# Patient Record
Sex: FEMALE | Race: WHITE | NOT HISPANIC OR LATINO | Employment: OTHER | URBAN - METROPOLITAN AREA
[De-identification: names, ages, dates, MRNs, and addresses within clinical notes are randomized per-mention and may not be internally consistent; named-entity substitution may affect disease eponyms.]

---

## 2017-02-06 ENCOUNTER — ALLSCRIPTS OFFICE VISIT (OUTPATIENT)
Dept: OTHER | Facility: OTHER | Age: 42
End: 2017-02-06

## 2017-03-10 ENCOUNTER — ALLSCRIPTS OFFICE VISIT (OUTPATIENT)
Dept: OTHER | Facility: OTHER | Age: 42
End: 2017-03-10

## 2017-10-23 ENCOUNTER — GENERIC CONVERSION - ENCOUNTER (OUTPATIENT)
Dept: OTHER | Facility: OTHER | Age: 42
End: 2017-10-23
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
## 2017-10-24 ENCOUNTER — LAB CONVERSION - ENCOUNTER (OUTPATIENT)
Dept: OTHER | Facility: OTHER | Age: 42
End: 2017-10-24

## 2017-10-24 LAB — LYME IGG/IGM AB (HISTORICAL): <0.9 INDEX

## 2017-10-26 ENCOUNTER — GENERIC CONVERSION - ENCOUNTER (OUTPATIENT)
Dept: OTHER | Facility: OTHER | Age: 42
End: 2017-10-26

## 2018-01-12 NOTE — PROGRESS NOTES
Assessment    1  Sinusitis (473 9) (J32 9)    Plan  Sinusitis    · Cefuroxime Axetil 500 MG Oral Tablet; 1 bid with food    Discussion/Summary    Self Referrals: No      Chief Complaint    1  Cough   2  Ear Pain   3  Fatigue   4  Nasal Symptoms   5  Sore Throat  Pt  in office today c/o cough, ear pain, nasal congestion, fatigue, and sore throat  History of Present Illness  HPI: Patient comes in with sinus congestion, ear pressure, sore throat and cough  Review of Systems    Constitutional: fever, feeling poorly and feeling tired  Cardiovascular: no complaints of slow or fast heart rate, no chest pain, no palpitations, no leg claudication or lower extremity edema  Respiratory: cough  Gastrointestinal: no complaints of abdominal pain, no constipation, no nausea or diarrhea, no vomiting, no bloody stools  Active Problems    1  Abdominal pain, generalized (789 07) (R10 84)   2  Abnormal mammogram of both breasts (793 80) (R92 8)   3  Cyst of right ovary (620 2) (N83 201)   4  Depression, major, recurrent, mild (296 31) (F33 0)   5  Encounter for cervical Pap smear with pelvic exam (V76 2,V72 31) (Z01 419)   6  Encounter for screening mammogram for malignant neoplasm of breast (V76 12)   (Z12 31)   7  Female pelvic pain (625 9) (R10 2)   8  Left acute suppurative otitis media (382 00) (H66 002)   9  Post-operative state (V45 89) (Z98 890)   10  Right lower quadrant abdominal pain (789 03) (R10 31)   11  Screening for hyperlipidemia (V77 91) (Z13 220)   12  Screening for iron deficiency anemia (V78 0) (Z13 0)   13  Strain of right gastrocnemius muscle (844 8) (T49 756R)    Past Medical History  Active Problems And Past Medical History Reviewed: The active problems and past medical history were reviewed and updated today  Family History  Father    1  Family history of alcohol abuse (V61 41) (Z81 1)   2  Family history of substance abuse (V17 0) (Z81 4)  Family History    3   Denied: Family history of mental disorder    Social History    · Being A Social Drinker   · Caffeine Use   · Never A Smoker   · Two children    Surgical History    1  History of Corneal LASIK   2  History of Hysterectomy   3  History of Knee Surgery   4  History of Tonsillectomy    Current Meds   1  Amitriptyline HCl - 25 MG Oral Tablet; TAKE 1 TABLET AT BEDTIME; Therapy: 08Apr2016 to (Evaluate:[id number])  Requested for: 08Apr2016; Last   Rx:08Apr2016 Ordered    The medication list was reviewed and updated today  Allergies    1  Codeine Derivatives   2  Penicillins   3  Tetracyclines    Vitals   Recorded: [id number] 11:09AM   Temperature 98 1 F   Heart Rate 93   Systolic 028   Diastolic 70   Height 5 ft 5 in   Weight 134 lb    BMI Calculated 22 3   BSA Calculated 1 67   O2 Saturation 97     Physical Exam    Constitutional   General appearance: No acute distress, well appearing and well nourished  Eyes   Conjunctiva and lids: No swelling, erythema or discharge  Pupils and irises: Equal, round and reactive to light  Ears, Nose, Mouth, and Throat   External inspection of ears and nose: Normal     Otoscopic examination: Tympanic membranes translucent with normal light reflex  Canals patent without erythema  Nasal mucosa, septum, and turbinates: Abnormal   The bilateral nasal mucosa was red  Oropharynx: Abnormal   The posterior pharynx was erythematous  Pulmonary   Respiratory effort: No increased work of breathing or signs of respiratory distress  Auscultation of lungs: Clear to auscultation  Cardiovascular   Auscultation of heart: Normal rate and rhythm, normal S1 and S2, without murmurs  Abdomen   Abdomen: Non-tender, no masses  Lymphatic   Palpation of lymph nodes in neck: No lymphadenopathy  Musculoskeletal   Gait and station: Normal     Digits and nails: Normal without clubbing or cyanosis      Inspection/palpation of joints, bones, and muscles: Normal     Psychiatric   Orientation to person, place, and time: Normal     Mood and affect: Normal          Signatures   Electronically signed by : MASON Yu ; Mar 10 2017 12:22PM EST                       (Author)

## 2018-01-13 NOTE — MISCELLANEOUS
Message   Recorded as Task   Date: 10/25/2017 11:49 AM, Created By: Gabo Charlton   Task Name: Follow Up   Assigned To: Mara Kenny   Regarding Patient: Zenon Coats, Status: Active   Comment:    Gabo Charlton - 25 Oct 2017 11:49 AM     TASK CREATED  Caller: Self; General Medical Question; [phone] (Home); [phone] (Work)  PT STILL RUNNING A FEVER AND NEGATIVE LYME TEST IS THERE ANYTHING ELSE WE CAN DO FOR HER ?     10/26/2017 at 0811: t/c to patient, reports that she continues to have joint pain, a low grade fever, and fatigue, recent tick bite 1 month ago, lyme's negative  Will treat based on clinical presentation  Has allergy to PCNs and tetracyclines  Will have patient begin cefuroxime 500 mg BID  Advised to call office if no improvement of symptoms in the next 1-2 weeks  Verbalized understanding and in agreement with plan of care  Plan  Tick bite, initial encounter    · Cefuroxime Axetil 500 MG Oral Tablet; TAKE 1 TABLET EVERY 12 HOURS DAILY    TAKE WITH FOOD    Signatures   Electronically signed by : SUSI Suero; Oct 26 2017  8:19AM EST                       (Author)

## 2018-01-14 VITALS
HEIGHT: 65 IN | WEIGHT: 139 LBS | SYSTOLIC BLOOD PRESSURE: 118 MMHG | OXYGEN SATURATION: 96 % | BODY MASS INDEX: 23.16 KG/M2 | HEART RATE: 90 BPM | DIASTOLIC BLOOD PRESSURE: 80 MMHG

## 2018-01-14 VITALS
HEIGHT: 65 IN | SYSTOLIC BLOOD PRESSURE: 128 MMHG | DIASTOLIC BLOOD PRESSURE: 70 MMHG | WEIGHT: 134 LBS | OXYGEN SATURATION: 97 % | HEART RATE: 93 BPM | TEMPERATURE: 98.1 F | BODY MASS INDEX: 22.33 KG/M2

## 2018-01-22 VITALS
HEART RATE: 88 BPM | SYSTOLIC BLOOD PRESSURE: 108 MMHG | HEIGHT: 65 IN | BODY MASS INDEX: 23.99 KG/M2 | OXYGEN SATURATION: 98 % | DIASTOLIC BLOOD PRESSURE: 80 MMHG | WEIGHT: 144 LBS

## 2018-01-22 VITALS — TEMPERATURE: 99.6 F

## 2018-06-11 ENCOUNTER — TELEPHONE (OUTPATIENT)
Dept: FAMILY MEDICINE CLINIC | Facility: CLINIC | Age: 43
End: 2018-06-11

## 2018-06-11 ENCOUNTER — OFFICE VISIT (OUTPATIENT)
Dept: FAMILY MEDICINE CLINIC | Facility: CLINIC | Age: 43
End: 2018-06-11
Payer: OTHER GOVERNMENT

## 2018-06-11 VITALS
SYSTOLIC BLOOD PRESSURE: 104 MMHG | HEIGHT: 65 IN | TEMPERATURE: 99.1 F | WEIGHT: 147 LBS | DIASTOLIC BLOOD PRESSURE: 64 MMHG | OXYGEN SATURATION: 92 % | BODY MASS INDEX: 24.49 KG/M2 | HEART RATE: 90 BPM

## 2018-06-11 DIAGNOSIS — F32.A DEPRESSION, UNSPECIFIED DEPRESSION TYPE: Primary | ICD-10-CM

## 2018-06-11 DIAGNOSIS — M25.521 PAIN IN JOINT INVOLVING UPPER ARM, RIGHT: Primary | ICD-10-CM

## 2018-06-11 PROCEDURE — 99212 OFFICE O/P EST SF 10 MIN: CPT | Performed by: PHYSICIAN ASSISTANT

## 2018-06-11 RX ORDER — AMITRIPTYLINE HYDROCHLORIDE 25 MG/1
TABLET, FILM COATED ORAL
Qty: 30 TABLET | Refills: 3 | Status: SHIPPED | OUTPATIENT
Start: 2018-06-11 | End: 2019-04-18 | Stop reason: SDUPTHER

## 2018-06-11 RX ORDER — PREDNISONE 10 MG/1
TABLET ORAL
Qty: 30 TABLET | Refills: 0 | Status: SHIPPED | OUTPATIENT
Start: 2018-06-11 | End: 2018-09-07

## 2018-06-11 NOTE — PROGRESS NOTES
Assessment/Plan:       Diagnoses and all orders for this visit:    Pain in joint involving upper arm, right  -     predniSONE 10 mg tablet; 5 po x 2d, 4 po x 2d, 3 po x 2d, 2 po x 2d, 1 po x 2d            Subjective:      Patient ID: Debby  is a 38 y o  female  Arm Pain    The incident occurred more than 1 week ago  The incident occurred at the gym  The injury mechanism was repetitive motion  The pain is present in the right elbow  The quality of the pain is described as aching and shooting  The pain radiates to the right arm  The pain is at a severity of 5/10  The pain is moderate  The pain has been intermittent since the incident  Associated symptoms include muscle weakness  Pertinent negatives include no chest pain, numbness or tingling  The symptoms are aggravated by movement and lifting  She has tried NSAIDs and rest for the symptoms  The treatment provided no relief  The following portions of the patient's history were reviewed and updated as appropriate:   She has a past medical history of Depression; Food allergy; Ovarian cyst; Pelvic pain in female; PONV (postoperative nausea and vomiting); and Wears glasses  ,   does not have any pertinent problems on file  ,   has a past surgical history that includes Tonsillectomy; Knee arthroscopy (Left); Refractive surgery (Bilateral); Endometrial ablation; Hysterectomy; Colonoscopy; [place] tooth extraction; pr lap,rmv  adnexal structure (Right, 9/16/2016); and pr lap,diagnostic abdomen (N/A, 9/16/2016)  ,  family history is not on file  ,   reports that she has never smoked  She has never used smokeless tobacco  She reports that she drinks alcohol  She reports that she does not use drugs  ,  is allergic to penicillins; codeine; shellfish-derived products; and tetracyclines & related     Current Outpatient Prescriptions   Medication Sig Dispense Refill    Acetaminophen (TYLENOL EXTRA STRENGTH PO) Take 1-2 tablets by mouth every 4 (four) hours as needed   amitriptyline (ELAVIL) 25 mg tablet Take 25 mg by mouth daily at bedtime as needed  1/2 tab       ibuprofen (MOTRIN) 600 mg tablet Take 1 tablet by mouth every 6 (six) hours as needed for mild pain for up to 30 days  30 tablet 0    predniSONE 10 mg tablet 5 po x 2d, 4 po x 2d, 3 po x 2d, 2 po x 2d, 1 po x 2d 30 tablet 0     No current facility-administered medications for this visit  Review of Systems   Constitutional: Negative for fatigue and fever  Respiratory: Negative for cough, chest tightness and shortness of breath  Cardiovascular: Negative for chest pain  Gastrointestinal: Negative for abdominal pain  Genitourinary: Negative for difficulty urinating  Musculoskeletal: Positive for arthralgias and myalgias  Negative for back pain and joint swelling  Skin: Negative  Allergic/Immunologic: Positive for food allergies  Neurological: Negative for tingling and numbness  Psychiatric/Behavioral: Negative  Objective:  Vitals:    06/11/18 0849   BP: 104/64   Pulse: 90   Temp: 99 1 °F (37 3 °C)   SpO2: 92%   Weight: 66 7 kg (147 lb)   Height: 5' 5" (1 651 m)     Body mass index is 24 46 kg/m²  Physical Exam   Constitutional: She is oriented to person, place, and time  She appears well-developed and well-nourished  Cardiovascular: Intact distal pulses  Musculoskeletal:        Right shoulder: She exhibits decreased range of motion  Right elbow: She exhibits decreased range of motion  She exhibits no swelling, no effusion, no deformity and no laceration  Tenderness found  Medial epicondyle tenderness noted  Neurological: She is alert and oriented to person, place, and time  Psychiatric: She has a normal mood and affect   Her behavior is normal

## 2018-06-11 NOTE — TELEPHONE ENCOUNTER
amitriptyline  25 mg   [facility]   [phone]  (patient was here today   verify this wasn't done already)

## 2018-06-22 ENCOUNTER — OFFICE VISIT (OUTPATIENT)
Dept: URGENT CARE | Facility: CLINIC | Age: 43
End: 2018-06-22
Payer: OTHER GOVERNMENT

## 2018-06-22 VITALS
DIASTOLIC BLOOD PRESSURE: 78 MMHG | OXYGEN SATURATION: 97 % | HEIGHT: 65 IN | BODY MASS INDEX: 24.16 KG/M2 | RESPIRATION RATE: 18 BRPM | SYSTOLIC BLOOD PRESSURE: 118 MMHG | TEMPERATURE: 98.4 F | HEART RATE: 90 BPM | WEIGHT: 145 LBS

## 2018-06-22 DIAGNOSIS — J01.90 ACUTE SINUSITIS, RECURRENCE NOT SPECIFIED, UNSPECIFIED LOCATION: Primary | ICD-10-CM

## 2018-06-22 PROCEDURE — 99203 OFFICE O/P NEW LOW 30 MIN: CPT | Performed by: PHYSICIAN ASSISTANT

## 2018-06-22 RX ORDER — AZITHROMYCIN 250 MG/1
TABLET, FILM COATED ORAL
Qty: 6 TABLET | Refills: 0 | Status: SHIPPED | OUTPATIENT
Start: 2018-06-22 | End: 2018-06-26

## 2018-06-22 RX ORDER — FLUTICASONE PROPIONATE 50 MCG
1 SPRAY, SUSPENSION (ML) NASAL DAILY
Qty: 16 G | Refills: 0 | Status: SHIPPED | OUTPATIENT
Start: 2018-06-22 | End: 2019-01-23 | Stop reason: ALTCHOICE

## 2018-06-22 RX ORDER — BROMPHENIRAMINE MALEATE, PSEUDOEPHEDRINE HYDROCHLORIDE, AND DEXTROMETHORPHAN HYDROBROMIDE 2; 30; 10 MG/5ML; MG/5ML; MG/5ML
5 SYRUP ORAL 4 TIMES DAILY PRN
Qty: 120 ML | Refills: 0 | Status: SHIPPED | OUTPATIENT
Start: 2018-06-22 | End: 2018-06-29

## 2018-06-22 NOTE — PATIENT INSTRUCTIONS
Symptoms are consistent with possible early sinusitis  -take Bromfed as directed  -use Flonase nasal spray as directed  -if symptoms do not improve over the next 3 days, please start taking the antibiotic as directed with food  -Increase fluids  -Tylenol/motrin  -Salt H20 gargles/throat lozenges  -Try using humidifier at nighttime    -Follow-up with PCP within 5 days  -Go to ER with worsening symptoms, difficulty breathing, high fever, or any signs of distress    Sinusitis   AMBULATORY CARE:   Sinusitis  is inflammation or infection of your sinuses  It is most often caused by a virus  Acute sinusitis may last up to 12 weeks  Chronic sinusitis lasts longer than 12 weeks  Recurrent sinusitis means you have 4 or more times in 1 year  Common symptoms include the following:   · Fever    · Pain, pressure, redness, or swelling around the forehead, cheeks, or eyes    · Thick yellow or green discharge from your nose    · Tenderness when you touch your face over your sinuses    · Dry cough that happens mostly at night or when you lie down    · Headache and face pain that is worse when you lean forward    · Tooth pain, or pain when you chew  Seek care immediately if:   · Your eye and eyelid are red, swollen, and painful  · You cannot open your eye  · You have vision changes, such as double vision  · Your eyeball bulges out or you cannot move your eye  · You are more sleepy than normal, or you notice changes in your ability to think, move, or talk  · You have a stiff neck, a fever, or a bad headache  · You have swelling of your forehead or scalp  Contact your healthcare provider if:   · Your symptoms do not improve after 3 days  · Your symptoms do not go away after 10 days  · You have nausea and are vomiting  · Your nose is bleeding  · You have questions or concerns about your condition or care  Treatment for sinusitis:  Your symptoms may go away on their own   Your healthcare provider may recommend watchful waiting for up to 10 days before starting antibiotics  You may  need any of the following:  · Acetaminophen  decreases pain and fever  It is available without a doctor's order  Ask how much to take and how often to take it  Follow directions  Read the labels of all other medicines you are using to see if they also contain acetaminophen, or ask your doctor or pharmacist  Acetaminophen can cause liver damage if not taken correctly  Do not use more than 4 grams (4,000 milligrams) total of acetaminophen in one day  · NSAIDs , such as ibuprofen, help decrease swelling, pain, and fever  This medicine is available with or without a doctor's order  NSAIDs can cause stomach bleeding or kidney problems in certain people  If you take blood thinner medicine, always ask your healthcare provider if NSAIDs are safe for you  Always read the medicine label and follow directions  · Nasal steroid sprays  may help decrease inflammation in your nose and sinuses  · Decongestants  help reduce swelling and drain mucus in the nose and sinuses  They may help you breathe easier  · Antihistamines  help dry mucus in the nose and relieve sneezing  · Antibiotics  help treat or prevent a bacterial infection  · Take your medicine as directed  Contact your healthcare provider if you think your medicine is not helping or if you have side effects  Tell him or her if you are allergic to any medicine  Keep a list of the medicines, vitamins, and herbs you take  Include the amounts, and when and why you take them  Bring the list or the pill bottles to follow-up visits  Carry your medicine list with you in case of an emergency  Self-care:   · Rinse your sinuses  Use a sinus rinse device to rinse your nasal passages with a saline (salt water) solution or distilled water  Do not use tap water  This will help thin the mucus in your nose and rinse away pollen and dirt   It will also help reduce swelling so you can breathe normally  Ask your healthcare provider how often to do this  · Breathe in steam   Heat a bowl of water until you see steam  Lean over the bowl and make a tent over your head with a large towel  Breathe deeply for about 20 minutes  Be careful not to get too close to the steam or burn yourself  Do this 3 times a day  You can also breathe deeply when you take a hot shower  · Sleep with your head elevated  Place an extra pillow under your head before you go to sleep to help your sinuses drain  · Drink liquids as directed  Ask your healthcare provider how much liquid to drink each day and which liquids are best for you  Liquids will thin the mucus in your nose and help it drain  Avoid drinks that contain alcohol or caffeine  · Do not smoke, and avoid secondhand smoke  Nicotine and other chemicals in cigarettes and cigars can make your symptoms worse  Ask your healthcare provider for information if you currently smoke and need help to quit  E-cigarettes or smokeless tobacco still contain nicotine  Talk to your healthcare provider before you use these products  Prevent the spread of germs that cause sinusitis:  Wash your hands often with soap and water  Wash your hands after you use the bathroom, change a child's diaper, or sneeze  Wash your hands before you prepare or eat food  Follow up with your healthcare provider as directed: You may be referred to an ear, nose, and throat specialist  Write down your questions so you remember to ask them during your visits  © 2017 [employer] Information is for End User's use only and may not be sold, redistributed or otherwise used for commercial purposes  All illustrations and images included in CareNotes® are the copyrighted property of [employer]  or Devin Sharma  The above information is an  only  It is not intended as medical advice for individual conditions or treatments   Talk to your doctor, nurse or pharmacist before following any medical regimen to see if it is safe and effective for you

## 2018-06-22 NOTE — PROGRESS NOTES
[facility]        NAME: Tamiko Hernandez is a 38 y o  female  : 1975    MRN: [id number]  DATE: 2018  TIME: 10:51 AM    Assessment and Plan   Acute sinusitis, recurrence not specified, unspecified location [J01 90]  1  Acute sinusitis, recurrence not specified, unspecified location  brompheniramine-pseudoephedrine-DM 30-2-10 MG/5ML syrup    fluticasone (FLONASE) 50 mcg/act nasal spray    azithromycin (ZITHROMAX) 250 mg tablet         Patient Instructions     Symptoms are consistent with possible early sinusitis  -take Bromfed as directed  -use Flonase nasal spray as directed  -if symptoms do not improve over the next 3 days, please start taking the antibiotic as directed with food  -Increase fluids  -Tylenol/motrin  -Salt H20 gargles/throat lozenges  -Try using humidifier at nighttime    -Follow-up with PCP within 5 days  -Go to ER with worsening symptoms, difficulty breathing, high fever, or any signs of distress    Chief Complaint     Chief Complaint   Patient presents with    Facial Pain     x4 days - fever  producing yellow mucous  No relief w/ OTC meds  Taking sudafed    Sore Throat    Cough         History of Present Illness       Patient is a 58-year-old female who presents today for evaluation of congestion/sinus pain and pressure that started on Monday  Patient states that she is having yellow drainage  She also admits to a slight sore throat with postnasal drip and cough  Patient has tried taking over-the-counter Sudafed without any relief  Patient admits to being around multiple sick contacts in her office  Review of Systems   Review of Systems   Constitutional: Negative for chills and fever  HENT: Positive for congestion, postnasal drip, rhinorrhea, sinus pain, sinus pressure and sore throat  Eyes: Negative for visual disturbance  Respiratory: Negative for shortness of breath  Cardiovascular: Negative for chest pain     Neurological: Negative for headaches  Current Medications       Current Outpatient Prescriptions:     amitriptyline (ELAVIL) 25 mg tablet, 1 tablet q hs, Disp: 30 tablet, Rfl: 3    Acetaminophen (TYLENOL EXTRA STRENGTH PO), Take 1-2 tablets by mouth every 4 (four) hours as needed  , Disp: , Rfl:     azithromycin (ZITHROMAX) 250 mg tablet, Take 2 tablets today then 1 tablet daily x 4 days, Disp: 6 tablet, Rfl: 0    brompheniramine-pseudoephedrine-DM 30-2-10 MG/5ML syrup, Take 5 mL by mouth 4 (four) times a day as needed for congestion or cough for up to 7 days, Disp: 120 mL, Rfl: 0    fluticasone (FLONASE) 50 mcg/act nasal spray, 1 spray into each nostril daily, Disp: 16 g, Rfl: 0    predniSONE 10 mg tablet, 5 po x 2d, 4 po x 2d, 3 po x 2d, 2 po x 2d, 1 po x 2d, Disp: 30 tablet, Rfl: 0    Current Allergies     Allergies as of 06/22/2018 - Reviewed 06/22/2018   Allergen Reaction Noted    Penicillins Hives 04/28/2014    Codeine GI Intolerance 07/24/2016    Shellfish-derived products Other (See Comments) 09/16/2016    Tetracyclines & related GI Intolerance 07/24/2016            The following portions of the patient's history were reviewed and updated as appropriate: allergies, current medications, past family history, past medical history, past social history, past surgical history and problem list      Past Medical History:   Diagnosis Date    Depression     Food allergy     scallops    Ovarian cyst     right    Pelvic pain in female     PONV (postoperative nausea and vomiting)     Wears glasses        Past Surgical History:   Procedure Laterality Date    COLONOSCOPY      ENDOMETRIAL ABLATION      HYSTERECTOMY      Partial    KNEE ARTHROSCOPY Left     ID LAP,DIAGNOSTIC ABDOMEN N/A 9/16/2016    Procedure: LAPAROSCOPY DIAGNOSTIC;  Surgeon: Marylin Michele DO;  Location: [facility];  Service: Gynecology    ID LAP,RMV  ADNEXAL STRUCTURE Right 9/16/2016    Procedure:  OOPHRECTOMY;  Surgeon: Marylin Michele DO; Location: [facility];  Service: Gynecology    REFRACTIVE SURGERY Bilateral     TONSILLECTOMY      WISDOM TOOTH EXTRACTION         No family history on file  Medications have been verified  Objective   /78   Pulse 90   Temp 98 4 °F (36 9 °C) (Temporal)   Resp 18   Ht 5' 5" (1 651 m)   Wt 65 8 kg (145 lb)   LMP  (LMP Unknown)   SpO2 97%   BMI 24 13 kg/m²        Physical Exam     Physical Exam   Constitutional: She is oriented to person, place, and time  She appears well-developed and well-nourished  No distress  HENT:   Right Ear: Tympanic membrane and external ear normal    Left Ear: Tympanic membrane and external ear normal    Nose: Mucosal edema and rhinorrhea present  Right sinus exhibits maxillary sinus tenderness and frontal sinus tenderness  Left sinus exhibits maxillary sinus tenderness and frontal sinus tenderness  Mouth/Throat: Uvula is midline, oropharynx is clear and moist and mucous membranes are normal        Eyes: Conjunctivae and EOM are normal  Pupils are equal, round, and reactive to light  Neck: Normal range of motion  Neck supple  Cardiovascular: Normal rate, regular rhythm and normal heart sounds  Pulmonary/Chest: Effort normal and breath sounds normal  She has no wheezes  She has no rales  Lymphadenopathy:     She has no cervical adenopathy  Neurological: She is alert and oriented to person, place, and time  Skin: Skin is warm and dry  Psychiatric: She has a normal mood and affect  Nursing note and vitals reviewed

## 2018-07-02 ENCOUNTER — OFFICE VISIT (OUTPATIENT)
Dept: FAMILY MEDICINE CLINIC | Facility: CLINIC | Age: 43
End: 2018-07-02
Payer: OTHER GOVERNMENT

## 2018-07-02 VITALS
WEIGHT: 146.6 LBS | DIASTOLIC BLOOD PRESSURE: 68 MMHG | HEART RATE: 89 BPM | TEMPERATURE: 98.3 F | OXYGEN SATURATION: 97 % | BODY MASS INDEX: 24.43 KG/M2 | SYSTOLIC BLOOD PRESSURE: 100 MMHG | HEIGHT: 65 IN

## 2018-07-02 DIAGNOSIS — M25.521 ELBOW PAIN, RIGHT: Primary | ICD-10-CM

## 2018-07-02 PROCEDURE — 99212 OFFICE O/P EST SF 10 MIN: CPT | Performed by: PHYSICIAN ASSISTANT

## 2018-07-02 NOTE — PROGRESS NOTES
Assessment/Plan:       Diagnoses and all orders for this visit:    Elbow pain, right  -     Ambulatory referral to Orthopedic Surgery; Future            Subjective:      Patient ID: Domenico Barnes is a 38 y o  female  Patient is here today for follow-up of her right elbow pain  She states that she did get some relief of pain from the prednisone taper and rest   She still notes that she has pain with supination  She has not gone back to lifting weights yet  She was seen in the urgent care for sinus infection  She states that she started having improvement over the weekend  The following portions of the patient's history were reviewed and updated as appropriate:   She has a past medical history of Depression; Food allergy; Ovarian cyst; Pelvic pain in female; PONV (postoperative nausea and vomiting); and Wears glasses  ,   does not have any pertinent problems on file  ,   has a past surgical history that includes Tonsillectomy; Knee arthroscopy (Left); Refractive surgery (Bilateral); Endometrial ablation; Hysterectomy; Colonoscopy; [place] tooth extraction; pr lap,rmv  adnexal structure (Right, 9/16/2016); and pr lap,diagnostic abdomen (N/A, 9/16/2016)  ,  family history includes Alcohol abuse in her father; Substance Abuse in her father  ,   reports that she has never smoked  She has never used smokeless tobacco  She reports that she drinks alcohol  She reports that she does not use drugs  ,  is allergic to penicillins; codeine; shellfish-derived products; and tetracyclines & related     Current Outpatient Prescriptions   Medication Sig Dispense Refill    Acetaminophen (TYLENOL EXTRA STRENGTH PO) Take 1-2 tablets by mouth every 4 (four) hours as needed        amitriptyline (ELAVIL) 25 mg tablet 1 tablet q hs 30 tablet 3    fluticasone (FLONASE) 50 mcg/act nasal spray 1 spray into each nostril daily 16 g 0    predniSONE 10 mg tablet 5 po x 2d, 4 po x 2d, 3 po x 2d, 2 po x 2d, 1 po x 2d 30 tablet 0     No current facility-administered medications for this visit  Review of Systems   Constitutional: Negative for chills and fever  HENT: Positive for congestion  Negative for sinus pain and sinus pressure  Musculoskeletal: Positive for joint swelling  Negative for back pain  Neurological: Negative  Objective:  Vitals:    07/02/18 1118   BP: 100/68   Pulse: 89   Temp: 98 3 °F (36 8 °C)   SpO2: 97%   Weight: 66 5 kg (146 lb 9 6 oz)   Height: 5' 5" (1 651 m)     Body mass index is 24 4 kg/m²  Physical Exam   Constitutional: She is oriented to person, place, and time  She appears well-developed and well-nourished  Musculoskeletal: Normal range of motion  She exhibits tenderness  Neurological: She is alert and oriented to person, place, and time  Skin: Skin is warm and dry  No erythema  Psychiatric: She has a normal mood and affect   Her behavior is normal

## 2018-09-07 ENCOUNTER — OFFICE VISIT (OUTPATIENT)
Dept: OBGYN CLINIC | Facility: MEDICAL CENTER | Age: 43
End: 2018-09-07
Payer: OTHER GOVERNMENT

## 2018-09-07 ENCOUNTER — APPOINTMENT (OUTPATIENT)
Dept: RADIOLOGY | Facility: MEDICAL CENTER | Age: 43
End: 2018-09-07
Payer: OTHER GOVERNMENT

## 2018-09-07 VITALS
HEIGHT: 65 IN | DIASTOLIC BLOOD PRESSURE: 72 MMHG | SYSTOLIC BLOOD PRESSURE: 106 MMHG | HEART RATE: 85 BPM | BODY MASS INDEX: 22.16 KG/M2 | WEIGHT: 133 LBS

## 2018-09-07 DIAGNOSIS — M25.521 RIGHT ELBOW PAIN: Primary | ICD-10-CM

## 2018-09-07 DIAGNOSIS — M77.8 RIGHT ELBOW TENDONITIS: ICD-10-CM

## 2018-09-07 DIAGNOSIS — M77.11 LATERAL EPICONDYLITIS OF RIGHT ELBOW: ICD-10-CM

## 2018-09-07 DIAGNOSIS — M25.521 ELBOW PAIN, RIGHT: ICD-10-CM

## 2018-09-07 DIAGNOSIS — M25.521 RIGHT ELBOW PAIN: ICD-10-CM

## 2018-09-07 PROCEDURE — 73070 X-RAY EXAM OF ELBOW: CPT

## 2018-09-07 PROCEDURE — 99203 OFFICE O/P NEW LOW 30 MIN: CPT | Performed by: ORTHOPAEDIC SURGERY

## 2018-09-07 NOTE — PROGRESS NOTES
Orthopedics   Pito Jackson, 39 yo  female MRN: [id number]    Chief Complaint:   Right elbow pain    HPI:  38 y o  right-hand-dominant female presents to our office with complaints of right elbow pain  Pain started 3 months ago without any inciting or traumatic events  Pain is on the outside of her elbow and radiates 1/3 down her forearm  Positions of flexion and supination of the elbow make the pain worse as does wrist extension  No numbness or tingling noted  No pain at the wrist or the shoulder  No position makes it better  She did try a short course of p  o  prednisone per family medicine doctor which did help the pain  The pain, unfortunately, came back when she stopped the course  Review Of Systems:   Review of Systems   Constitutional: Positive for activity change  HENT: Negative  Eyes: Negative  Respiratory: Negative  Cardiovascular: Negative  Gastrointestinal: Negative  Endocrine: Negative  Genitourinary: Negative  Musculoskeletal: Positive for arthralgias and myalgias  Skin: Negative  Allergic/Immunologic: Negative  Neurological: Negative  Hematological: Negative  Psychiatric/Behavioral: Negative          Past Medical History:   Past Medical History:   Diagnosis Date    Fractures     Osteoarthritis        Past Surgical History:   Past Surgical History:   Procedure Laterality Date    KNEE SURGERY      WRIST SURGERY         Family History:  Family history reviewed and non-contributory  Family History   Problem Relation Age of Onset    Heart attack Mother     Colon cancer Father        Social History:  Social History     Social History    Marital status: /Civil Union     Spouse name: N/A    Number of children: N/A    Years of education: N/A     Social History Main Topics    Smoking status: Former Smoker    Smokeless tobacco: Never Used    Alcohol use Yes      Comment: Socially    Drug use: No    Sexual activity: Not on file     Other Topics Concern    Not on file     Social History Narrative    No narrative on file       Allergies: Allergies   Allergen Reactions    Silver Sulfadiazine      Swelling above wound site       Labs:    0  Lab Value Date/Time   HCT 36 5 02/07/2015 1154   HGB 12 5 02/07/2015 1154   WBC 28,255 (H) 02/07/2015 1510   WBC 10 98 (H) 02/07/2015 1154   ESR 21 (H) 02/07/2015 1154   CRP 40 8 (H) 02/07/2015 1154       Meds:    Current Outpatient Prescriptions:     ValACYclovir HCl (VALTREX PO), Take by mouth, Disp: , Rfl:     Physical Exam:   /71   Pulse 73   Ht 5' 4" (1 626 m)   Wt 64 9 kg (143 lb)   BMI 24 55 kg/m²   Gen: Alert and oriented to person, place, time  HEENT: EOMI, eyes clear, moist mucus membranes, hearing intact  Respiratory: Bilateral chest rise  No audible wheezing found  Cardiovascular: No audible murmurs  Abdomen: Soft  Musculoskeletal: right upper extremity  · Skin intact  · TTP over lateral epicondyle  · Full active & passive ROM at elbow 0-135 degree   · Pain in the elbow with supination and elbow flexion past 90 degrees  · Pain with wrist extension  · No pain with active or passive movement of right shoulder or right wrist other than in wrist extension  · SILT m/r/u    · Motor intact ain/pin/m/r/u   · 2+ rad pulse    Radiology:   I personally reviewed the films  X-rays of right elbow show no acute fractures, dislocations, or degenerative changes    Assessment:  77 y  o female with right elbow lateral epicondylitis     Plan:   Weightbearing as tolerated right upper extremity  A corticosteroid injection was placed at the bedside to help with pain control  We will order physical therapy for the patient to begin strengthening and range-of-motion exercises  Will follow up with the patient in 8 weeks to evaluate treatment progression in symptoms  Patient understands and agrees with the treatment plan  Barbra Patel MD

## 2018-09-20 ENCOUNTER — EVALUATION (OUTPATIENT)
Dept: PHYSICAL THERAPY | Facility: CLINIC | Age: 43
End: 2018-09-20
Payer: OTHER GOVERNMENT

## 2018-09-20 DIAGNOSIS — M77.11 LATERAL EPICONDYLITIS OF RIGHT ELBOW: ICD-10-CM

## 2018-09-20 DIAGNOSIS — M25.521 RIGHT ELBOW PAIN: Primary | ICD-10-CM

## 2018-09-20 DIAGNOSIS — M77.8 RIGHT ELBOW TENDONITIS: ICD-10-CM

## 2018-09-20 DIAGNOSIS — M77.8 TENDINITIS OF RIGHT ELBOW: ICD-10-CM

## 2018-09-20 DIAGNOSIS — M25.521 ELBOW PAIN, RIGHT: ICD-10-CM

## 2018-09-20 PROCEDURE — 97140 MANUAL THERAPY 1/> REGIONS: CPT | Performed by: PHYSICAL THERAPIST

## 2018-09-20 PROCEDURE — 97162 PT EVAL MOD COMPLEX 30 MIN: CPT | Performed by: PHYSICAL THERAPIST

## 2018-09-20 PROCEDURE — G8985 CARRY GOAL STATUS: HCPCS | Performed by: PHYSICAL THERAPIST

## 2018-09-20 PROCEDURE — 97110 THERAPEUTIC EXERCISES: CPT | Performed by: PHYSICAL THERAPIST

## 2018-09-20 PROCEDURE — G8984 CARRY CURRENT STATUS: HCPCS | Performed by: PHYSICAL THERAPIST

## 2018-09-27 ENCOUNTER — OFFICE VISIT (OUTPATIENT)
Dept: PHYSICAL THERAPY | Facility: CLINIC | Age: 43
End: 2018-09-27
Payer: OTHER GOVERNMENT

## 2018-09-27 DIAGNOSIS — M77.11 LATERAL EPICONDYLITIS OF RIGHT ELBOW: Primary | ICD-10-CM

## 2018-09-27 DIAGNOSIS — M25.521 ELBOW PAIN, RIGHT: ICD-10-CM

## 2018-09-27 DIAGNOSIS — M25.521 PAIN IN JOINT INVOLVING UPPER ARM, RIGHT: ICD-10-CM

## 2018-09-27 PROCEDURE — 97140 MANUAL THERAPY 1/> REGIONS: CPT

## 2018-09-27 PROCEDURE — 97110 THERAPEUTIC EXERCISES: CPT

## 2018-09-27 NOTE — PROGRESS NOTES
Daily Note     Today's date: 2018  Patient name: Tete Velazco  : 1975  MRN: [id number]  Referring provider: Rocco Kimball MD  Dx:   Encounter Diagnosis     ICD-10-CM    1  Lateral epicondylitis of right elbow M77 11    2  Elbow pain, right M25 521    3  Pain in joint involving upper arm, right M25 521        Start Time: 0830  Stop Time: 09  Total time in clinic (min): 60 minutes    Subjective:  Reports 3-4/10  Has been trying to follow HEP      Objective: See treatment diary below      AssDaily Treatment Diary      Manual   (IE)                     Graston to right elbow extensors 8'  5'                   Radial nerve glides NV  KS                    Graston to right  Supinator/po brachior    6'                                                                         Exercise Diary   (IE)                     MH to start 10 mins NV  10'                                           Manual First                       - self wrist flexion str 4 x 30" NV  4 x 30"                   - self wrist extension str 4 x 30" NV  4 x30"                   - AROM wrist flex 2 x 10 ea NV  2 x10ea                   - AROM wrist ext 2 x 10 ea NV  2 x 10                   - AROM forearm pronation/supination 2 x 10 ea NV  2 x 10                                           Scapular retraction 10 x 10" NV  10 x 10"                   Cervical retraction 10 x 10" NV  10x 10"                    digiflex ea finger/     10x                                                                                                                                                                    Reviewed HEP  wrist stretches                                                   Modalities   (IE)                     MH to start 10 mins NV  10 min pre-session                   Cryo to finish 10 mins NV  10 min                                                   essment: Tolerated treatment well   Patient demonstrated fatigue post treatment and would benefit from continued PT      Plan: Progress treatment as tolerated

## 2018-10-01 ENCOUNTER — OFFICE VISIT (OUTPATIENT)
Dept: PHYSICAL THERAPY | Facility: CLINIC | Age: 43
End: 2018-10-01
Payer: OTHER GOVERNMENT

## 2018-10-01 DIAGNOSIS — M77.8 RIGHT ELBOW TENDONITIS: ICD-10-CM

## 2018-10-01 DIAGNOSIS — M77.11 LATERAL EPICONDYLITIS OF RIGHT ELBOW: Primary | ICD-10-CM

## 2018-10-01 DIAGNOSIS — M25.521 RIGHT ELBOW PAIN: ICD-10-CM

## 2018-10-01 DIAGNOSIS — M25.521 PAIN IN JOINT INVOLVING UPPER ARM, RIGHT: ICD-10-CM

## 2018-10-01 DIAGNOSIS — M77.8 TENDINITIS OF RIGHT ELBOW: ICD-10-CM

## 2018-10-01 DIAGNOSIS — M25.521 ELBOW PAIN, RIGHT: ICD-10-CM

## 2018-10-01 PROCEDURE — 97140 MANUAL THERAPY 1/> REGIONS: CPT

## 2018-10-01 PROCEDURE — 97110 THERAPEUTIC EXERCISES: CPT

## 2018-10-01 NOTE — PROGRESS NOTES
Daily Note     Today's date: 10/1/2018  Patient name: Twan Cho  : 1975  MRN: [id number]  Referring provider: Shawna Hernandez MD  Dx:   Encounter Diagnosis     ICD-10-CM    1  Lateral epicondylitis of right elbow M77 11    2  Elbow pain, right M25 521    3  Pain in joint involving upper arm, right M25 521    4  Right elbow pain M25 521    5  Tendinitis of right elbow M77 8    6  Right elbow tendonitis M77 8                   Subjective:  Pt reported that she has been feeling great          Objective: See treatment diary below      AssDaily Treatment Diary      Manual  - (IE)  9/27  10/1                 Graston to right elbow extensors 8'  5'  5'                 Radial nerve glides NV  KS                    Graston to right  Supinator/po brachior    6'  5'                                                                       Exercise Diary  - (IE)  9/27  10/1                  to start 10 mins NV  10'  Np                                         Manual First                       - self wrist flexion str 4 x 30" NV  4 x 30"  4 x30"                 - self wrist extension str 4 x 30" NV  4 x30"  4x 30"                 - AROM wrist flex 2 x 10 ea NV  2 x10ea  2x10 ea                  - AROM wrist ext 2 x 10 ea NV  2 x 10  2x10                  - AROM forearm pronation/supination 2 x 10 ea NV  2 x 10  2x10                                         Scapular retraction 10 x 10" NV  10 x 10"  10x 10"                 Cervical retraction 10 x 10" NV  10x 10"  10x 10"                  digiflex ea finger/     10x  10x red gripper                                                                                                                                                                  Reviewed HEP  wrist stretches                                                   Modalities  - (IE)  9/27  10/1                  to start 10 mins NV  10 min pre-session                   Cryo to finish 10 mins NV  10 min  10 min                                                 essment: Tolerated treatment well  Patient demonstrated fatigue post treatment and would benefit from continued PT  Pt expressed the exercises were easy  NV possibly add weights with wrist flexion and / exercise  Cryo for 10 minutes post session to decrease inflammation along with pain on R elbow  Plan: Progress treatment as tolerated

## 2018-10-04 ENCOUNTER — OFFICE VISIT (OUTPATIENT)
Dept: PHYSICAL THERAPY | Facility: CLINIC | Age: 43
End: 2018-10-04
Payer: OTHER GOVERNMENT

## 2018-10-04 DIAGNOSIS — M25.521 PAIN IN JOINT INVOLVING UPPER ARM, RIGHT: ICD-10-CM

## 2018-10-04 DIAGNOSIS — M25.521 RIGHT ELBOW PAIN: ICD-10-CM

## 2018-10-04 DIAGNOSIS — M77.8 RIGHT ELBOW TENDONITIS: ICD-10-CM

## 2018-10-04 DIAGNOSIS — M77.11 LATERAL EPICONDYLITIS OF RIGHT ELBOW: Primary | ICD-10-CM

## 2018-10-04 DIAGNOSIS — M25.521 ELBOW PAIN, RIGHT: ICD-10-CM

## 2018-10-04 DIAGNOSIS — M77.8 TENDINITIS OF RIGHT ELBOW: ICD-10-CM

## 2018-10-04 PROCEDURE — 97140 MANUAL THERAPY 1/> REGIONS: CPT | Performed by: PHYSICAL THERAPIST

## 2018-10-04 PROCEDURE — 97110 THERAPEUTIC EXERCISES: CPT | Performed by: PHYSICAL THERAPIST

## 2018-10-04 NOTE — PROGRESS NOTES
Daily Note     Today's date: 10/4/2018  Patient name: Ja Russ  : 1975  MRN: [id number]  Referring provider: Rosy Meyre MD  Dx:   Encounter Diagnosis     ICD-10-CM    1  Lateral epicondylitis of right elbow M77 11    2  Elbow pain, right M25 521    3  Pain in joint involving upper arm, right M25 521    4  Right elbow pain M25 521    5  Tendinitis of right elbow M77 8    6  Right elbow tendonitis M77 8                   Subjective: Patient reports she is no longer experiencing pain at rest        Objective: See treatment diary below      Assessment: Progressed exercises this date to include eccentric strengthening to enhance scar tissue remodeling  Pt was able to tolerate all exercises with minimal complaints of pain/discomfort  Pt required moderate verbal and tactile cues to attain full scapular retraction and depression with shoulder theraband strengthening  Plan: Progress treatment as tolerated        Daily Treatment Diary      Manual   (IE)  9/27  10/1  10-4               Graston to right elbow extensors 8'  5'  5'  JG with STM               Radial nerve glides NV  KS    NV                Graston to right  Supinator/po brachior    6'  5'                                                                       Exercise Diary   (IE)  9/27  10/1  10-4               MH to start 10 mins NV  10'  Np  10'                                       Manual First                       - self wrist flexion str 4 x 30" NV  4 x 30"  4 x30"  4 x 30"               - self wrist extension str 4 x 30" NV  4 x30"  4x 30"  4 x 30"               - AROM wrist flex 2 x 10 ea NV  2 x10ea  2x10 ea   DC               - AROM wrist ext 2 x 10 ea NV  2 x 10  2x10   DC               - AROM forearm pronation/supination 2 x 10 ea NV  2 x 10  2x10  DC                - eccentric wrist extension (6 seconds on the way down)        2 x 10 2#               - wrist flexion     2 x 10 2#         - forearm pronation/supination    2 x 10 2#                      Scapular retraction 10 x 10" NV  10 x 10"  10x 10"  DC               Cervical retraction 10 x 10" NV  10x 10"  10x 10"  DC                digiflex ea finger/     10x  10x red gripper  10x red gripper                Digiflex        10 x 10" Red                TB:                        - rows        2 x 10 RTB                - pulldowns        2 x 10 RTB                                                                Reviewed HEP  wrist stretches                                                   Modalities  9-20 (IE)  9/27  10/1  10-4               MH to start 10 mins NV  10 min pre-session    10 min pre-session               Cryo to finish 10 mins NV  10 min  10 min

## 2018-10-11 ENCOUNTER — OFFICE VISIT (OUTPATIENT)
Dept: PHYSICAL THERAPY | Facility: CLINIC | Age: 43
End: 2018-10-11
Payer: OTHER GOVERNMENT

## 2018-10-11 DIAGNOSIS — M25.521 RIGHT ELBOW PAIN: ICD-10-CM

## 2018-10-11 DIAGNOSIS — M77.11 LATERAL EPICONDYLITIS OF RIGHT ELBOW: Primary | ICD-10-CM

## 2018-10-11 DIAGNOSIS — M25.521 PAIN IN JOINT INVOLVING UPPER ARM, RIGHT: ICD-10-CM

## 2018-10-11 DIAGNOSIS — M77.8 RIGHT ELBOW TENDONITIS: ICD-10-CM

## 2018-10-11 DIAGNOSIS — M77.8 TENDINITIS OF RIGHT ELBOW: ICD-10-CM

## 2018-10-11 DIAGNOSIS — M25.521 ELBOW PAIN, RIGHT: ICD-10-CM

## 2018-10-11 PROCEDURE — 97110 THERAPEUTIC EXERCISES: CPT | Performed by: PHYSICAL THERAPIST

## 2018-10-11 NOTE — PROGRESS NOTES
Daily Note     Today's date: 10/11/2018  Patient name: Petty Saeed  : 1975  MRN: [id number]  Referring provider: Rajeev Drake MD  Dx:   Encounter Diagnosis     ICD-10-CM    1  Lateral epicondylitis of right elbow M77 11    2  Elbow pain, right M25 521    3  Pain in joint involving upper arm, right M25 521    4  Right elbow pain M25 521    5  Tendinitis of right elbow M77 8    6  Right elbow tendonitis M77 8                   Subjective: Patient reports minimal increase in muscle soreness following previous session  Objective: See treatment diary below      Assessment: Progressed exercises this session as charted  Pt was able to tolerate progressions with mild muscle soreness  Decreased muscle tension noted following manual therapy techniques and less feedback noted through IASTM tools  Plan: Progress treatment as tolerated          Daily Treatment Diary      Manual   (IE)  9/27  10/1  10-4  10-11             Graston to right elbow extensors 8'  5'  5'  JG with STM   JG with STM             Radial nerve glides NV  KS    NV                Graston to right  Supinator/po brachior    6'  5'                                                                       Exercise Diary   (IE)  9/27  10/1  10-4  10-11             MH to start 10 mins NV  10'  Np  10'  10'                                     Manual First                       - self wrist flexion str 4 x 30" NV  4 x 30"  4 x30"  4 x 30"   4 x 30"             - self wrist extension str 4 x 30" NV  4 x30"  4x 30"  4 x 30"   4 x 30"             - AROM wrist flex 2 x 10 ea NV  2 x10ea  2x10 ea   DC               - AROM wrist ext 2 x 10 ea NV  2 x 10  2x10   DC               - AROM forearm pronation/supination 2 x 10 ea NV  2 x 10  2x10  DC                - eccentric wrist extension (6 seconds on the way down)        2 x 10 2#  2 x 10 2#             - wrist flexion        2 x 10 2#  2 x 10 2#             - forearm pronation/supination       2 x 10 2#  2 x 10 2#                                     Scapular retraction 10 x 10" NV  10 x 10"  10x 10"  DC               Cervical retraction 10 x 10" NV  10x 10"  10x 10"  DC                digiflex ea finger/     10x  10x red gripper  10x red gripper  10x red gripper              Digiflex        10 x 10" Red  10 x 10" Red              TB:                        - rows        2 x 10 RTB  2 x 10 OTB              - pulldowns        2 x 10 RTB   2 x 10 OTB                                      Therabar eccentric wrist extension          5 x Red therabar              Reviewed HEP  wrist stretches                                                   Modalities  9-20 (IE)  9/27  10/1  10-4  10-11              to start 10 mins NV  10 min pre-session    10 min pre-session  10 min pre-session             Cryo to finish 10 mins NV  10 min  10 min

## 2018-10-15 ENCOUNTER — OFFICE VISIT (OUTPATIENT)
Dept: PHYSICAL THERAPY | Facility: CLINIC | Age: 43
End: 2018-10-15
Payer: OTHER GOVERNMENT

## 2018-10-15 DIAGNOSIS — M25.521 ELBOW PAIN, RIGHT: ICD-10-CM

## 2018-10-15 DIAGNOSIS — M77.8 TENDINITIS OF RIGHT ELBOW: ICD-10-CM

## 2018-10-15 DIAGNOSIS — M25.521 PAIN IN JOINT INVOLVING UPPER ARM, RIGHT: ICD-10-CM

## 2018-10-15 DIAGNOSIS — M77.11 LATERAL EPICONDYLITIS OF RIGHT ELBOW: Primary | ICD-10-CM

## 2018-10-15 PROCEDURE — 97140 MANUAL THERAPY 1/> REGIONS: CPT | Performed by: PHYSICAL THERAPIST

## 2018-10-15 PROCEDURE — 97110 THERAPEUTIC EXERCISES: CPT | Performed by: PHYSICAL THERAPIST

## 2018-10-15 NOTE — PROGRESS NOTES
Daily Note     Today's date: 10/15/2018  Patient name: Vu Banereje  : 1975  MRN: [id number]  Referring provider: North Briseno MD  Dx:   Encounter Diagnosis     ICD-10-CM    1  Lateral epicondylitis of right elbow M77 11    2  Elbow pain, right M25 521    3  Pain in joint involving upper arm, right M25 521    4  Tendinitis of right elbow M77 8                   Subjective: Patient states her pain has been getting better  Denies pain at rest   Symptoms only occur with movement or straightening elbow, and she rates those symptoms as a 1/10        Objective: See treatment diary below  Daily Treatment Diary      Manual   (IE)  9/27  10/1  10-4  10-11  10/15           Graston to right elbow extensors 8'  5'  5'  JG with STM   JG with STM  JF IASTM           Radial nerve glides NV  KS    NV                Graston to right  Supinator/po brachior    6'  5'                                                                       Exercise Diary   (IE)  9/27  10/1  10-4  10-11  10/15           MH to start 10 mins NV  10'  Np  10'  10'  10'                                   Manual First                       - self wrist flexion str 4 x 30" NV  4 x 30"  4 x30"  4 x 30"   4 x 30"  4x30"           - self wrist extension str 4 x 30" NV  4 x30"  4x 30"  4 x 30"   4 x 30"  4x30"           - AROM wrist flex 2 x 10 ea NV  2 x10ea  2x10 ea   DC               - AROM wrist ext 2 x 10 ea NV  2 x 10  2x10   DC               - AROM forearm pronation/supination 2 x 10 ea NV  2 x 10  2x10  DC                - eccentric wrist extension (6 seconds on the way down)        2 x 10 2#  2 x 10 2#  2x10 2#           - wrist flexion        2 x 10 2#  2 x 10 2#  2x10 2#           - forearm pronation/supination       2 x 10 2#  2 x 10 2#  2x10 2#                                   Scapular retraction 10 x 10" NV  10 x 10"  10x 10"  DC               Cervical retraction 10 x 10" NV  10x 10"  10x 10"  DC                digiflex ea finger/     10x  10x red gripper  10x red gripper  10x red gripper  10x red gripper            Digiflex        10 x 10" Red  10 x 10" Red  10"x10 red            TB:                        - rows        2 x 10 RTB  2 x 10 OTB  OTB 2x10            - pulldowns        2 x 10 RTB   2 x 10 OTB  OTB 2x10                                    Therabar eccentric wrist extension          5 x Red therabar  5x red             Reviewed HEP  wrist stretches                                                   Modalities  9-20 (IE)  9/27  10/1  10-4  10-11  10/15           MH to start 10 mins NV  10 min pre-session    10 min pre-session  10 min pre-session  10' pre-session           Cryo to finish 10 mins NV  10 min  10 min                                                 Assessment: Tolerated treatment well  Patient would benefit from continued PT  No pain after session today  Plan: Continue per plan of care

## 2018-10-18 ENCOUNTER — EVALUATION (OUTPATIENT)
Dept: PHYSICAL THERAPY | Facility: CLINIC | Age: 43
End: 2018-10-18
Payer: OTHER GOVERNMENT

## 2018-10-18 DIAGNOSIS — M25.521 ELBOW PAIN, RIGHT: ICD-10-CM

## 2018-10-18 DIAGNOSIS — M77.11 LATERAL EPICONDYLITIS OF RIGHT ELBOW: Primary | ICD-10-CM

## 2018-10-18 DIAGNOSIS — M77.8 TENDINITIS OF RIGHT ELBOW: ICD-10-CM

## 2018-10-18 DIAGNOSIS — M77.8 RIGHT ELBOW TENDONITIS: ICD-10-CM

## 2018-10-18 DIAGNOSIS — M25.521 PAIN IN JOINT INVOLVING UPPER ARM, RIGHT: ICD-10-CM

## 2018-10-18 DIAGNOSIS — M25.521 RIGHT ELBOW PAIN: ICD-10-CM

## 2018-10-18 PROCEDURE — G8985 CARRY GOAL STATUS: HCPCS | Performed by: PHYSICAL THERAPIST

## 2018-10-18 PROCEDURE — 97140 MANUAL THERAPY 1/> REGIONS: CPT | Performed by: PHYSICAL THERAPIST

## 2018-10-18 PROCEDURE — G8984 CARRY CURRENT STATUS: HCPCS | Performed by: PHYSICAL THERAPIST

## 2018-10-18 PROCEDURE — 97110 THERAPEUTIC EXERCISES: CPT | Performed by: PHYSICAL THERAPIST

## 2018-10-18 PROCEDURE — G8986 CARRY D/C STATUS: HCPCS | Performed by: PHYSICAL THERAPIST

## 2018-10-18 NOTE — PROGRESS NOTES
PT RE-EVALUATION    Today's date: 10/18/2018  Patient name: Esteban Short  : 1975  MRN: [id number]  Referring provider: Fabio Thayer MD  Dx:   Encounter Diagnosis     ICD-10-CM    1  Lateral epicondylitis of right elbow M77 11    2  Elbow pain, right M25 521    3  Pain in joint involving upper arm, right M25 521    4  Tendinitis of right elbow M77 8    5  Right elbow pain M25 521    6  Right elbow tendonitis M77 8                   Assessment    Assessment details: Esteban Short is a pleasant 38 y o  presenting to physical therapy with MD referral for Right elbow pain  (primary encounter diagnosis), Tendinitis of right elbow  Elbow pain, right, and  Right elbow tendonitis  Since time of initial evaluation, pt has ,made good improvements in wrist/elbow strength as well as wrist ROM  Although improvements have been made, this pt would continue to benefit from skilled PT services to maximize functional outcome  Problem list: Poor posture, decreased upper extremity strength,  increased muscle tension and increased neural tension  Treatment to include: Manual therapy techniques, wrist/elbow A/AA/PROM, eccentric wrist strengthening, RTC/periscapular muscle strengthening, instruction in a comprehensive HEP, and modalities as needed  This pt would benefit from skilled PT services to address their impairments and functional limitations to maximize functional outcome  Barriers to therapy: Depression, chronicity of symptoms  Understanding of Dx/Px/POC: good   Prognosis: good    Goals  ST  Pt will improve wrist extension to at least 80 degrees  in 2 weeks  PARTIALLY MET  2  Pt will improve wrist flexion to at least 80 degrees in 3 weeks  MET  3  Pt will improve elbow strength to 5/5 in 2 weeks  LT  Pt will be able to lift and carry coffee pot with minimal to no pain in 4 weeks  PARTIALLY MET  2  Pt will be independent in a comprehensive HEP in 4 weeks   PARTIALLY MET    Plan  Patient would benefit from: skilled physical therapy  Frequency: 2x week  Duration in weeks: 4  Treatment plan discussed with: patient  Plan details: 2 x per week for 2-4 weeks (pt will be out of the state for 1 week)        Subjective Evaluation    History of Present Illness  Mechanism of injury: Pt reports onset of right lateral elbow pain in March 2018  Pt reports she weight lifted that morning and later that day, the pain came on  Pt denies any change in the workout routine to cause increase in pain  Pt denies any other changes in every day activities  Pt was placed on prednisone for 2 weeks which significantly improved symptoms  Pt has recently seen ortho who provided pt a coritsone injection on 9-7-18 which has been beneficial  Pt had x-rays performed which did not reveal any bony abnormalities  No MRI performed at this point  Pt denies any numbness or tingling  Pt reports some aching in posterior aspect of shoulder on right side which as been present since elbow pain began  Premorbid status:  - ADLs: Independent with no difficulty  - Work: Full time, Full duty-  (mainly desk job)  - Recreation: beach body workout 6-7 days per week  Current status:  - ADLs/Functional activities:     - Washing lower back/tucking in shirt with Pain Levels: no pain   - Washing hair with Pain Levels: no pain   - Styling/braiding hair with mild pain (improved)   - Driving with Pain Levels: no pain   - Lifting the coffee pot with moderate pain and weakness (improved)   - Carrying phone in right hand with moderate pain (now avoids)   - Planking with moderate pain (has not attempted)   - Sleeping with 0 nightly sleep disturbances due to pain   - no pain turning a doorknob   - stirring a pot with mild pain (improved)  - Work: Full time, Full duty  - Recreation: walking for exercise    Since time of initial evaluation, pt reports she feels as though she is back to 75% of her premorbid status   Pt now experiences less pain styling her hair, stirring a pot, and lifting /carrying her coffee pot  Although improvements have been made, this pt would continue to benefit from skilled PT services to maximize functional outcome  Pain  Current pain ratin  At best pain ratin  At worst pain ratin  Location: lateral elbow into forearm  Quality: dull ache  Relieving factors: ice and medications  Aggravating factors: lifting  Progression: improved      Diagnostic Tests  X-ray: normal  Treatments  Previous treatment: injection treatment  Patient Goals  Patient goals for therapy: decreased pain  Patient goal: Return to [employer]        Objective     Tenderness     Additional Tenderness Details  TTP over right proximal extensor tendons at insertion on lateral epicondyle      Active Range of Motion     Left Elbow   Flexion: WFL  Extension: WFL  Forearm supination: 90 degrees   Forearm pronation: 80 degrees     Right Elbow   Flexion: WFL  Extension: WFL  Forearm supination: 90 degrees   Forearm pronation: 85 degrees     Left Wrist   Wrist flexion: 66 degrees   Wrist extension: 65 degrees     Right Wrist   Wrist flexion: 84 degrees   Wrist extension: 75 degrees     Passive Range of Motion     Left Wrist   Wrist flexion: 90 degrees   Wrist extension: 90 degrees     Right Wrist   Wrist flexion: 90 degrees with pain  Wrist extension: 90 degrees with pain    Strength/Myotome Testing     Left Shoulder     Planes of Motion   Flexion: 4   Abduction: 4   External rotation at 0°: 4   Internal rotation at 0°: 4     Right Shoulder     Planes of Motion   Flexion: 4-   Abduction: 4- (Pain over lateral elbow)   External rotation at 0°: 3+   Internal rotation at 0°: 3+     Left Elbow   Flexion: 4+  Extension: 4+  Forearm supination: 5  Forearm pronation: 5    Right Elbow   Flexion: 4  Extension: 4 (pain with extension)  Forearm supination: 5  Forearm pronation: 5    Left Wrist/Hand   Wrist extension: 4+  Wrist flexion: 4+     (2nd hand position)     Trial 1: 30    Trial 2: 20    Trial 3: 25    Comments: Measured in pounds  Pain present over lateral epicondyle    Right Wrist/Hand   Wrist extension: 5  Wrist flexion: 5     (2nd hand position)     Trial 1: 30    Trial 2: 30    Trial 3: 30    Comments: 30 #    Additional Strength Details  Pain with testing of ECRB, not longus on R    Tests     Left Elbow   Negative Cozen's and Mill's  Right Elbow   Positive Cozen's and Mill's  Additional Tests Details  Radial nerve tension testing:  R: Symptom onset within 30 degrees of full elbow extension  L: Symptoms onset within 5 degrees of full elbow extension      General Comments     Elbow Comments   Full shoulder ROM bilaterally and no reproduction of symptoms with cervical AROM             Precautions: depression  Daily Treatment Diary      Manual  9-20 (IE)  9/27  10/1  10-4  10-11  10/15  10-18 (RE)         Graston to right elbow extensors 8'  5'  5'  JG with STM   JG with STM  JF IASTM   JG with STM         Radial nerve glides NV  KS    NV                Graston to right  Supinator/po brachior    6'  5'                                                                       Exercise Diary  9-20 (IE)  9/27  10/1  10-4  10-11  10/15  10-18 (RE)         MH to start 10 mins NV  10'  Np  10'  10'  10' 10'                                 Manual First                       - self wrist flexion str 4 x 30" NV  4 x 30"  4 x30"  4 x 30"   4 x 30"  4x30"  HEP         - self wrist extension str 4 x 30" NV  4 x30"  4x 30"  4 x 30"   4 x 30"  4x30"  HEP         - AROM wrist flex 2 x 10 ea NV  2 x10ea  2x10 ea   DC               - AROM wrist ext 2 x 10 ea NV  2 x 10  2x10   DC               - AROM forearm pronation/supination 2 x 10 ea NV  2 x 10  2x10  DC                - eccentric wrist extension (6 seconds on the way down)        2 x 10 2#  2 x 10 2#  2x10 2#  3 x 10 3#         - wrist flexion        2 x 10 2#  2 x 10 2#  2x10 2#   3 x 10 3#         - forearm pronation/supination       2 x 10 2#  2 x 10 2#  2x10 2#   3 x 10 3#                                 Scapular retraction 10 x 10" NV  10 x 10"  10x 10"  DC               Cervical retraction 10 x 10" NV  10x 10"  10x 10"  DC                digiflex ea finger/     10x  10x red gripper  10x red gripper  10x red gripper  10x red gripper  10x red gripper          Digiflex        10 x 10" Red  10 x 10" Red  10"x10 red  10"x10 red          TB:                        - rows        2 x 10 RTB  2 x 10 OTB  OTB 2x10  OTB 2x10          - pulldowns        2 x 10 RTB   2 x 10 OTB  OTB 2x10  OTB 2x10                                  Therabar eccentric wrist extension          5 x Red therabar  5x red   2 x 10 Red Therabar          Reviewed HEP  wrist stretches                                                   Modalities  9-20 (IE)  9/27  10/1  10-4  10-11  10/15  10-18         MH to start 10 mins NV  10 min pre-session    10 min pre-session  10 min pre-session  10' pre-session   10' pre-session         Cryo to finish 10 mins NV  10 min  10 min

## 2018-11-09 ENCOUNTER — OFFICE VISIT (OUTPATIENT)
Dept: OBGYN CLINIC | Facility: MEDICAL CENTER | Age: 43
End: 2018-11-09
Payer: OTHER GOVERNMENT

## 2018-11-09 VITALS
HEIGHT: 63 IN | BODY MASS INDEX: 23.57 KG/M2 | SYSTOLIC BLOOD PRESSURE: 95 MMHG | DIASTOLIC BLOOD PRESSURE: 68 MMHG | HEART RATE: 80 BPM | WEIGHT: 133 LBS

## 2018-11-09 DIAGNOSIS — M77.11 LATERAL EPICONDYLITIS OF RIGHT ELBOW: ICD-10-CM

## 2018-11-09 DIAGNOSIS — M25.521 ELBOW PAIN, RIGHT: Primary | ICD-10-CM

## 2018-11-09 PROCEDURE — 20605 DRAIN/INJ JOINT/BURSA W/O US: CPT | Performed by: ORTHOPAEDIC SURGERY

## 2018-11-09 PROCEDURE — 99213 OFFICE O/P EST LOW 20 MIN: CPT | Performed by: ORTHOPAEDIC SURGERY

## 2018-11-09 RX ORDER — BUPIVACAINE HYDROCHLORIDE 7.5 MG/ML
1 INJECTION, SOLUTION EPIDURAL; RETROBULBAR
Status: COMPLETED | OUTPATIENT
Start: 2018-11-09 | End: 2018-11-09

## 2018-11-09 RX ORDER — BUPIVACAINE HYDROCHLORIDE 2.5 MG/ML
1 INJECTION, SOLUTION INFILTRATION; PERINEURAL
Status: COMPLETED | OUTPATIENT
Start: 2018-11-09 | End: 2018-11-09

## 2018-11-09 RX ORDER — BETAMETHASONE SODIUM PHOSPHATE AND BETAMETHASONE ACETATE 3; 3 MG/ML; MG/ML
6 INJECTION, SUSPENSION INTRA-ARTICULAR; INTRALESIONAL; INTRAMUSCULAR; SOFT TISSUE
Status: COMPLETED | OUTPATIENT
Start: 2018-11-09 | End: 2018-11-09

## 2018-11-09 RX ADMIN — BUPIVACAINE HYDROCHLORIDE 1 ML: 2.5 INJECTION, SOLUTION INFILTRATION; PERINEURAL at 08:27

## 2018-11-09 RX ADMIN — BUPIVACAINE HYDROCHLORIDE 1 ML: 7.5 INJECTION, SOLUTION EPIDURAL; RETROBULBAR at 08:27

## 2018-11-09 RX ADMIN — BETAMETHASONE SODIUM PHOSPHATE AND BETAMETHASONE ACETATE 6 MG: 3; 3 INJECTION, SUSPENSION INTRA-ARTICULAR; INTRALESIONAL; INTRAMUSCULAR; SOFT TISSUE at 08:27

## 2018-11-09 NOTE — PROGRESS NOTES
38 y o female presents for evaluation  Three months ago she received an injection the lateral condylar mass in the right elbow to manage epicondylitis  This in conjunction with a physical therapy program to restore strength has resulted in significant reduction of her symptoms  She does describe ongoing ache in the lateral aspect the right elbow  She describes no numbness and no tingling, she describes no motion loss  Review of Systems  Review of systems negative unless otherwise specified in HPI    Past Medical History  Past Medical History:   Diagnosis Date    Depression     Food allergy     scallops    Ovarian cyst     right    Pelvic pain in female     PONV (postoperative nausea and vomiting)     Wears glasses        Past Surgical History  Past Surgical History:   Procedure Laterality Date    COLONOSCOPY      ENDOMETRIAL ABLATION      HYSTERECTOMY      Partial    KNEE ARTHROSCOPY Left     AR LAP,DIAGNOSTIC ABDOMEN N/A 9/16/2016    Procedure: LAPAROSCOPY DIAGNOSTIC;  Surgeon: Ramsey Siddiqi DO;  Location: [facility];  Service: Gynecology    AR LAP,RMV  ADNEXAL STRUCTURE Right 9/16/2016    Procedure:  OOPHRECTOMY;  Surgeon: Ramsey Siddiqi DO;  Location: [facility];  Service: Gynecology    REFRACTIVE SURGERY Bilateral     TONSILLECTOMY      WISDOM TOOTH EXTRACTION         Current Medications  Current Outpatient Prescriptions on File Prior to Visit   Medication Sig Dispense Refill    Acetaminophen (TYLENOL EXTRA STRENGTH PO) Take 1-2 tablets by mouth every 4 (four) hours as needed   amitriptyline (ELAVIL) 25 mg tablet 1 tablet q hs 30 tablet 3    fluticasone (FLONASE) 50 mcg/act nasal spray 1 spray into each nostril daily 16 g 0     No current facility-administered medications on file prior to visit          Recent Labs [facility])    0  Lab Value Date/Time   HCT 43 4 09/15/2016 0833   HCT 44 8 01/24/2014 0802   HGB 14 5 09/15/2016 0833   HGB 14 9 01/24/2014 0802   WBC 8 19 09/15/2016 0833   WBC 7 36 01/24/2014 0802         Physical exam  · General: Awake, Alert, Oriented  · Eyes: Pupils equal, round and reactive to light  · Heart: regular rate and rhythm  · Lungs: No audible wheezing  · Abdomen: soft  Right shoulder moves well  Right elbow is not effused  She has full extension full flexion  There is no deficit pronation supination  She remains tender over the lateral condylar mass  Resisted wrist extension exacerbates his pain  The forearm compartments are soft and supple  The wrist is full motion  There is no evidence of DRUJ instability  There is negative Tinel sign over the radial tunnel  Imaging  None are performed today    Procedure  An injection of corticosteroid is provided for the right lateral condylar mass  It is documented below      Medium joint arthrocentesis  Date/Time: 11/9/2018 8:27 AM  Consent given by: patient  Supporting Documentation  Indications: pain   Procedure Details  Location: elbow - R elbow  Approach: anterolateral  Medications administered: 1 mL bupivacaine (PF) 0 75 %; 1 mL bupivacaine 0 25 %; 6 mg betamethasone acetate-betamethasone sodium phosphate 6 (3-3) mg/mL    Patient tolerance: patient tolerated the procedure well with no immediate complications  Dressing:  Sterile dressing applied          Assessment/Plan:   38 y  o female who has some residual discomfort in the lateral epicondylar mass is consistent with ongoing lateral epicondylitis  An injection of corticosteroid is indicated for pain relief purposes  It is advised, except, administers outlined above   I would welcome the opportunity see this patient back in the office in 3 months time for repeat physical exam

## 2018-12-07 NOTE — PROGRESS NOTES
Addendum: Resolved episode of care and added discharge G-codes  Pt has not returned after her vacation for > 30 days therefore she will now be discharged

## 2019-01-23 ENCOUNTER — OFFICE VISIT (OUTPATIENT)
Dept: FAMILY MEDICINE CLINIC | Facility: CLINIC | Age: 44
End: 2019-01-23
Payer: OTHER GOVERNMENT

## 2019-01-23 VITALS
TEMPERATURE: 97.4 F | HEIGHT: 63 IN | WEIGHT: 144 LBS | HEART RATE: 86 BPM | BODY MASS INDEX: 25.52 KG/M2 | OXYGEN SATURATION: 97 % | DIASTOLIC BLOOD PRESSURE: 72 MMHG | SYSTOLIC BLOOD PRESSURE: 110 MMHG

## 2019-01-23 DIAGNOSIS — Z13.220 SCREENING FOR HYPERLIPIDEMIA: ICD-10-CM

## 2019-01-23 DIAGNOSIS — R53.83 FATIGUE, UNSPECIFIED TYPE: Primary | ICD-10-CM

## 2019-01-23 PROBLEM — E78.49 OTHER HYPERLIPIDEMIA: Status: RESOLVED | Noted: 2019-01-23 | Resolved: 2019-01-23

## 2019-01-23 PROBLEM — M25.521 ELBOW PAIN, RIGHT: Status: RESOLVED | Noted: 2018-07-02 | Resolved: 2019-01-23

## 2019-01-23 PROBLEM — M25.521 PAIN IN JOINT INVOLVING UPPER ARM, RIGHT: Status: RESOLVED | Noted: 2018-06-11 | Resolved: 2019-01-23

## 2019-01-23 PROBLEM — E78.49 OTHER HYPERLIPIDEMIA: Status: ACTIVE | Noted: 2019-01-23

## 2019-01-23 PROCEDURE — 99214 OFFICE O/P EST MOD 30 MIN: CPT | Performed by: NURSE PRACTITIONER

## 2019-01-23 NOTE — PATIENT INSTRUCTIONS
Obtain fasting labs  Do not eat anything after midnight   May drink water   Maintain adequate hydration  Rest  Exercise  Maintain well balanced diet

## 2019-01-23 NOTE — PROGRESS NOTES
Assessment/Plan:    Fatigue  Obtain labs  Hydrate  Rest  Eat a well balanced diet    Screening for hyperlipidemia  Lipid panel         Problem List Items Addressed This Visit     Fatigue - Primary     Obtain labs  Hydrate  Rest  Eat a well balanced diet         Relevant Orders    CBC and differential    TSH, 3rd generation with Free T4 reflex    Vitamin D Panel    Comprehensive metabolic panel    Screening for hyperlipidemia     Lipid panel         Relevant Orders    Lipid panel            Subjective:      Patient ID: Rosaline White is a 37 y o  female  Pt is here with reports of fatigue  Pt is a  does not report any increased stress  Drinks a lot of water, was exercising unable  To do so for past few weeks  Has cut out carbs  Denies any upper respiratory symptoms  Lives with  and kids   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  Has not had a lab work up in few years          The following portions of the patient's history were reviewed and updated as appropriate: allergies, current medications, past family history, past medical history, past social history, past surgical history and problem list     Review of Systems   Constitutional: Positive for fatigue  Negative for chills and fever  HENT: Positive for ear pain and sinus pressure  Negative for sore throat  Eyes: Negative  Respiratory: Negative  Cardiovascular: Negative  Gastrointestinal: Negative  Endocrine: Negative  Genitourinary: Negative  Musculoskeletal: Negative  Allergic/Immunologic: Negative  Neurological: Negative  Hematological: Negative  Psychiatric/Behavioral: Negative  Objective:      /72   Pulse 86   Temp (!) 97 4 °F (36 3 °C)   Ht 5' 3" (1 6 m)   Wt 65 3 kg (144 lb)   LMP  (LMP Unknown)   SpO2 97%   BMI 25 51 kg/m²          Physical Exam   Constitutional: She is oriented to person, place, and time  She appears well-developed and well-nourished  HENT:   Head: Normocephalic and atraumatic  Right Ear: External ear normal    Left Ear: External ear normal    Eyes: Pupils are equal, round, and reactive to light  Neck: Normal range of motion  Cardiovascular: Normal rate and regular rhythm  Pulmonary/Chest: Effort normal    Abdominal: Soft  Bowel sounds are normal    Musculoskeletal: Normal range of motion  Neurological: She is alert and oriented to person, place, and time  Skin: Skin is warm and dry  Psychiatric: She has a normal mood and affect

## 2019-01-24 ENCOUNTER — APPOINTMENT (OUTPATIENT)
Dept: LAB | Facility: HOSPITAL | Age: 44
End: 2019-01-24
Payer: OTHER GOVERNMENT

## 2019-01-24 DIAGNOSIS — Z13.220 SCREENING FOR HYPERLIPIDEMIA: ICD-10-CM

## 2019-01-24 DIAGNOSIS — R53.83 FATIGUE, UNSPECIFIED TYPE: ICD-10-CM

## 2019-01-24 LAB
ALBUMIN SERPL BCP-MCNC: 3.8 G/DL (ref 3.5–5)
ALP SERPL-CCNC: 63 U/L (ref 46–116)
ALT SERPL W P-5'-P-CCNC: 28 U/L (ref 12–78)
ANION GAP SERPL CALCULATED.3IONS-SCNC: 9 MMOL/L (ref 4–13)
AST SERPL W P-5'-P-CCNC: 27 U/L (ref 5–45)
BASOPHILS # BLD AUTO: 0.06 THOUSANDS/ΜL (ref 0–0.1)
BASOPHILS NFR BLD AUTO: 1 % (ref 0–1)
BILIRUB SERPL-MCNC: 1.2 MG/DL (ref 0.2–1)
BUN SERPL-MCNC: 8 MG/DL (ref 5–25)
CALCIUM SERPL-MCNC: 8.6 MG/DL (ref 8.3–10.1)
CHLORIDE SERPL-SCNC: 105 MMOL/L (ref 100–108)
CHOLEST SERPL-MCNC: 167 MG/DL (ref 50–200)
CO2 SERPL-SCNC: 26 MMOL/L (ref 21–32)
CREAT SERPL-MCNC: 0.69 MG/DL (ref 0.6–1.3)
EOSINOPHIL # BLD AUTO: 0.14 THOUSAND/ΜL (ref 0–0.61)
EOSINOPHIL NFR BLD AUTO: 2 % (ref 0–6)
ERYTHROCYTE [DISTWIDTH] IN BLOOD BY AUTOMATED COUNT: 13.2 % (ref 11.6–15.1)
FERRITIN SERPL-MCNC: 81 NG/ML (ref 8–388)
GFR SERPL CREATININE-BSD FRML MDRD: 105 ML/MIN/1.73SQ M
GLUCOSE P FAST SERPL-MCNC: 80 MG/DL (ref 65–99)
HCT VFR BLD AUTO: 43.9 % (ref 34.8–46.1)
HDLC SERPL-MCNC: 96 MG/DL (ref 40–60)
HGB BLD-MCNC: 14.2 G/DL (ref 11.5–15.4)
IMM GRANULOCYTES # BLD AUTO: 0.01 THOUSAND/UL (ref 0–0.2)
IMM GRANULOCYTES NFR BLD AUTO: 0 % (ref 0–2)
LDLC SERPL CALC-MCNC: 64 MG/DL (ref 0–100)
LYMPHOCYTES # BLD AUTO: 1.38 THOUSANDS/ΜL (ref 0.6–4.47)
LYMPHOCYTES NFR BLD AUTO: 21 % (ref 14–44)
MCH RBC QN AUTO: 29.3 PG (ref 26.8–34.3)
MCHC RBC AUTO-ENTMCNC: 32.3 G/DL (ref 31.4–37.4)
MCV RBC AUTO: 91 FL (ref 82–98)
MONOCYTES # BLD AUTO: 0.44 THOUSAND/ΜL (ref 0.17–1.22)
MONOCYTES NFR BLD AUTO: 7 % (ref 4–12)
NEUTROPHILS # BLD AUTO: 4.5 THOUSANDS/ΜL (ref 1.85–7.62)
NEUTS SEG NFR BLD AUTO: 69 % (ref 43–75)
NONHDLC SERPL-MCNC: 71 MG/DL
NRBC BLD AUTO-RTO: 0 /100 WBCS
PLATELET # BLD AUTO: 221 THOUSANDS/UL (ref 149–390)
PMV BLD AUTO: 10.3 FL (ref 8.9–12.7)
POTASSIUM SERPL-SCNC: 4.2 MMOL/L (ref 3.5–5.3)
PROT SERPL-MCNC: 7.2 G/DL (ref 6.4–8.2)
RBC # BLD AUTO: 4.84 MILLION/UL (ref 3.81–5.12)
SODIUM SERPL-SCNC: 140 MMOL/L (ref 136–145)
TRIGL SERPL-MCNC: 35 MG/DL
TSH SERPL DL<=0.05 MIU/L-ACNC: 2.52 UIU/ML (ref 0.36–3.74)
WBC # BLD AUTO: 6.53 THOUSAND/UL (ref 4.31–10.16)

## 2019-01-24 PROCEDURE — 36415 COLL VENOUS BLD VENIPUNCTURE: CPT

## 2019-01-24 PROCEDURE — 80053 COMPREHEN METABOLIC PANEL: CPT

## 2019-01-24 PROCEDURE — 82728 ASSAY OF FERRITIN: CPT

## 2019-01-24 PROCEDURE — 84443 ASSAY THYROID STIM HORMONE: CPT

## 2019-01-24 PROCEDURE — 82306 VITAMIN D 25 HYDROXY: CPT

## 2019-01-24 PROCEDURE — 80061 LIPID PANEL: CPT

## 2019-01-24 PROCEDURE — 85025 COMPLETE CBC W/AUTO DIFF WBC: CPT

## 2019-01-25 ENCOUNTER — TELEPHONE (OUTPATIENT)
Dept: FAMILY MEDICINE CLINIC | Facility: CLINIC | Age: 44
End: 2019-01-25

## 2019-01-25 NOTE — TELEPHONE ENCOUNTER
Patient was requesting lab results  She saw what was normal on My Chart, but doesn't know the abnormals

## 2019-01-27 LAB
25(OH)D2 SERPL-MCNC: 1.3 NG/ML
25(OH)D3 SERPL-MCNC: 16 NG/ML
25(OH)D3+25(OH)D2 SERPL-MCNC: 17 NG/ML

## 2019-01-29 ENCOUNTER — TELEPHONE (OUTPATIENT)
Dept: FAMILY MEDICINE CLINIC | Facility: CLINIC | Age: 44
End: 2019-01-29

## 2019-01-29 DIAGNOSIS — R79.89 LOW VITAMIN D LEVEL: Primary | ICD-10-CM

## 2019-01-29 RX ORDER — MELATONIN
1000 DAILY
Qty: 30 TABLET | Refills: 3
Start: 2019-01-29

## 2019-02-13 ENCOUNTER — OFFICE VISIT (OUTPATIENT)
Dept: FAMILY MEDICINE CLINIC | Facility: CLINIC | Age: 44
End: 2019-02-13
Payer: OTHER GOVERNMENT

## 2019-02-13 VITALS
HEIGHT: 63 IN | OXYGEN SATURATION: 100 % | SYSTOLIC BLOOD PRESSURE: 90 MMHG | HEART RATE: 97 BPM | DIASTOLIC BLOOD PRESSURE: 62 MMHG | BODY MASS INDEX: 25.27 KG/M2 | WEIGHT: 142.6 LBS

## 2019-02-13 DIAGNOSIS — R17 ELEVATED BILIRUBIN: Primary | ICD-10-CM

## 2019-02-13 DIAGNOSIS — E66.3 OVERWEIGHT (BMI 25.0-29.9): ICD-10-CM

## 2019-02-13 PROCEDURE — 99213 OFFICE O/P EST LOW 20 MIN: CPT | Performed by: NURSE PRACTITIONER

## 2019-02-13 RX ORDER — MULTIVITAMIN
1 CAPSULE ORAL DAILY
COMMUNITY

## 2019-02-13 NOTE — PATIENT INSTRUCTIONS
Continue using Vitamin D   Get ultrasound done  Continue all other medications as before  Rest exercise  Maintain healthy diet  Heart Healthy Diet   AMBULATORY CARE:   A heart healthy diet  is an eating plan low in total fat, unhealthy fats, and sodium (salt)  A heart healthy diet helps decrease your risk for heart disease and stroke  Limit the amount of fat you eat to 25% to 35% of your total daily calories  Limit sodium to less than 2,300 mg each day  Healthy fats:  Healthy fats can help improve cholesterol levels  The risk for heart disease is decreased when cholesterol levels are normal  Choose healthy fats, such as the following:  · Unsaturated fat  is found in foods such as soybean, canola, olive, corn, and safflower oils  It is also found in soft tub margarine that is made with liquid vegetable oil  · Omega-3 fat  is found in certain fish, such as salmon, tuna, and trout, and in walnuts and flaxseed  Unhealthy fats:  Unhealthy fats can cause unhealthy cholesterol levels in your blood and increase your risk of heart disease  Limit unhealthy fats, such as the following:  · Cholesterol  is found in animal foods, such as eggs and lobster, and in dairy products made from whole milk  Limit cholesterol to less than 300 milligrams (mg) each day  You may need to limit cholesterol to 200 mg each day if you have heart disease  · Saturated fat  is found in meats, such as dinero and hamburger  It is also found in chicken or turkey skin, whole milk, and butter  Limit saturated fat to less than 7% of your total daily calories  Limit saturated fat to less than 6% if you have heart disease or are at increased risk for it  · Trans fat  is found in packaged foods, such as potato chips and cookies  It is also in hard margarine, some fried foods, and shortening  Avoid trans fats as much as possible    Heart healthy foods and drinks to include:  Ask your dietitian or healthcare provider how many servings to have from each of the following food groups:  · Grains:      ¨ Whole-wheat breads, cereals, and pastas, and brown rice    ¨ Low-fat, low-sodium crackers and chips    · Vegetables:      ¨ Broccoli, green beans, green peas, and spinach    ¨ Collards, kale, and lima beans    ¨ Carrots, sweet potatoes, tomatoes, and peppers    ¨ Canned vegetables with no salt added    · Fruits:      ¨ Bananas, peaches, pears, and pineapple    ¨ Grapes, raisins, and dates    ¨ Oranges, tangerines, grapefruit, orange juice, and grapefruit juice    ¨ Apricots, mangoes, melons, and papaya    ¨ Raspberries and strawberries    ¨ Canned fruit with no added sugar    · Low-fat dairy products:      ¨ Nonfat (skim) milk, 1% milk, and low-fat almond, cashew, or soy milks fortified with calcium    ¨ Low-fat cheese, regular or frozen yogurt, and cottage cheese    · Meats and proteins , such as lean cuts of beef and pork (loin, leg, round), skinless chicken and turkey, legumes, soy products, egg whites, and nuts  Foods and drinks to limit or avoid:  Ask your dietitian or healthcare provider about these and other foods that are high in unhealthy fat, sodium, and sugar:  · Snack or packaged foods , such as frozen dinners, cookies, macaroni and cheese, and cereals with more than 300 mg of sodium per serving    · Canned or dry mixes  for cakes, soups, sauces, or gravies    · Vegetables with added sodium , such as instant potatoes, vegetables with added sauces, or regular canned vegetables    · Other foods high in sodium , such as ketchup, barbecue sauce, salad dressing, pickles, olives, soy sauce, and miso    · High-fat dairy foods  such as whole or 2% milk, cream cheese, or sour cream, and cheeses     · High-fat protein foods  such as high-fat cuts of beef (T-bone steaks, ribs), chicken or turkey with skin, and organ meats, such as liver    · Cured or smoked meats , such as hot dogs, dinero, and sausage    · Unhealthy fats and oils , such as butter, stick margarine, shortening, and cooking oils such as coconut or palm oil    · Food and drinks high in sugar , such as soft drinks (soda), sports drinks, sweetened tea, candy, cake, cookies, pies, and doughnuts  Other diet guidelines to follow:   · Eat more foods containing omega-3 fats  Eat fish high in omega-3 fats at least 2 times a week  · Limit alcohol  Too much alcohol can damage your heart and raise your blood pressure  Women should limit alcohol to 1 drink a day  Men should limit alcohol to 2 drinks a day  A drink of alcohol is 12 ounces of beer, 5 ounces of wine, or 1½ ounces of liquor  · Choose low-sodium foods  High-sodium foods can lead to high blood pressure  Add little or no salt to food you prepare  Use herbs and spices in place of salt  · Eat more fiber  to help lower cholesterol levels  Eat at least 5 servings of fruits and vegetables each day  Eat 3 ounces of whole-grain foods each day  Legumes (beans) are also a good source of fiber  Lifestyle guidelines:   · Do not smoke  Nicotine and other chemicals in cigarettes and cigars can cause lung and heart damage  Ask your healthcare provider for information if you currently smoke and need help to quit  E-cigarettes or smokeless tobacco still contain nicotine  Talk to your healthcare provider before you use these products  · Exercise regularly  to help you maintain a healthy weight and improve your blood pressure and cholesterol levels  Ask your healthcare provider about the best exercise plan for you  Do not start an exercise program without asking your healthcare provider  Follow up with your healthcare provider as directed:  Write down your questions so you remember to ask them during your visits  © 2017 [employer] Information is for End User's use only and may not be sold, redistributed or otherwise used for commercial purposes   All illustrations and images included in CareNotes® are the copyrighted property of [employer]  or [employer]in [employer]arma  The above information is an  only  It is not intended as medical advice for individual conditions or treatments  Talk to your doctor, nurse or pharmacist before following any medical regimen to see if it is safe and effective for you

## 2019-02-13 NOTE — PROGRESS NOTES
Assessment/Plan:    Elevated bilirubin  Bilirubin level has increased from the previous lab  Will obtain an us of abdomen to assess liver and pancreas    Overweight (BMI 25 0-29  9)  Healthy diet and exercise information given         Problem List Items Addressed This Visit        Other    Elevated bilirubin - Primary     Bilirubin level has increased from the previous lab  Will obtain an us of abdomen to assess liver and pancreas         Relevant Orders    US abdomen complete    Overweight (BMI 25 0-29  9)     Healthy diet and exercise information given                 Subjective:      Patient ID: Esteban Short is a 37 y o  female  Pt is here with to follow up with labs  Feels well today, is taking the vitamin D and reports that the fatigue is better      The following portions of the patient's history were reviewed and updated as appropriate: allergies, current medications, past family history, past medical history, past social history, past surgical history and problem list     Review of Systems   Constitutional: Negative  HENT: Negative  Eyes: Negative  Respiratory: Negative  Cardiovascular: Negative  Gastrointestinal: Negative  Endocrine: Negative  Genitourinary: Negative  Musculoskeletal: Negative  Allergic/Immunologic: Negative  Neurological: Negative  Hematological: Negative  Psychiatric/Behavioral: Negative  Objective:      BP 90/62   Pulse 97   Ht 5' 3" (1 6 m)   Wt 64 7 kg (142 lb 9 6 oz)   LMP  (LMP Unknown)   SpO2 100%   BMI 25 26 kg/m²          Physical Exam   Constitutional: She is oriented to person, place, and time  She appears well-developed and well-nourished  HENT:   Head: Normocephalic and atraumatic  Right Ear: External ear normal    Left Ear: External ear normal    Eyes: Pupils are equal, round, and reactive to light  Neck: Normal range of motion  Cardiovascular: Normal rate and regular rhythm     Pulmonary/Chest: Effort normal  Abdominal: Soft  Bowel sounds are normal    Musculoskeletal: Normal range of motion  Neurological: She is alert and oriented to person, place, and time  Skin: Skin is warm and dry  Psychiatric: She has a normal mood and affect  Nursing note and vitals reviewed  Labs:    Lab Results   Component Value Date    WBC 6 53 01/24/2019    HGB 14 2 01/24/2019    HCT 43 9 01/24/2019    MCV 91 01/24/2019     01/24/2019     Lab Results   Component Value Date    K 4 2 01/24/2019     01/24/2019    CO2 26 01/24/2019    BUN 8 01/24/2019    CREATININE 0 69 01/24/2019    GLUF 80 01/24/2019    CALCIUM 8 6 01/24/2019    AST 27 01/24/2019    ALT 28 01/24/2019    ALKPHOS 63 01/24/2019    EGFR 105 01/24/2019     Lab Results   Component Value Date    CALCIUM 8 6 01/24/2019    K 4 2 01/24/2019    CO2 26 01/24/2019     01/24/2019    BUN 8 01/24/2019    CREATININE 0 69 01/24/2019       BMI Counseling: Body mass index is 25 26 kg/m²  Discussed the patient's BMI with her  The BMI is above average  BMI counseling and education was provided to the patient  Nutrition recommendations include reducing portion sizes, decreasing overall calorie intake, 3-5 servings of fruits/vegetables daily, reducing fast food intake, consuming healthier snacks, decreasing soda and/or juice intake, moderation in carbohydrate intake, increasing intake of lean protein, reducing intake of saturated fat and trans fat and reducing intake of cholesterol

## 2019-02-13 NOTE — ASSESSMENT & PLAN NOTE
Bilirubin level has increased from the previous lab  Will obtain an us of abdomen to assess liver and pancreas

## 2019-02-20 ENCOUNTER — HOSPITAL ENCOUNTER (OUTPATIENT)
Dept: RADIOLOGY | Facility: MEDICAL CENTER | Age: 44
Discharge: HOME | End: 2019-02-20
Payer: OTHER GOVERNMENT

## 2019-02-20 DIAGNOSIS — R17 ELEVATED BILIRUBIN: ICD-10-CM

## 2019-02-20 PROCEDURE — 76700 US EXAM ABDOM COMPLETE: CPT

## 2019-02-27 ENCOUNTER — OFFICE VISIT (OUTPATIENT)
Dept: FAMILY MEDICINE CLINIC | Facility: CLINIC | Age: 44
End: 2019-02-27
Payer: OTHER GOVERNMENT

## 2019-02-27 ENCOUNTER — APPOINTMENT (OUTPATIENT)
Dept: LAB | Facility: HOSPITAL | Age: 44
End: 2019-02-27
Payer: OTHER GOVERNMENT

## 2019-02-27 VITALS
HEIGHT: 63 IN | OXYGEN SATURATION: 100 % | DIASTOLIC BLOOD PRESSURE: 62 MMHG | TEMPERATURE: 98.3 F | BODY MASS INDEX: 25.23 KG/M2 | WEIGHT: 142.4 LBS | HEART RATE: 88 BPM | SYSTOLIC BLOOD PRESSURE: 104 MMHG

## 2019-02-27 DIAGNOSIS — R17 ELEVATED BILIRUBIN: ICD-10-CM

## 2019-02-27 DIAGNOSIS — R79.89 LOW VITAMIN D LEVEL: ICD-10-CM

## 2019-02-27 DIAGNOSIS — R17 ELEVATED BILIRUBIN: Primary | ICD-10-CM

## 2019-02-27 LAB
ALBUMIN SERPL BCP-MCNC: 3.8 G/DL (ref 3.5–5)
ALP SERPL-CCNC: 60 U/L (ref 46–116)
ALT SERPL W P-5'-P-CCNC: 24 U/L (ref 12–78)
AST SERPL W P-5'-P-CCNC: 23 U/L (ref 5–45)
BILIRUB DIRECT SERPL-MCNC: 0.29 MG/DL (ref 0–0.2)
BILIRUB SERPL-MCNC: 1.4 MG/DL (ref 0.2–1)
PROT SERPL-MCNC: 7.4 G/DL (ref 6.4–8.2)

## 2019-02-27 PROCEDURE — 99214 OFFICE O/P EST MOD 30 MIN: CPT | Performed by: NURSE PRACTITIONER

## 2019-02-27 PROCEDURE — 80076 HEPATIC FUNCTION PANEL: CPT

## 2019-02-27 PROCEDURE — 36415 COLL VENOUS BLD VENIPUNCTURE: CPT

## 2019-02-27 NOTE — PATIENT INSTRUCTIONS
Will follow up with gastro if you become symptomatic  Obtain labs  Will call with results  Gallstones   WHAT YOU NEED TO KNOW:   Gallstones, also called cholelithiasis, are hard substances that form in your gallbladder or bile duct  Your gallbladder and bile duct are located on the right side of your abdomen, near your liver  Your gallbladder stores bile, which helps break down the fat that you eat  Your gallbladder also helps remove certain chemicals from your body  DISCHARGE INSTRUCTIONS:   Medicines:   · Prescription pain medicine  may be given  Ask your healthcare provider how to take this medicine safely  · Take your medicine as directed  Contact your healthcare provider if you think your medicine is not helping or if you have side effects  Tell him if you are allergic to any medicine  Keep a list of the medicines, vitamins, and herbs you take  Include the amounts, and when and why you take them  Bring the list or the pill bottles to follow-up visits  Carry your medicine list with you in case of an emergency  Follow up with your healthcare provider as directed:  Write down your questions so you remember to ask them during your visits  Eat a variety of healthy foods: This may help you have more energy and heal faster  Healthy foods include fruit, vegetables, whole-grain breads, low-fat dairy products, beans, lean meat, and fish  Ask if you need to be on a special diet  Exercise as directed:  Talk to your healthcare provider about the best exercise plan for you  Exercise can help you lose weight and improve your health  Contact your healthcare provider if:   · You have nausea and are vomiting  · Your urine is dark  · You have rina-colored bowel movements  · You have questions or concerns about your condition or care  Return to the emergency department if:   · You have a fever and chills  · Your skin or eyes turn yellow      · You have severe pain in your upper abdomen, just below the right ribcage  © 2017 [facility] Information is for End User's use only and may not be sold, redistributed or otherwise used for commercial purposes  All illustrations and images included in CareNotes® are the copyrighted property of [employer]  or Devin Sharma  The above information is an  only  It is not intended as medical advice for individual conditions or treatments  Talk to your doctor, nurse or pharmacist before following any medical regimen to see if it is safe and effective for you

## 2019-02-28 PROBLEM — K80.20 CALCULUS OF GALLBLADDER WITHOUT CHOLECYSTITIS WITHOUT OBSTRUCTION: Status: ACTIVE | Noted: 2019-02-28

## 2019-02-28 NOTE — PROGRESS NOTES
Assessment/Plan:    Elevated bilirubin  Will repeat labs and referred to Gastroenterology if necessary  Patient has a gallstone is nonobstructive and she is not symptomatic  Denies abdominal pain nausea vomiting    Calculus of gallbladder without cholecystitis without obstruction  Ultrasound completed  Patient is asymptomatic  Does have elevated bilirubin level  Will recheck   Refer to Gastroenterology as needed    Low vitamin D level  Continues to use vitamin-D supplementation  reports feeling well         Problem List Items Addressed This Visit        Other    Low vitamin D level     Continues to use vitamin-D supplementation  reports feeling well         Elevated bilirubin - Primary     Will repeat labs and referred to Gastroenterology if necessary  Patient has a gallstone is nonobstructive and she is not symptomatic  Denies abdominal pain nausea vomiting         Relevant Orders    Bilirubin, direct    Hepatic function panel (Completed)            Subjective:      Patient ID: Luiz Gann is a 37 y o  female  Pt is here to evaluate labs and ultrasound results  Feels well today  Has been taking her vitamin D and does not feel as fatigued as before  No reports of nausea vomiting abdominal pain fevers chills  Is concerned about the elevated bilirubin level  Is aware of the ultrasound result of a single mobile 1 7 centimeter gallstone  Denies any pain after eating, denies any discomfort , denies any jaundice  Discussed the possibility of referral to Gastroenterology  Would like to repeat the labs and then make a decision  The following portions of the patient's history were reviewed and updated as appropriate: allergies, current medications, past family history, past medical history, past social history, past surgical history and problem list     Review of Systems   Constitutional: Negative  HENT: Negative  Eyes: Negative  Respiratory: Negative  Cardiovascular: Negative  Gastrointestinal: Negative  Endocrine: Negative  Genitourinary: Negative  Musculoskeletal: Negative  Skin: Negative  Allergic/Immunologic: Negative  Neurological: Negative  Hematological: Negative  Psychiatric/Behavioral: Negative  Objective:      /62   Pulse 88   Temp 98 3 °F (36 8 °C) (Tympanic)   Ht 5' 3" (1 6 m)   Wt 64 6 kg (142 lb 6 4 oz)   LMP  (LMP Unknown)   SpO2 100%   BMI 25 23 kg/m²          Physical Exam   Constitutional: She is oriented to person, place, and time  She appears well-developed and well-nourished  HENT:   Head: Normocephalic and atraumatic  Right Ear: External ear normal    Left Ear: External ear normal    Eyes: Pupils are equal, round, and reactive to light  Neck: Normal range of motion  Cardiovascular: Normal rate and regular rhythm  Pulmonary/Chest: Effort normal    Abdominal: There is no tenderness  There is no guarding  Musculoskeletal: Normal range of motion  Neurological: She is alert and oriented to person, place, and time  Skin: Skin is warm and dry  Psychiatric: She has a normal mood and affect  Nursing note and vitals reviewed          Labs:    Lab Results   Component Value Date    WBC 6 53 01/24/2019    HGB 14 2 01/24/2019    HCT 43 9 01/24/2019    MCV 91 01/24/2019     01/24/2019     Lab Results   Component Value Date    K 4 2 01/24/2019     01/24/2019    CO2 26 01/24/2019    BUN 8 01/24/2019    CREATININE 0 69 01/24/2019    GLUF 80 01/24/2019    CALCIUM 8 6 01/24/2019    AST 23 02/27/2019    ALT 24 02/27/2019    ALKPHOS 60 02/27/2019    EGFR 105 01/24/2019     Lab Results   Component Value Date    CALCIUM 8 6 01/24/2019    K 4 2 01/24/2019    CO2 26 01/24/2019     01/24/2019    BUN 8 01/24/2019    CREATININE 0 69 01/24/2019

## 2019-02-28 NOTE — ASSESSMENT & PLAN NOTE
Ultrasound completed  Patient is asymptomatic  Does have elevated bilirubin level  Will recheck     Refer to Gastroenterology as needed

## 2019-02-28 NOTE — ASSESSMENT & PLAN NOTE
Will repeat labs and referred to Gastroenterology if necessary  Patient has a gallstone is nonobstructive and she is not symptomatic    Denies abdominal pain nausea vomiting

## 2019-03-01 ENCOUNTER — TELEPHONE (OUTPATIENT)
Dept: FAMILY MEDICINE CLINIC | Facility: CLINIC | Age: 44
End: 2019-03-01

## 2019-03-01 ENCOUNTER — OFFICE VISIT (OUTPATIENT)
Dept: OBGYN CLINIC | Facility: MEDICAL CENTER | Age: 44
End: 2019-03-01
Payer: OTHER GOVERNMENT

## 2019-03-01 VITALS
HEART RATE: 87 BPM | HEIGHT: 63 IN | SYSTOLIC BLOOD PRESSURE: 104 MMHG | BODY MASS INDEX: 25.16 KG/M2 | DIASTOLIC BLOOD PRESSURE: 67 MMHG | WEIGHT: 142 LBS

## 2019-03-01 DIAGNOSIS — M25.521 ELBOW PAIN, RIGHT: Primary | ICD-10-CM

## 2019-03-01 DIAGNOSIS — M77.11 LATERAL EPICONDYLITIS OF RIGHT ELBOW: ICD-10-CM

## 2019-03-01 DIAGNOSIS — K80.20 CALCULUS OF GALLBLADDER WITHOUT CHOLECYSTITIS WITHOUT OBSTRUCTION: Primary | ICD-10-CM

## 2019-03-01 PROCEDURE — 99213 OFFICE O/P EST LOW 20 MIN: CPT | Performed by: ORTHOPAEDIC SURGERY

## 2019-03-01 PROCEDURE — 20551 NJX 1 TENDON ORIGIN/INSJ: CPT | Performed by: ORTHOPAEDIC SURGERY

## 2019-03-01 RX ORDER — LIDOCAINE HYDROCHLORIDE 10 MG/ML
1 INJECTION, SOLUTION INFILTRATION; PERINEURAL
Status: COMPLETED | OUTPATIENT
Start: 2019-03-01 | End: 2019-03-01

## 2019-03-01 RX ORDER — BETAMETHASONE SODIUM PHOSPHATE AND BETAMETHASONE ACETATE 3; 3 MG/ML; MG/ML
6 INJECTION, SUSPENSION INTRA-ARTICULAR; INTRALESIONAL; INTRAMUSCULAR; SOFT TISSUE
Status: COMPLETED | OUTPATIENT
Start: 2019-03-01 | End: 2019-03-01

## 2019-03-01 RX ADMIN — LIDOCAINE HYDROCHLORIDE 1 ML: 10 INJECTION, SOLUTION INFILTRATION; PERINEURAL at 08:39

## 2019-03-01 RX ADMIN — BETAMETHASONE SODIUM PHOSPHATE AND BETAMETHASONE ACETATE 6 MG: 3; 3 INJECTION, SUSPENSION INTRA-ARTICULAR; INTRALESIONAL; INTRAMUSCULAR; SOFT TISSUE at 08:39

## 2019-03-01 NOTE — TELEPHONE ENCOUNTER
Spoke with patient regarding lab results  Does report having episodes of discomfort and nausea  Referral to  GI made  GI office number given to patient

## 2019-03-01 NOTE — PROGRESS NOTES
Assessment:  1  Elbow pain, right     2  Lateral epicondylitis of right elbow         Plan:  The patient returns for right laterla elbow pain  She did find benefit from previous steroid injection  Today she complains of lesser right lateral elbow pain  She has mild replication of pain with resisted right wrist extension  She is provided with right lateral elbow steroid injection  She should follow up in 3 months  To do next visit:  Return in about 3 months (around 6/1/2019)  The above stated was discussed in layman's terms and the patient expressed understanding  All questions were answered to the patient's satisfaction  Scribe Attestation    I,:   Rocio Mckeon am acting as a scribe while in the presence of the attending physician :        I,:   Chidi Marks MD personally performed the services described in this documentation    as scribed in my presence :              Subjective:   Orly Abel is a 37 y o  female who presents for right elbow pain  She is s/sp right elbow injection, 11/9/18, with about 75% benefit  Today she complains of intermittent right posterolateral elbow and posterolateral forearm pain  She rates her pain at 0/10 and 5-6/10 at its worse  Rolling in bed aggravates  Massage has helped  She denies medications for this issue  She does use ice with benefit  She does stretching learned from physical therapy          Review of systems negative unless otherwise specified in HPI    Past Medical History:   Diagnosis Date    Depression     Food allergy     scallops    Gall stone     Ovarian cyst     right    Pelvic pain in female     PONV (postoperative nausea and vomiting)     Wears glasses        Past Surgical History:   Procedure Laterality Date    COLONOSCOPY      ENDOMETRIAL ABLATION      HYSTERECTOMY      Partial    KNEE ARTHROSCOPY Left     HI LAP,DIAGNOSTIC ABDOMEN N/A 9/16/2016    Procedure: LAPAROSCOPY DIAGNOSTIC;  Surgeon: Modesta Hernandez DO;  Location: [facility];  Service: Gynecology    ID LAP,RMV  ADNEXAL STRUCTURE Right 9/16/2016    Procedure:  Eric Jim;  Surgeon: Dianne Vyas DO;  Location: [facility];  Service: Gynecology    REFRACTIVE SURGERY Bilateral     TONSILLECTOMY      WISDOM TOOTH EXTRACTION         Family History   Adopted: Yes   Problem Relation Age of Onset    Alcohol abuse Father     Substance Abuse Father     Diabetes Sister     Cancer Maternal Grandmother     Alcohol abuse Maternal Grandfather     Heart disease Maternal Grandfather     Hypertension Maternal Grandfather     Cancer Maternal Aunt        Social History     Occupational History    Not on file   Tobacco Use    Smoking status: Never Smoker    Smokeless tobacco: Never Used   Substance and Sexual Activity    Alcohol use: Yes     Comment: few x month    Drug use: No    Sexual activity: Not on file         Current Outpatient Medications:     amitriptyline (ELAVIL) 25 mg tablet, 1 tablet q hs, Disp: 30 tablet, Rfl: 3    cholecalciferol (VITAMIN D3) 1,000 units tablet, Take 1 tablet (1,000 Units total) by mouth daily, Disp: 30 tablet, Rfl: 3    Multiple Vitamin (MULTIVITAMIN) capsule, Take 1 capsule by mouth daily, Disp: , Rfl:     Allergies   Allergen Reactions    Penicillins Hives    Codeine GI Intolerance    Shellfish-Derived Products Other (See Comments)     Intolerance to scallops -reaction severe vomitting    Tetracyclines & Related GI Intolerance            Vitals:    03/01/19 0810   BP: 104/67   Pulse: 87       Objective:  Physical exam  · General: Awake, Alert, Oriented  · Eyes: Pupils equal, round and reactive to light  · Heart: regular rate and rhythm  · Lungs: No audible wheezing  · Abdomen: soft                    Ortho Exam   Right elbow:  Patient able to flex and extend with full ROM  Full pronation and supination  Mild pain with resisted wrist extension  Negative ulnar tinnels   No erythema or ecchymosis  Non-tender lateral epicondyle  Sensation intact  Finger well perfused     Diagnostics, reviewed and taken today if performed as documented:    None performed      The attending physician has personally reviewed the pertinent films in PACS and interpretation is as follows:      Procedures, if performed today:    Medium joint arthrocentesis: R elbow  Date/Time: 3/1/2019 8:39 AM  Consent given by: patient  Site marked: site marked  Timeout: Immediately prior to procedure a time out was called to verify the correct patient, procedure, equipment, support staff and site/side marked as required   Supporting Documentation  Indications: pain   Procedure Details  Location: elbow - R elbow (Lateral epicondyle )  Preparation: Patient was prepped and draped in the usual sterile fashion  Needle size: 22 G  Approach: posterolateral  Medications administered: 1 mL lidocaine 1 %; 6 mg betamethasone acetate-betamethasone sodium phosphate 6 (3-3) mg/mL (1ml 0 5% bupivacaine)    Patient tolerance: patient tolerated the procedure well with no immediate complications  Dressing:  Sterile dressing applied            Portions of the record may have been created with voice recognition software   Occasional wrong word or "sound a like" substitutions may have occurred due to the inherent limitations of voice recognition software   Read the chart carefully and recognize, using context, where substitutions have occurred

## 2019-03-20 ENCOUNTER — TELEPHONE (OUTPATIENT)
Dept: OBGYN CLINIC | Facility: CLINIC | Age: 44
End: 2019-03-20

## 2019-04-01 ENCOUNTER — ANNUAL EXAM (OUTPATIENT)
Dept: OBGYN CLINIC | Facility: CLINIC | Age: 44
End: 2019-04-01
Payer: OTHER GOVERNMENT

## 2019-04-01 VITALS
WEIGHT: 142.4 LBS | DIASTOLIC BLOOD PRESSURE: 56 MMHG | SYSTOLIC BLOOD PRESSURE: 108 MMHG | HEIGHT: 65 IN | BODY MASS INDEX: 23.72 KG/M2

## 2019-04-01 DIAGNOSIS — Z11.51 SCREENING FOR HUMAN PAPILLOMAVIRUS (HPV): ICD-10-CM

## 2019-04-01 DIAGNOSIS — Z90.721 STATUS POST ABDOMINAL HYSTERECTOMY AND RIGHT SALPINGO-OOPHORECTOMY: ICD-10-CM

## 2019-04-01 DIAGNOSIS — Z01.419 WOMEN'S ANNUAL ROUTINE GYNECOLOGICAL EXAMINATION: Primary | ICD-10-CM

## 2019-04-01 DIAGNOSIS — Z92.89 HISTORY OF MAMMOGRAM: ICD-10-CM

## 2019-04-01 DIAGNOSIS — Z90.710 STATUS POST ABDOMINAL HYSTERECTOMY AND RIGHT SALPINGO-OOPHORECTOMY: ICD-10-CM

## 2019-04-01 DIAGNOSIS — R10.2 PELVIC PAIN IN FEMALE: ICD-10-CM

## 2019-04-01 DIAGNOSIS — Z90.711 STATUS POST LAPAROSCOPIC SUPRACERVICAL HYSTERECTOMY: ICD-10-CM

## 2019-04-01 DIAGNOSIS — Z90.79 STATUS POST ABDOMINAL HYSTERECTOMY AND RIGHT SALPINGO-OOPHORECTOMY: ICD-10-CM

## 2019-04-01 PROCEDURE — G0145 SCR C/V CYTO,THINLAYER,RESCR: HCPCS | Performed by: OBSTETRICS & GYNECOLOGY

## 2019-04-01 PROCEDURE — 99396 PREV VISIT EST AGE 40-64: CPT | Performed by: OBSTETRICS & GYNECOLOGY

## 2019-04-01 PROCEDURE — 87624 HPV HI-RISK TYP POOLED RSLT: CPT | Performed by: OBSTETRICS & GYNECOLOGY

## 2019-04-02 LAB
HPV HR 12 DNA CVX QL NAA+PROBE: NEGATIVE
HPV16 DNA CVX QL NAA+PROBE: NEGATIVE
HPV18 DNA CVX QL NAA+PROBE: NEGATIVE

## 2019-04-05 LAB
LAB AP GYN PRIMARY INTERPRETATION: NORMAL
Lab: NORMAL

## 2019-04-09 ENCOUNTER — HOSPITAL ENCOUNTER (OUTPATIENT)
Dept: ULTRASOUND IMAGING | Facility: HOSPITAL | Age: 44
Discharge: HOME | End: 2019-04-09
Payer: OTHER GOVERNMENT

## 2019-04-09 ENCOUNTER — HOSPITAL ENCOUNTER (OUTPATIENT)
Dept: MAMMOGRAPHY | Facility: CLINIC | Age: 44
Discharge: HOME | End: 2019-04-09
Payer: OTHER GOVERNMENT

## 2019-04-09 VITALS — HEIGHT: 65 IN | WEIGHT: 142 LBS | BODY MASS INDEX: 23.66 KG/M2

## 2019-04-09 DIAGNOSIS — Z92.89 HISTORY OF MAMMOGRAM: ICD-10-CM

## 2019-04-09 DIAGNOSIS — Z12.39 SCREENING FOR BREAST CANCER: Primary | ICD-10-CM

## 2019-04-09 DIAGNOSIS — R10.2 PELVIC PAIN IN FEMALE: ICD-10-CM

## 2019-04-09 DIAGNOSIS — Z12.39 SCREENING FOR BREAST CANCER: ICD-10-CM

## 2019-04-09 PROCEDURE — 76856 US EXAM PELVIC COMPLETE: CPT

## 2019-04-09 PROCEDURE — 77067 SCR MAMMO BI INCL CAD: CPT

## 2019-04-09 PROCEDURE — 76830 TRANSVAGINAL US NON-OB: CPT

## 2019-04-09 PROCEDURE — 77063 BREAST TOMOSYNTHESIS BI: CPT

## 2019-04-15 ENCOUNTER — OFFICE VISIT (OUTPATIENT)
Dept: OBGYN CLINIC | Facility: CLINIC | Age: 44
End: 2019-04-15
Payer: OTHER GOVERNMENT

## 2019-04-15 VITALS
DIASTOLIC BLOOD PRESSURE: 70 MMHG | HEIGHT: 65 IN | BODY MASS INDEX: 23.49 KG/M2 | WEIGHT: 141 LBS | SYSTOLIC BLOOD PRESSURE: 104 MMHG

## 2019-04-15 DIAGNOSIS — Z90.711 STATUS POST LAPAROSCOPIC SUPRACERVICAL HYSTERECTOMY: ICD-10-CM

## 2019-04-15 DIAGNOSIS — R10.31 RIGHT LOWER QUADRANT PAIN: Primary | ICD-10-CM

## 2019-04-15 DIAGNOSIS — Z90.79 STATUS POST BILATERAL SALPINGECTOMY: ICD-10-CM

## 2019-04-15 DIAGNOSIS — Z90.721 STATUS POST RIGHT OOPHORECTOMY: ICD-10-CM

## 2019-04-15 PROCEDURE — 99213 OFFICE O/P EST LOW 20 MIN: CPT | Performed by: OBSTETRICS & GYNECOLOGY

## 2019-04-15 RX ORDER — IBUPROFEN 600 MG/1
600 TABLET ORAL EVERY 6 HOURS PRN
Qty: 30 TABLET | Refills: 3 | Status: SHIPPED | OUTPATIENT
Start: 2019-04-15 | End: 2019-05-20 | Stop reason: ALTCHOICE

## 2019-04-17 DIAGNOSIS — F32.A DEPRESSION, UNSPECIFIED DEPRESSION TYPE: ICD-10-CM

## 2019-04-18 DIAGNOSIS — F32.A DEPRESSION, UNSPECIFIED DEPRESSION TYPE: ICD-10-CM

## 2019-04-18 RX ORDER — AMITRIPTYLINE HYDROCHLORIDE 25 MG/1
TABLET, FILM COATED ORAL
Qty: 30 TABLET | Refills: 3 | Status: SHIPPED | OUTPATIENT
Start: 2019-04-18 | End: 2020-02-26 | Stop reason: SDUPTHER

## 2019-04-18 RX ORDER — AMITRIPTYLINE HYDROCHLORIDE 25 MG/1
TABLET, FILM COATED ORAL
Qty: 30 TABLET | Refills: 3 | OUTPATIENT
Start: 2019-04-18

## 2019-04-19 ENCOUNTER — CONSULT (OUTPATIENT)
Dept: SURGERY | Facility: CLINIC | Age: 44
End: 2019-04-19
Payer: OTHER GOVERNMENT

## 2019-04-19 VITALS
WEIGHT: 141 LBS | TEMPERATURE: 97.7 F | HEIGHT: 65 IN | RESPIRATION RATE: 18 BRPM | SYSTOLIC BLOOD PRESSURE: 100 MMHG | HEART RATE: 88 BPM | BODY MASS INDEX: 23.49 KG/M2 | DIASTOLIC BLOOD PRESSURE: 74 MMHG

## 2019-04-19 DIAGNOSIS — R10.31 COLICKY RLQ ABDOMINAL PAIN: Primary | ICD-10-CM

## 2019-04-19 DIAGNOSIS — K80.51 CALCULUS OF BILE DUCT WITHOUT CHOLECYSTITIS WITH OBSTRUCTION: Primary | ICD-10-CM

## 2019-04-19 PROCEDURE — 99243 OFF/OP CNSLTJ NEW/EST LOW 30: CPT | Performed by: SURGERY

## 2019-04-24 DIAGNOSIS — R10.31 COLICKY RLQ ABDOMINAL PAIN: Primary | ICD-10-CM

## 2019-04-26 ENCOUNTER — TELEPHONE (OUTPATIENT)
Dept: SURGERY | Facility: CLINIC | Age: 44
End: 2019-04-26

## 2019-04-27 ENCOUNTER — APPOINTMENT (OUTPATIENT)
Dept: LAB | Facility: HOSPITAL | Age: 44
End: 2019-04-27
Attending: SURGERY
Payer: OTHER GOVERNMENT

## 2019-04-27 DIAGNOSIS — K80.51 CALCULUS OF BILE DUCT WITHOUT CHOLECYSTITIS WITH OBSTRUCTION: ICD-10-CM

## 2019-04-27 LAB
ALBUMIN SERPL BCP-MCNC: 3.6 G/DL (ref 3.5–5)
ALP SERPL-CCNC: 53 U/L (ref 46–116)
ALT SERPL W P-5'-P-CCNC: 20 U/L (ref 12–78)
ANION GAP SERPL CALCULATED.3IONS-SCNC: 7 MMOL/L (ref 4–13)
AST SERPL W P-5'-P-CCNC: 21 U/L (ref 5–45)
BILIRUB SERPL-MCNC: 1.2 MG/DL (ref 0.2–1)
BUN SERPL-MCNC: 12 MG/DL (ref 5–25)
CALCIUM SERPL-MCNC: 8.5 MG/DL (ref 8.3–10.1)
CHLORIDE SERPL-SCNC: 106 MMOL/L (ref 100–108)
CO2 SERPL-SCNC: 27 MMOL/L (ref 21–32)
CREAT SERPL-MCNC: 0.7 MG/DL (ref 0.6–1.3)
GFR SERPL CREATININE-BSD FRML MDRD: 105 ML/MIN/1.73SQ M
GLUCOSE P FAST SERPL-MCNC: 85 MG/DL (ref 65–99)
POTASSIUM SERPL-SCNC: 4.3 MMOL/L (ref 3.5–5.3)
PROT SERPL-MCNC: 7 G/DL (ref 6.4–8.2)
SODIUM SERPL-SCNC: 140 MMOL/L (ref 136–145)

## 2019-04-27 PROCEDURE — 80053 COMPREHEN METABOLIC PANEL: CPT

## 2019-04-27 PROCEDURE — 36415 COLL VENOUS BLD VENIPUNCTURE: CPT

## 2019-05-03 ENCOUNTER — HOSPITAL ENCOUNTER (OUTPATIENT)
Dept: CT IMAGING | Facility: CLINIC | Age: 44
Discharge: HOME | End: 2019-05-03
Attending: SURGERY
Payer: OTHER GOVERNMENT

## 2019-05-03 DIAGNOSIS — R10.31 COLICKY RLQ ABDOMINAL PAIN: ICD-10-CM

## 2019-05-03 PROCEDURE — 74177 CT ABD & PELVIS W/CONTRAST: CPT

## 2019-05-03 RX ADMIN — IOHEXOL 100 ML: 350 INJECTION, SOLUTION INTRAVENOUS at 11:21

## 2019-05-07 ENCOUNTER — ANESTHESIA EVENT (OUTPATIENT)
Dept: PERIOP | Facility: HOSPITAL | Age: 44
End: 2019-05-07
Payer: OTHER GOVERNMENT

## 2019-05-08 ENCOUNTER — ANESTHESIA (OUTPATIENT)
Dept: PERIOP | Facility: HOSPITAL | Age: 44
End: 2019-05-08
Payer: OTHER GOVERNMENT

## 2019-05-08 ENCOUNTER — HOSPITAL ENCOUNTER (OUTPATIENT)
Facility: HOSPITAL | Age: 44
Setting detail: OUTPATIENT SURGERY
Discharge: HOME | End: 2019-05-08
Attending: SURGERY | Admitting: SURGERY
Payer: OTHER GOVERNMENT

## 2019-05-08 ENCOUNTER — APPOINTMENT (OUTPATIENT)
Dept: RADIOLOGY | Facility: HOSPITAL | Age: 44
End: 2019-05-08
Payer: OTHER GOVERNMENT

## 2019-05-08 VITALS
SYSTOLIC BLOOD PRESSURE: 112 MMHG | DIASTOLIC BLOOD PRESSURE: 68 MMHG | TEMPERATURE: 97.4 F | BODY MASS INDEX: 23.32 KG/M2 | OXYGEN SATURATION: 100 % | WEIGHT: 140 LBS | RESPIRATION RATE: 17 BRPM | HEART RATE: 69 BPM | HEIGHT: 65 IN

## 2019-05-08 DIAGNOSIS — K80.20 CALCULUS OF GALLBLADDER WITHOUT CHOLECYSTITIS WITHOUT OBSTRUCTION: ICD-10-CM

## 2019-05-08 PROCEDURE — 88304 TISSUE EXAM BY PATHOLOGIST: CPT | Performed by: PATHOLOGY

## 2019-05-08 PROCEDURE — 47563 LAPARO CHOLECYSTECTOMY/GRAPH: CPT | Performed by: PHYSICIAN ASSISTANT

## 2019-05-08 PROCEDURE — NC001 PR NO CHARGE: Performed by: SURGERY

## 2019-05-08 PROCEDURE — 74300 X-RAY BILE DUCTS/PANCREAS: CPT

## 2019-05-08 PROCEDURE — 45378 DIAGNOSTIC COLONOSCOPY: CPT | Performed by: SURGERY

## 2019-05-08 RX ORDER — SCOLOPAMINE TRANSDERMAL SYSTEM 1 MG/1
1 PATCH, EXTENDED RELEASE TRANSDERMAL ONCE AS NEEDED
Status: DISCONTINUED | OUTPATIENT
Start: 2019-05-08 | End: 2019-05-08 | Stop reason: HOSPADM

## 2019-05-08 RX ORDER — EPHEDRINE SULFATE 50 MG/ML
INJECTION INTRAVENOUS AS NEEDED
Status: DISCONTINUED | OUTPATIENT
Start: 2019-05-08 | End: 2019-05-08 | Stop reason: SURG

## 2019-05-08 RX ORDER — DEXTROSE AND SODIUM CHLORIDE 5; .45 G/100ML; G/100ML
80 INJECTION, SOLUTION INTRAVENOUS CONTINUOUS
Status: DISCONTINUED | OUTPATIENT
Start: 2019-05-08 | End: 2019-05-08 | Stop reason: HOSPADM

## 2019-05-08 RX ORDER — DOCUSATE SODIUM 100 MG/1
100 CAPSULE, LIQUID FILLED ORAL 2 TIMES DAILY
Qty: 60 CAPSULE | Refills: 0 | Status: SHIPPED | OUTPATIENT
Start: 2019-05-08 | End: 2019-05-20 | Stop reason: ALTCHOICE

## 2019-05-08 RX ORDER — HYDROCODONE BITARTRATE AND ACETAMINOPHEN 5; 325 MG/1; MG/1
1 TABLET ORAL EVERY 4 HOURS PRN
Qty: 10 TABLET | Refills: 0 | Status: SHIPPED | OUTPATIENT
Start: 2019-05-08 | End: 2019-05-20 | Stop reason: ALTCHOICE

## 2019-05-08 RX ORDER — HYDROCODONE BITARTRATE AND ACETAMINOPHEN 5; 325 MG/1; MG/1
1 TABLET ORAL EVERY 4 HOURS PRN
Status: DISCONTINUED | OUTPATIENT
Start: 2019-05-08 | End: 2019-05-08 | Stop reason: HOSPADM

## 2019-05-08 RX ORDER — MORPHINE SULFATE 10 MG/ML
2 INJECTION, SOLUTION INTRAMUSCULAR; INTRAVENOUS EVERY 2 HOUR PRN
Status: DISCONTINUED | OUTPATIENT
Start: 2019-05-08 | End: 2019-05-08 | Stop reason: HOSPADM

## 2019-05-08 RX ORDER — DEXAMETHASONE SODIUM PHOSPHATE 4 MG/ML
INJECTION, SOLUTION INTRA-ARTICULAR; INTRALESIONAL; INTRAMUSCULAR; INTRAVENOUS; SOFT TISSUE AS NEEDED
Status: DISCONTINUED | OUTPATIENT
Start: 2019-05-08 | End: 2019-05-08 | Stop reason: SURG

## 2019-05-08 RX ORDER — ONDANSETRON 2 MG/ML
4 INJECTION INTRAMUSCULAR; INTRAVENOUS ONCE AS NEEDED
Status: DISCONTINUED | OUTPATIENT
Start: 2019-05-08 | End: 2019-05-08 | Stop reason: HOSPADM

## 2019-05-08 RX ORDER — GLYCOPYRROLATE 0.2 MG/ML
INJECTION INTRAMUSCULAR; INTRAVENOUS AS NEEDED
Status: DISCONTINUED | OUTPATIENT
Start: 2019-05-08 | End: 2019-05-08 | Stop reason: SURG

## 2019-05-08 RX ORDER — ONDANSETRON 2 MG/ML
INJECTION INTRAMUSCULAR; INTRAVENOUS AS NEEDED
Status: DISCONTINUED | OUTPATIENT
Start: 2019-05-08 | End: 2019-05-08 | Stop reason: SURG

## 2019-05-08 RX ORDER — ONDANSETRON 4 MG/1
4 TABLET, ORALLY DISINTEGRATING ORAL EVERY 8 HOURS PRN
Status: DISCONTINUED | OUTPATIENT
Start: 2019-05-08 | End: 2019-05-08 | Stop reason: HOSPADM

## 2019-05-08 RX ORDER — SODIUM CHLORIDE 9 MG/ML
125 INJECTION, SOLUTION INTRAVENOUS CONTINUOUS
Status: DISCONTINUED | OUTPATIENT
Start: 2019-05-08 | End: 2019-05-08 | Stop reason: HOSPADM

## 2019-05-08 RX ORDER — MAGNESIUM HYDROXIDE 1200 MG/15ML
LIQUID ORAL AS NEEDED
Status: DISCONTINUED | OUTPATIENT
Start: 2019-05-08 | End: 2019-05-08 | Stop reason: HOSPADM

## 2019-05-08 RX ORDER — HYDROMORPHONE HCL/PF 1 MG/ML
0.5 SYRINGE (ML) INJECTION
Status: DISCONTINUED | OUTPATIENT
Start: 2019-05-08 | End: 2019-05-08 | Stop reason: HOSPADM

## 2019-05-08 RX ORDER — ONDANSETRON 2 MG/ML
4 INJECTION INTRAMUSCULAR; INTRAVENOUS EVERY 8 HOURS PRN
Status: DISCONTINUED | OUTPATIENT
Start: 2019-05-08 | End: 2019-05-08 | Stop reason: HOSPADM

## 2019-05-08 RX ORDER — MIDAZOLAM HYDROCHLORIDE 1 MG/ML
INJECTION INTRAMUSCULAR; INTRAVENOUS AS NEEDED
Status: DISCONTINUED | OUTPATIENT
Start: 2019-05-08 | End: 2019-05-08 | Stop reason: SURG

## 2019-05-08 RX ORDER — ROCURONIUM BROMIDE 10 MG/ML
INJECTION, SOLUTION INTRAVENOUS AS NEEDED
Status: DISCONTINUED | OUTPATIENT
Start: 2019-05-08 | End: 2019-05-08 | Stop reason: SURG

## 2019-05-08 RX ORDER — ACETAMINOPHEN 325 MG/1
650 TABLET ORAL EVERY 6 HOURS PRN
Status: DISCONTINUED | OUTPATIENT
Start: 2019-05-08 | End: 2019-05-08 | Stop reason: HOSPADM

## 2019-05-08 RX ORDER — HYDROMORPHONE HCL/PF 1 MG/ML
1 SYRINGE (ML) INJECTION
Status: DISCONTINUED | OUTPATIENT
Start: 2019-05-08 | End: 2019-05-08 | Stop reason: HOSPADM

## 2019-05-08 RX ORDER — DIPHENHYDRAMINE HYDROCHLORIDE 50 MG/ML
INJECTION INTRAMUSCULAR; INTRAVENOUS AS NEEDED
Status: DISCONTINUED | OUTPATIENT
Start: 2019-05-08 | End: 2019-05-08 | Stop reason: SURG

## 2019-05-08 RX ORDER — ONDANSETRON 4 MG/1
4 TABLET, FILM COATED ORAL EVERY 8 HOURS PRN
Qty: 20 TABLET | Refills: 0 | Status: SHIPPED | OUTPATIENT
Start: 2019-05-08 | End: 2019-05-20 | Stop reason: ALTCHOICE

## 2019-05-08 RX ORDER — KETOROLAC TROMETHAMINE 30 MG/ML
INJECTION, SOLUTION INTRAMUSCULAR; INTRAVENOUS AS NEEDED
Status: DISCONTINUED | OUTPATIENT
Start: 2019-05-08 | End: 2019-05-08 | Stop reason: SURG

## 2019-05-08 RX ORDER — PROPOFOL 10 MG/ML
INJECTION, EMULSION INTRAVENOUS AS NEEDED
Status: DISCONTINUED | OUTPATIENT
Start: 2019-05-08 | End: 2019-05-08 | Stop reason: SURG

## 2019-05-08 RX ORDER — CEFAZOLIN SODIUM 1 G/50ML
1000 SOLUTION INTRAVENOUS ONCE
Status: COMPLETED | OUTPATIENT
Start: 2019-05-08 | End: 2019-05-08

## 2019-05-08 RX ORDER — FENTANYL CITRATE 50 UG/ML
INJECTION, SOLUTION INTRAMUSCULAR; INTRAVENOUS AS NEEDED
Status: DISCONTINUED | OUTPATIENT
Start: 2019-05-08 | End: 2019-05-08 | Stop reason: SURG

## 2019-05-08 RX ADMIN — PROPOFOL 150 MG: 10 INJECTION, EMULSION INTRAVENOUS at 07:40

## 2019-05-08 RX ADMIN — GLYCOPYRROLATE 0.1 MG: 0.2 INJECTION INTRAMUSCULAR; INTRAVENOUS at 07:49

## 2019-05-08 RX ADMIN — FENTANYL CITRATE 100 MCG: 50 INJECTION, SOLUTION INTRAMUSCULAR; INTRAVENOUS at 07:40

## 2019-05-08 RX ADMIN — ONDANSETRON HYDROCHLORIDE 4 MG: 2 INJECTION, SOLUTION INTRAVENOUS at 07:30

## 2019-05-08 RX ADMIN — MIDAZOLAM 2 MG: 1 INJECTION INTRAMUSCULAR; INTRAVENOUS at 07:30

## 2019-05-08 RX ADMIN — SUGAMMADEX 200 MG: 100 INJECTION, SOLUTION INTRAVENOUS at 08:46

## 2019-05-08 RX ADMIN — FENTANYL CITRATE 50 MCG: 50 INJECTION, SOLUTION INTRAMUSCULAR; INTRAVENOUS at 07:59

## 2019-05-08 RX ADMIN — SODIUM CHLORIDE: 0.9 INJECTION, SOLUTION INTRAVENOUS at 08:00

## 2019-05-08 RX ADMIN — HYDROCODONE BITARTRATE AND ACETAMINOPHEN 1 TABLET: 5; 325 TABLET ORAL at 11:03

## 2019-05-08 RX ADMIN — DEXAMETHASONE SODIUM PHOSPHATE 4 MG: 4 INJECTION, SOLUTION INTRAMUSCULAR; INTRAVENOUS at 07:57

## 2019-05-08 RX ADMIN — ROCURONIUM BROMIDE 30 MG: 10 INJECTION, SOLUTION INTRAVENOUS at 07:40

## 2019-05-08 RX ADMIN — SCOPALAMINE 1 PATCH: 1 PATCH, EXTENDED RELEASE TRANSDERMAL at 06:23

## 2019-05-08 RX ADMIN — EPHEDRINE SULFATE 5 MG: 50 INJECTION, SOLUTION INTRAVENOUS at 07:45

## 2019-05-08 RX ADMIN — CEFAZOLIN SODIUM 1000 MG: 1 SOLUTION INTRAVENOUS at 07:40

## 2019-05-08 RX ADMIN — LIDOCAINE HYDROCHLORIDE 100 MG: 20 INJECTION, SOLUTION INTRAVENOUS at 07:40

## 2019-05-08 RX ADMIN — KETOROLAC TROMETHAMINE 30 MG: 30 INJECTION, SOLUTION INTRAMUSCULAR at 08:50

## 2019-05-08 RX ADMIN — SODIUM CHLORIDE 125 ML/HR: 0.9 INJECTION, SOLUTION INTRAVENOUS at 06:19

## 2019-05-08 RX ADMIN — FENTANYL CITRATE 50 MCG: 50 INJECTION, SOLUTION INTRAMUSCULAR; INTRAVENOUS at 07:53

## 2019-05-08 RX ADMIN — DIPHENHYDRAMINE HYDROCHLORIDE 25 MG: 50 INJECTION INTRAMUSCULAR; INTRAVENOUS at 08:25

## 2019-05-08 RX ADMIN — SODIUM CHLORIDE: 0.9 INJECTION, SOLUTION INTRAVENOUS at 08:33

## 2019-05-09 ENCOUNTER — TELEPHONE (OUTPATIENT)
Dept: SURGERY | Facility: CLINIC | Age: 44
End: 2019-05-09

## 2019-05-15 ENCOUNTER — TELEPHONE (OUTPATIENT)
Dept: SURGERY | Facility: CLINIC | Age: 44
End: 2019-05-15

## 2019-05-16 ENCOUNTER — OFFICE VISIT (OUTPATIENT)
Dept: SURGERY | Facility: CLINIC | Age: 44
End: 2019-05-16

## 2019-05-16 VITALS
TEMPERATURE: 98.2 F | RESPIRATION RATE: 18 BRPM | BODY MASS INDEX: 23.32 KG/M2 | SYSTOLIC BLOOD PRESSURE: 114 MMHG | HEIGHT: 65 IN | DIASTOLIC BLOOD PRESSURE: 66 MMHG | WEIGHT: 140 LBS | HEART RATE: 102 BPM

## 2019-05-16 DIAGNOSIS — K81.9 CHOLECYSTITIS: ICD-10-CM

## 2019-05-16 DIAGNOSIS — T78.40XA ALLERGIC REACTION, INITIAL ENCOUNTER: Primary | ICD-10-CM

## 2019-05-16 PROCEDURE — 99024 POSTOP FOLLOW-UP VISIT: CPT | Performed by: PHYSICIAN ASSISTANT

## 2019-05-16 RX ORDER — METHYLPREDNISOLONE 4 MG/1
TABLET ORAL
Qty: 1 EACH | Refills: 0 | Status: SHIPPED | OUTPATIENT
Start: 2019-05-16 | End: 2019-05-20 | Stop reason: ALTCHOICE

## 2019-05-20 ENCOUNTER — OFFICE VISIT (OUTPATIENT)
Dept: FAMILY MEDICINE CLINIC | Facility: CLINIC | Age: 44
End: 2019-05-20
Payer: OTHER GOVERNMENT

## 2019-05-20 VITALS
SYSTOLIC BLOOD PRESSURE: 122 MMHG | BODY MASS INDEX: 23.16 KG/M2 | HEART RATE: 103 BPM | HEIGHT: 65 IN | DIASTOLIC BLOOD PRESSURE: 78 MMHG | WEIGHT: 139 LBS | OXYGEN SATURATION: 78 % | TEMPERATURE: 98.6 F

## 2019-05-20 DIAGNOSIS — L25.1: Primary | ICD-10-CM

## 2019-05-20 DIAGNOSIS — L23.9 ALLERGIC CONTACT DERMATITIS, UNSPECIFIED TRIGGER: ICD-10-CM

## 2019-05-20 PROBLEM — E66.3 OVERWEIGHT (BMI 25.0-29.9): Status: RESOLVED | Noted: 2019-02-13 | Resolved: 2019-05-20

## 2019-05-20 PROBLEM — R53.83 FATIGUE: Status: RESOLVED | Noted: 2019-01-23 | Resolved: 2019-05-20

## 2019-05-20 PROBLEM — M77.11 LATERAL EPICONDYLITIS OF RIGHT ELBOW: Status: RESOLVED | Noted: 2018-09-07 | Resolved: 2019-05-20

## 2019-05-20 PROBLEM — M25.521 ELBOW PAIN, RIGHT: Status: RESOLVED | Noted: 2018-07-02 | Resolved: 2019-05-20

## 2019-05-20 PROCEDURE — 99213 OFFICE O/P EST LOW 20 MIN: CPT | Performed by: PHYSICIAN ASSISTANT

## 2019-05-20 RX ORDER — PREDNISONE 10 MG/1
TABLET ORAL
Qty: 30 TABLET | Refills: 0 | Status: SHIPPED | OUTPATIENT
Start: 2019-05-20 | End: 2019-06-04 | Stop reason: ALTCHOICE

## 2019-05-28 ENCOUNTER — OFFICE VISIT (OUTPATIENT)
Dept: OBGYN CLINIC | Facility: CLINIC | Age: 44
End: 2019-05-28
Payer: OTHER GOVERNMENT

## 2019-05-28 ENCOUNTER — OFFICE VISIT (OUTPATIENT)
Dept: SURGERY | Facility: CLINIC | Age: 44
End: 2019-05-28

## 2019-05-28 VITALS
HEART RATE: 85 BPM | SYSTOLIC BLOOD PRESSURE: 120 MMHG | HEIGHT: 65 IN | BODY MASS INDEX: 23.16 KG/M2 | RESPIRATION RATE: 18 BRPM | WEIGHT: 139 LBS | DIASTOLIC BLOOD PRESSURE: 78 MMHG | TEMPERATURE: 98.1 F

## 2019-05-28 VITALS
WEIGHT: 143 LBS | SYSTOLIC BLOOD PRESSURE: 120 MMHG | DIASTOLIC BLOOD PRESSURE: 74 MMHG | HEIGHT: 65 IN | BODY MASS INDEX: 23.82 KG/M2

## 2019-05-28 DIAGNOSIS — Z90.721 STATUS POST RIGHT OOPHORECTOMY: ICD-10-CM

## 2019-05-28 DIAGNOSIS — N83.202 LEFT OVARIAN CYST: ICD-10-CM

## 2019-05-28 DIAGNOSIS — R10.9 ABDOMINAL PAIN, UNSPECIFIED ABDOMINAL LOCATION: ICD-10-CM

## 2019-05-28 DIAGNOSIS — Z90.711 STATUS POST LAPAROSCOPIC SUPRACERVICAL HYSTERECTOMY: Primary | ICD-10-CM

## 2019-05-28 DIAGNOSIS — R10.2 PELVIC PAIN IN FEMALE: ICD-10-CM

## 2019-05-28 DIAGNOSIS — Z90.49 STATUS POST LAPAROSCOPIC CHOLECYSTECTOMY: ICD-10-CM

## 2019-05-28 DIAGNOSIS — K81.9 CHOLECYSTITIS: Primary | ICD-10-CM

## 2019-05-28 PROCEDURE — 99024 POSTOP FOLLOW-UP VISIT: CPT | Performed by: PHYSICIAN ASSISTANT

## 2019-05-28 PROCEDURE — 99213 OFFICE O/P EST LOW 20 MIN: CPT | Performed by: OBSTETRICS & GYNECOLOGY

## 2019-06-03 ENCOUNTER — ANESTHESIA EVENT (OUTPATIENT)
Dept: GASTROENTEROLOGY | Facility: HOSPITAL | Age: 44
End: 2019-06-03

## 2019-06-04 ENCOUNTER — HOSPITAL ENCOUNTER (OUTPATIENT)
Dept: GASTROENTEROLOGY | Facility: HOSPITAL | Age: 44
Setting detail: OUTPATIENT SURGERY
Discharge: HOME | End: 2019-06-04
Attending: SURGERY | Admitting: SURGERY
Payer: OTHER GOVERNMENT

## 2019-06-04 ENCOUNTER — ANESTHESIA (OUTPATIENT)
Dept: GASTROENTEROLOGY | Facility: HOSPITAL | Age: 44
End: 2019-06-04

## 2019-06-04 VITALS
HEIGHT: 65 IN | HEART RATE: 88 BPM | DIASTOLIC BLOOD PRESSURE: 61 MMHG | SYSTOLIC BLOOD PRESSURE: 96 MMHG | RESPIRATION RATE: 20 BRPM | OXYGEN SATURATION: 100 % | WEIGHT: 136 LBS | BODY MASS INDEX: 22.66 KG/M2 | TEMPERATURE: 98.3 F

## 2019-06-04 DIAGNOSIS — R10.9 ABDOMINAL PAIN, UNSPECIFIED ABDOMINAL LOCATION: ICD-10-CM

## 2019-06-04 PROCEDURE — 88305 TISSUE EXAM BY PATHOLOGIST: CPT | Performed by: PATHOLOGY

## 2019-06-04 PROCEDURE — 45380 COLONOSCOPY AND BIOPSY: CPT | Performed by: SURGERY

## 2019-06-04 RX ORDER — PROPOFOL 10 MG/ML
INJECTION, EMULSION INTRAVENOUS CONTINUOUS PRN
Status: DISCONTINUED | OUTPATIENT
Start: 2019-06-04 | End: 2019-06-04 | Stop reason: SURG

## 2019-06-04 RX ORDER — LIDOCAINE HYDROCHLORIDE 20 MG/ML
INJECTION, SOLUTION EPIDURAL; INFILTRATION; INTRACAUDAL; PERINEURAL AS NEEDED
Status: DISCONTINUED | OUTPATIENT
Start: 2019-06-04 | End: 2019-06-04 | Stop reason: SURG

## 2019-06-04 RX ORDER — SODIUM CHLORIDE 9 MG/ML
125 INJECTION, SOLUTION INTRAVENOUS CONTINUOUS
Status: DISCONTINUED | OUTPATIENT
Start: 2019-06-04 | End: 2019-06-08 | Stop reason: HOSPADM

## 2019-06-04 RX ORDER — PROPOFOL 10 MG/ML
INJECTION, EMULSION INTRAVENOUS AS NEEDED
Status: DISCONTINUED | OUTPATIENT
Start: 2019-06-04 | End: 2019-06-04 | Stop reason: SURG

## 2019-06-04 RX ORDER — ONDANSETRON 2 MG/ML
4 INJECTION INTRAMUSCULAR; INTRAVENOUS ONCE
Status: COMPLETED | OUTPATIENT
Start: 2019-06-04 | End: 2019-06-04

## 2019-06-04 RX ADMIN — ONDANSETRON 4 MG: 2 INJECTION INTRAMUSCULAR; INTRAVENOUS at 10:45

## 2019-06-04 RX ADMIN — PROPOFOL 180 MCG/KG/MIN: 10 INJECTION, EMULSION INTRAVENOUS at 11:11

## 2019-06-04 RX ADMIN — SODIUM CHLORIDE: 0.9 INJECTION, SOLUTION INTRAVENOUS at 11:21

## 2019-06-04 RX ADMIN — LIDOCAINE HYDROCHLORIDE 3 ML: 20 INJECTION, SOLUTION EPIDURAL; INFILTRATION; INTRACAUDAL; PERINEURAL at 11:11

## 2019-06-04 RX ADMIN — SODIUM CHLORIDE 125 ML/HR: 0.9 INJECTION, SOLUTION INTRAVENOUS at 10:34

## 2019-06-04 RX ADMIN — PROPOFOL 100 MG: 10 INJECTION, EMULSION INTRAVENOUS at 11:11

## 2019-06-05 ENCOUNTER — TELEPHONE (OUTPATIENT)
Dept: SURGERY | Facility: CLINIC | Age: 44
End: 2019-06-05

## 2019-06-07 ENCOUNTER — TELEPHONE (OUTPATIENT)
Dept: FAMILY MEDICINE CLINIC | Facility: CLINIC | Age: 44
End: 2019-06-07

## 2019-10-08 ENCOUNTER — TELEPHONE (OUTPATIENT)
Dept: FAMILY MEDICINE CLINIC | Facility: CLINIC | Age: 44
End: 2019-10-08

## 2020-02-26 DIAGNOSIS — F32.A DEPRESSION, UNSPECIFIED DEPRESSION TYPE: ICD-10-CM

## 2020-02-26 RX ORDER — AMITRIPTYLINE HYDROCHLORIDE 25 MG/1
TABLET, FILM COATED ORAL
Qty: 30 TABLET | Refills: 0 | Status: SHIPPED | OUTPATIENT
Start: 2020-02-26 | End: 2020-03-04 | Stop reason: SDUPTHER

## 2020-02-26 NOTE — TELEPHONE ENCOUNTER
appt scheduled for 03/04/20 for refill on medications  Pt aware this is a one time courtesy refill since she is out of her medication

## 2020-03-04 ENCOUNTER — OFFICE VISIT (OUTPATIENT)
Dept: FAMILY MEDICINE CLINIC | Facility: CLINIC | Age: 45
End: 2020-03-04
Payer: OTHER GOVERNMENT

## 2020-03-04 VITALS
HEIGHT: 65 IN | DIASTOLIC BLOOD PRESSURE: 64 MMHG | HEART RATE: 92 BPM | WEIGHT: 155 LBS | BODY MASS INDEX: 25.83 KG/M2 | OXYGEN SATURATION: 98 % | TEMPERATURE: 97.9 F | SYSTOLIC BLOOD PRESSURE: 100 MMHG

## 2020-03-04 DIAGNOSIS — E55.9 VITAMIN D DEFICIENCY: ICD-10-CM

## 2020-03-04 DIAGNOSIS — F32.A DEPRESSION, UNSPECIFIED DEPRESSION TYPE: ICD-10-CM

## 2020-03-04 DIAGNOSIS — Z13.6 SCREENING FOR CARDIOVASCULAR CONDITION: ICD-10-CM

## 2020-03-04 DIAGNOSIS — Z13.1 SCREENING FOR DIABETES MELLITUS: ICD-10-CM

## 2020-03-04 DIAGNOSIS — Z00.00 ANNUAL PHYSICAL EXAM: Primary | ICD-10-CM

## 2020-03-04 DIAGNOSIS — Z13.29 SCREENING FOR THYROID DISORDER: ICD-10-CM

## 2020-03-04 DIAGNOSIS — T78.1XXS ADVERSE FOOD REACTION, SEQUELA: ICD-10-CM

## 2020-03-04 PROBLEM — T78.1XXA ADVERSE FOOD REACTION: Status: ACTIVE | Noted: 2020-03-04

## 2020-03-04 PROCEDURE — 99396 PREV VISIT EST AGE 40-64: CPT | Performed by: PHYSICIAN ASSISTANT

## 2020-03-04 RX ORDER — AMITRIPTYLINE HYDROCHLORIDE 25 MG/1
TABLET, FILM COATED ORAL
Qty: 30 TABLET | Refills: 5 | Status: SHIPPED | OUTPATIENT
Start: 2020-03-04 | End: 2020-11-04 | Stop reason: ALTCHOICE

## 2020-03-04 NOTE — PATIENT INSTRUCTIONS

## 2020-03-04 NOTE — PROGRESS NOTES
[facility] [address] [facility]    NAME: Duane Lacrosse Belanger  AGE: 53 y o  SEX: female  : 1975     DATE: 3/4/2020     Assessment and Plan:     Problem List Items Addressed This Visit        Other    Annual physical exam - Primary    Adverse food reaction    Relevant Orders    Ambulatory referral to Allergy    CBC and Platelet    Depression    Relevant Medications    amitriptyline (ELAVIL) 25 mg tablet    Vitamin D deficiency    Relevant Orders    Vitamin D 25 hydroxy      Other Visit Diagnoses     Screening for thyroid disorder        Relevant Orders    TSH, 3rd generation with Free T4 reflex    Screening for diabetes mellitus        Relevant Orders    Comprehensive metabolic panel    Screening for cardiovascular condition        Relevant Orders    Lipid panel       Patient declines HIV testing    Immunizations and preventive care screenings were discussed with patient today  Appropriate education was printed on patient's after visit summary  Counseling:  Dental Health: discussed importance of regular tooth brushing, flossing, and dental visits  Injury prevention: discussed safety/seat belts, safety helmets, smoke detectors, carbon dioxide detectors, and smoking near bedding or upholstery  · Exercise: the importance of regular exercise/physical activity was discussed  Recommend exercise 3-5 times per week for at least 30 minutes  BMI Counseling: Body mass index is 25 79 kg/m²  The BMI is above normal  Nutrition recommendations include decreasing portion sizes, encouraging healthy choices of fruits and vegetables, decreasing fast food intake, limiting drinks that contain sugar, moderation in carbohydrate intake, increasing intake of lean protein, reducing intake of saturated and trans fat and reducing intake of cholesterol   Exercise recommendations include moderate physical activity 150 minutes/week and strength training exercises  No pharmacotherapy was ordered  No follow-ups on file  Chief Complaint:     Chief Complaint   Patient presents with    Well Check     medication check, patient needs referral for PAP    Sore Throat     with swelling after eating, Patient took benedryl to resolve but tender to touch    Medication Refill    Physical Exam     patient is overdue for annual physical      History of Present Illness:     Adult Annual Physical   Patient here for a comprehensive physical exam  The patient reports With eating various in different foods patient is having and irritation and feeling of swelling in her throat       Diet and Physical Activity  · Diet/Nutrition: well balanced diet  · Exercise: moderate cardiovascular exercise and strength training exercises  Depression Screening  PHQ-9 Depression Screening    PHQ-9:    Frequency of the following problems over the past two weeks:       Little interest or pleasure in doing things:  0 - not at all  Feeling down, depressed, or hopeless:  0 - not at all  Trouble falling or staying asleep, or sleeping too much:  3 - nearly every day  Feeling tired or having little energy:  0 - not at all  Poor appetite or overeatin - several days  Feeling bad about yourself - or that you are a failure or have let yourself or your family down:  2 - more than half the days  Trouble concentrating on things, such as reading the newspaper or watching television:  0 - not at all  Moving or speaking so slowly that other people could have noticed  Or the opposite - being so fidgety or restless that you have been moving around a lot more than usual:  0 - not at all  Thoughts that you would be better off dead, or of hurting yourself in some way:  0 - not at all  PHQ-2 Score:  0  PHQ-9 Score:  6       General Health  · Sleep: sleeps poorly  · Hearing: normal - bilateral   · Vision: goes for regular eye exams and wears glasses     · Dental: no dental visits for >1 year        /GYN Health  · Patient is: Hysterectomy, Right oopherectomy     Review of Systems:     Review of Systems   Constitutional: Negative for activity change, appetite change, fatigue and fever  HENT: Negative for congestion, ear pain, facial swelling, mouth sores, postnasal drip, rhinorrhea, sinus pressure, sneezing and trouble swallowing  Patient will have a irritation in her throat and a swelling sensation when she eats various foods  She states it has never the same that she has been able to recognize  Eyes: Negative for pain, discharge and redness  Respiratory: Negative for cough, chest tightness and shortness of breath  Cardiovascular: Negative for chest pain, palpitations and leg swelling  Gastrointestinal: Negative for abdominal pain, constipation, diarrhea and nausea  Endocrine: Negative for cold intolerance and heat intolerance  Genitourinary: Negative for difficulty urinating, dysuria and flank pain  Musculoskeletal: Negative for arthralgias, back pain, gait problem, myalgias, neck pain and neck stiffness  Skin: Negative for rash  Allergic/Immunologic: Positive for food allergies  Neurological: Negative for dizziness, syncope, light-headedness, numbness and headaches  Hematological: Negative for adenopathy  Does not bruise/bleed easily  Psychiatric/Behavioral: Positive for sleep disturbance  Negative for decreased concentration, dysphoric mood, self-injury and suicidal ideas  The patient is not nervous/anxious         Past Medical History:     Past Medical History:   Diagnosis Date    Anxiety     Depression     Food allergy     scallops    Gall stone     lap ari today 5/8/2019    History of migraine     Motion sickness     PONV (postoperative nausea and vomiting)     Wears glasses       Past Surgical History:     Past Surgical History:   Procedure Laterality Date    BREAST CYST ASPIRATION Left     benign    COLONOSCOPY      ENDOMETRIAL ABLATION      HYSTERECTOMY      Partial    INJECTION ANESTHETIC AGENT TO CERVICAL PLEXUS      KNEE ARTHROSCOPY Left     ME LAP,CHOLECYSTECTOMY N/A 5/8/2019    Procedure: LAP JULES w/ IOC;  Surgeon: Mak Stern MD;  [facility];  Service: General    ME LAP,DIAGNOSTIC ABDOMEN N/A 9/16/2016    Procedure: LAPAROSCOPY DIAGNOSTIC;  Surgeon: Kelly Cohn DO;  [facility];  Service: Gynecology    ME LAP,DIAGNOSTIC ABDOMEN N/A 5/8/2019    Procedure: LAPAROSCOPY DIAGNOSTIC;  Surgeon: Mak Stern MD;  [facility];  Service: General    ME LAP,RMV  ADNEXAL STRUCTURE Right 9/16/2016    Procedure:  Lemmie Gibes;  Surgeon: Kelly Cohn DO;  [facility];  Service: Gynecology    REFRACTIVE SURGERY Bilateral     TONSILLECTOMY      WISDOM TOOTH EXTRACTION        Social History:        Social History     Socioeconomic History    Marital status: /Civil Union     Spouse name: None    Number of children: None    Years of education: None    Highest education level: None   Occupational History    None   Social Needs    Financial resource strain: None    Food insecurity:     Worry: None     Inability: None    Transportation needs:     Medical: None     Non-medical: None   Tobacco Use    Smoking status: Never Smoker    Smokeless tobacco: Never Used   Substance and Sexual Activity    Alcohol use:  Yes     Alcohol/week: 2 0 standard drinks     Types: 1 Glasses of wine, 1 Cans of beer per week     Comment: 2 weekly    Drug use: No    Sexual activity: None   Lifestyle    Physical activity:     Days per week: None     Minutes per session: None    Stress: None   Relationships    Social connections:     Talks on phone: None     Gets together: None     Attends Taoism service: None     Active member of club or organization: None     Attends meetings of clubs or organizations: None     Relationship status: None    Intimate partner violence:     Fear of current or ex partner: None     Emotionally abused: None     Physically abused: None     Forced sexual activity: None   Other Topics Concern    None   Social History Narrative    2 children    Caffeine use      Family History:     Family History   Adopted: Yes   Problem Relation Age of Onset    Alcohol abuse Father     Substance Abuse Father     Diabetes Sister     Cancer Maternal Grandmother     Alcohol abuse Maternal Grandfather     Heart disease Maternal Grandfather     Hypertension Maternal Grandfather     Cancer Maternal Aunt       Current Medications:     Current Outpatient Medications   Medication Sig Dispense Refill    amitriptyline (ELAVIL) 25 mg tablet 1 tablet q hs 30 tablet 5    cholecalciferol (VITAMIN D3) 1,000 units tablet Take 1 tablet (1,000 Units total) by mouth daily 30 tablet 3    Multiple Vitamin (MULTIVITAMIN) capsule Take 1 capsule by mouth daily       No current facility-administered medications for this visit  Allergies: Allergies   Allergen Reactions    Penicillins Hives    Codeine GI Intolerance    Shellfish-Derived Products Other (See Comments)     Intolerance to scallops -reaction severe vomitting    Tetracyclines & Related GI Intolerance    Ancef [Cefazolin] Tachycardia      Physical Exam:     /64   Pulse 92   Temp 97 9 °F (36 6 °C)   Ht 5' 5" (1 651 m)   Wt 70 3 kg (155 lb)   LMP  (LMP Unknown)   SpO2 98%   BMI 25 79 kg/m²     Physical Exam   Constitutional: She is oriented to person, place, and time  She appears well-developed and well-nourished  HENT:   Head: Normocephalic  Right Ear: Hearing, tympanic membrane, external ear and ear canal normal    Left Ear: Hearing, tympanic membrane, external ear and ear canal normal    Nose: Nose normal    Mouth/Throat: Uvula is midline, oropharynx is clear and moist and mucous membranes are normal  No oral lesions  No uvula swelling  No posterior oropharyngeal edema or posterior oropharyngeal erythema     Eyes: Pupils are equal, round, and reactive to light  Conjunctivae and EOM are normal    Neck: Normal range of motion  Carotid bruit is not present  No thyromegaly present  Cardiovascular: Normal rate, regular rhythm, normal heart sounds and intact distal pulses  Pulmonary/Chest: Effort normal and breath sounds normal  She has no wheezes  Abdominal: Soft  Bowel sounds are normal  She exhibits no mass  There is no hepatosplenomegaly  There is no tenderness  There is no CVA tenderness  Musculoskeletal: Normal range of motion  She exhibits no edema  Lymphadenopathy:     She has no cervical adenopathy  Neurological: She is alert and oriented to person, place, and time  She has normal reflexes  She exhibits normal muscle tone  Coordination normal    Skin: Skin is dry  No rash noted  Psychiatric: She has a normal mood and affect   Her behavior is normal  Judgment and thought content normal        Rakesh Serrano PA-C  [address] [place], [place] [address]leslie Salazar

## 2020-03-16 ENCOUNTER — APPOINTMENT (OUTPATIENT)
Dept: LAB | Facility: HOSPITAL | Age: 45
End: 2020-03-16
Payer: OTHER GOVERNMENT

## 2020-03-16 DIAGNOSIS — Z13.29 SCREENING FOR THYROID DISORDER: ICD-10-CM

## 2020-03-16 DIAGNOSIS — Z13.6 SCREENING FOR CARDIOVASCULAR CONDITION: ICD-10-CM

## 2020-03-16 DIAGNOSIS — Z13.1 SCREENING FOR DIABETES MELLITUS: ICD-10-CM

## 2020-03-16 DIAGNOSIS — T78.1XXS ADVERSE FOOD REACTION, SEQUELA: ICD-10-CM

## 2020-03-16 DIAGNOSIS — E55.9 VITAMIN D DEFICIENCY: ICD-10-CM

## 2020-03-16 LAB
25(OH)D3 SERPL-MCNC: 17 NG/ML (ref 30–100)
ALBUMIN SERPL BCP-MCNC: 3.9 G/DL (ref 3.5–5)
ALP SERPL-CCNC: 68 U/L (ref 46–116)
ALT SERPL W P-5'-P-CCNC: 23 U/L (ref 12–78)
ANION GAP SERPL CALCULATED.3IONS-SCNC: 7 MMOL/L (ref 4–13)
AST SERPL W P-5'-P-CCNC: 28 U/L (ref 5–45)
BILIRUB SERPL-MCNC: 1.2 MG/DL (ref 0.2–1)
BUN SERPL-MCNC: 11 MG/DL (ref 5–25)
CALCIUM SERPL-MCNC: 9 MG/DL (ref 8.3–10.1)
CHLORIDE SERPL-SCNC: 105 MMOL/L (ref 100–108)
CHOLEST SERPL-MCNC: 165 MG/DL (ref 50–200)
CO2 SERPL-SCNC: 28 MMOL/L (ref 21–32)
CREAT SERPL-MCNC: 0.8 MG/DL (ref 0.6–1.3)
ERYTHROCYTE [DISTWIDTH] IN BLOOD BY AUTOMATED COUNT: 13.2 % (ref 11.6–15.1)
GFR SERPL CREATININE-BSD FRML MDRD: 89 ML/MIN/1.73SQ M
GLUCOSE P FAST SERPL-MCNC: 86 MG/DL (ref 65–99)
HCT VFR BLD AUTO: 46.5 % (ref 34.8–46.1)
HDLC SERPL-MCNC: 82 MG/DL
HGB BLD-MCNC: 15.1 G/DL (ref 11.5–15.4)
LDLC SERPL CALC-MCNC: 74 MG/DL (ref 0–100)
MCH RBC QN AUTO: 29.5 PG (ref 26.8–34.3)
MCHC RBC AUTO-ENTMCNC: 32.5 G/DL (ref 31.4–37.4)
MCV RBC AUTO: 91 FL (ref 82–98)
NONHDLC SERPL-MCNC: 83 MG/DL
PLATELET # BLD AUTO: 235 THOUSANDS/UL (ref 149–390)
PMV BLD AUTO: 9.6 FL (ref 8.9–12.7)
POTASSIUM SERPL-SCNC: 4.1 MMOL/L (ref 3.5–5.3)
PROT SERPL-MCNC: 7.4 G/DL (ref 6.4–8.2)
RBC # BLD AUTO: 5.12 MILLION/UL (ref 3.81–5.12)
SODIUM SERPL-SCNC: 140 MMOL/L (ref 136–145)
TRIGL SERPL-MCNC: 44 MG/DL
TSH SERPL DL<=0.05 MIU/L-ACNC: 2.74 UIU/ML (ref 0.36–3.74)
WBC # BLD AUTO: 7.3 THOUSAND/UL (ref 4.31–10.16)

## 2020-03-16 PROCEDURE — 84443 ASSAY THYROID STIM HORMONE: CPT

## 2020-03-16 PROCEDURE — 82306 VITAMIN D 25 HYDROXY: CPT

## 2020-03-16 PROCEDURE — 85027 COMPLETE CBC AUTOMATED: CPT

## 2020-03-16 PROCEDURE — 36415 COLL VENOUS BLD VENIPUNCTURE: CPT

## 2020-03-16 PROCEDURE — 80053 COMPREHEN METABOLIC PANEL: CPT

## 2020-03-16 PROCEDURE — 80061 LIPID PANEL: CPT

## 2020-04-03 ENCOUNTER — TELEPHONE (OUTPATIENT)
Dept: OBGYN CLINIC | Facility: CLINIC | Age: 45
End: 2020-04-03

## 2020-07-01 ENCOUNTER — OFFICE VISIT (OUTPATIENT)
Dept: FAMILY MEDICINE CLINIC | Facility: CLINIC | Age: 45
End: 2020-07-01
Payer: OTHER GOVERNMENT

## 2020-07-01 VITALS
DIASTOLIC BLOOD PRESSURE: 70 MMHG | BODY MASS INDEX: 25.49 KG/M2 | OXYGEN SATURATION: 99 % | SYSTOLIC BLOOD PRESSURE: 100 MMHG | HEIGHT: 65 IN | WEIGHT: 153 LBS | HEART RATE: 77 BPM | TEMPERATURE: 99.4 F

## 2020-07-01 DIAGNOSIS — M62.9 MUSCLE DISORDER: Primary | ICD-10-CM

## 2020-07-01 PROCEDURE — 3008F BODY MASS INDEX DOCD: CPT | Performed by: PHYSICIAN ASSISTANT

## 2020-07-01 PROCEDURE — 99213 OFFICE O/P EST LOW 20 MIN: CPT | Performed by: PHYSICIAN ASSISTANT

## 2020-07-01 PROCEDURE — 1036F TOBACCO NON-USER: CPT | Performed by: PHYSICIAN ASSISTANT

## 2020-07-01 NOTE — PROGRESS NOTES
Assessment/Plan:          Diagnoses and all orders for this visit:    Muscle disorder  -     Ambulatory referral to Neurology; Future  -     Comprehensive metabolic panel; Future  -     CBC and differential; Future  -     TSH, 3rd generation with Free T4 reflex; Future        Patient is to decrease and stop her amitriptyline  She is to make sure she is hydrating herself  Subjective:      Patient ID: Rikki Simms is a 53 y o  female  Patient with a week-long history of abnormal sensation of needing to Coral her fingers for the past week  She states she has no problem opening and closing her hands  She does not have any numbness or tingling  She does not notice any weakness in her hands  She notices that her fingers just feel the need to curl and touch each other  She also notices that her toes will do this on occasion but not as bad as her fingers  She does not describe it as a cramping muscle spasm type pain  The following portions of the patient's history were reviewed and updated as appropriate:   She has a past medical history of Anxiety, Depression, Food allergy, Gall stone, History of migraine, Motion sickness, PONV (postoperative nausea and vomiting), and Wears glasses  ,  does not have any pertinent problems on file  ,   has a past surgical history that includes Tonsillectomy; Knee arthroscopy (Left); Refractive surgery (Bilateral); Endometrial ablation; Hysterectomy; Colonoscopy; Hart tooth extraction; pr lap,rmv  adnexal structure (Right, 9/16/2016); pr lap,diagnostic abdomen (N/A, 9/16/2016); Breast cyst aspiration (Left); Injection anesthetic agent to cervical plexus; pr lap,cholecystectomy (N/A, 5/8/2019); and pr lap,diagnostic abdomen (N/A, 5/8/2019)  ,  family history includes Alcohol abuse in her father and maternal grandfather; Cancer in her maternal aunt and maternal grandmother; Diabetes in her sister;  Heart disease in her maternal grandfather; Hypertension in her maternal grandfather; Substance Abuse in her father  She was adopted  ,   reports that she has never smoked  She has never used smokeless tobacco  She reports that she drinks about 2 0 standard drinks of alcohol per week  She reports that she does not use drugs  ,  is allergic to penicillins; codeine; shellfish-derived products; tetracyclines & related; and ancef [cefazolin]     Current Outpatient Medications   Medication Sig Dispense Refill    amitriptyline (ELAVIL) 25 mg tablet 1 tablet q hs 30 tablet 5    cholecalciferol (VITAMIN D3) 1,000 units tablet Take 1 tablet (1,000 Units total) by mouth daily 30 tablet 3    Multiple Vitamin (MULTIVITAMIN) capsule Take 1 capsule by mouth daily       No current facility-administered medications for this visit  Review of Systems   Constitutional: Positive for fatigue  Negative for activity change and appetite change  Respiratory: Negative for chest tightness and shortness of breath  Cardiovascular: Negative for chest pain and leg swelling  Gastrointestinal: Negative for constipation and diarrhea  Genitourinary: Negative for decreased urine volume and difficulty urinating  Musculoskeletal: Negative for arthralgias, joint swelling and myalgias  Skin: Negative for color change and rash  Neurological: Negative for dizziness, tremors, light-headedness, numbness and headaches  Psychiatric/Behavioral: Positive for sleep disturbance  The patient is nervous/anxious  Objective:  Vitals:    07/01/20 1547   BP: 100/70   Pulse: 77   Temp: 99 4 °F (37 4 °C)   SpO2: 99%   Weight: 69 4 kg (153 lb)   Height: 5' 5" (1 651 m)     Body mass index is 25 46 kg/m²  Physical Exam   Constitutional: She is oriented to person, place, and time  She appears well-developed and well-nourished  HENT:   Head: Normocephalic  Right Ear: External ear normal    Left Ear: External ear normal    Eyes: Conjunctivae are normal    Cardiovascular: Normal rate and regular rhythm  Pulmonary/Chest: Effort normal    Musculoskeletal: Normal range of motion  She exhibits no edema, tenderness or deformity  Neurological: She is alert and oriented to person, place, and time  She displays normal reflexes  She exhibits normal muscle tone  Coordination normal    Skin: Skin is warm and dry  Psychiatric: She has a normal mood and affect  Her behavior is normal    Nursing note and vitals reviewed

## 2020-07-02 ENCOUNTER — APPOINTMENT (OUTPATIENT)
Dept: LAB | Facility: HOSPITAL | Age: 45
End: 2020-07-02
Payer: OTHER GOVERNMENT

## 2020-07-02 ENCOUNTER — TELEPHONE (OUTPATIENT)
Dept: NEUROLOGY | Facility: CLINIC | Age: 45
End: 2020-07-02

## 2020-07-02 ENCOUNTER — TELEPHONE (OUTPATIENT)
Dept: FAMILY MEDICINE CLINIC | Facility: CLINIC | Age: 45
End: 2020-07-02

## 2020-07-02 DIAGNOSIS — M62.9 MUSCLE DISORDER: ICD-10-CM

## 2020-07-02 LAB
ALBUMIN SERPL BCP-MCNC: 3.6 G/DL (ref 3.5–5)
ALP SERPL-CCNC: 71 U/L (ref 46–116)
ALT SERPL W P-5'-P-CCNC: 23 U/L (ref 12–78)
ANION GAP SERPL CALCULATED.3IONS-SCNC: 7 MMOL/L (ref 4–13)
AST SERPL W P-5'-P-CCNC: 24 U/L (ref 5–45)
BASOPHILS # BLD AUTO: 0.03 THOUSANDS/ΜL (ref 0–0.1)
BASOPHILS NFR BLD AUTO: 1 % (ref 0–1)
BILIRUB SERPL-MCNC: 1.6 MG/DL (ref 0.2–1)
BUN SERPL-MCNC: 10 MG/DL (ref 5–25)
CALCIUM SERPL-MCNC: 8.4 MG/DL (ref 8.3–10.1)
CHLORIDE SERPL-SCNC: 107 MMOL/L (ref 100–108)
CO2 SERPL-SCNC: 28 MMOL/L (ref 21–32)
CREAT SERPL-MCNC: 0.67 MG/DL (ref 0.6–1.3)
EOSINOPHIL # BLD AUTO: 0.07 THOUSAND/ΜL (ref 0–0.61)
EOSINOPHIL NFR BLD AUTO: 1 % (ref 0–6)
ERYTHROCYTE [DISTWIDTH] IN BLOOD BY AUTOMATED COUNT: 13.1 % (ref 11.6–15.1)
GFR SERPL CREATININE-BSD FRML MDRD: 106 ML/MIN/1.73SQ M
GLUCOSE P FAST SERPL-MCNC: 88 MG/DL (ref 65–99)
HCT VFR BLD AUTO: 44.5 % (ref 34.8–46.1)
HGB BLD-MCNC: 14.3 G/DL (ref 11.5–15.4)
IMM GRANULOCYTES # BLD AUTO: 0.02 THOUSAND/UL (ref 0–0.2)
IMM GRANULOCYTES NFR BLD AUTO: 0 % (ref 0–2)
LYMPHOCYTES # BLD AUTO: 1.21 THOUSANDS/ΜL (ref 0.6–4.47)
LYMPHOCYTES NFR BLD AUTO: 23 % (ref 14–44)
MCH RBC QN AUTO: 29.1 PG (ref 26.8–34.3)
MCHC RBC AUTO-ENTMCNC: 32.1 G/DL (ref 31.4–37.4)
MCV RBC AUTO: 91 FL (ref 82–98)
MONOCYTES # BLD AUTO: 0.39 THOUSAND/ΜL (ref 0.17–1.22)
MONOCYTES NFR BLD AUTO: 7 % (ref 4–12)
NEUTROPHILS # BLD AUTO: 3.57 THOUSANDS/ΜL (ref 1.85–7.62)
NEUTS SEG NFR BLD AUTO: 68 % (ref 43–75)
NRBC BLD AUTO-RTO: 0 /100 WBCS
PLATELET # BLD AUTO: 217 THOUSANDS/UL (ref 149–390)
PMV BLD AUTO: 9.8 FL (ref 8.9–12.7)
POTASSIUM SERPL-SCNC: 4.2 MMOL/L (ref 3.5–5.3)
PROT SERPL-MCNC: 6.9 G/DL (ref 6.4–8.2)
RBC # BLD AUTO: 4.91 MILLION/UL (ref 3.81–5.12)
SODIUM SERPL-SCNC: 142 MMOL/L (ref 136–145)
TSH SERPL DL<=0.05 MIU/L-ACNC: 2.89 UIU/ML (ref 0.36–3.74)
WBC # BLD AUTO: 5.29 THOUSAND/UL (ref 4.31–10.16)

## 2020-07-02 PROCEDURE — 36415 COLL VENOUS BLD VENIPUNCTURE: CPT

## 2020-07-02 PROCEDURE — 80053 COMPREHEN METABOLIC PANEL: CPT

## 2020-07-02 PROCEDURE — 85025 COMPLETE CBC W/AUTO DIFF WBC: CPT

## 2020-07-02 PROCEDURE — 84443 ASSAY THYROID STIM HORMONE: CPT

## 2020-07-02 NOTE — TELEPHONE ENCOUNTER
Pt was seen by our [facility] on 7/1/2020 and received a referral to a Neurologist    Patient informed the front staff that we needed to a referral with her insurance  From the [employer], she may use [facility] located at [address] [place] [address], which in [facility]  T/c to patient: information given  She will call to make her own appt

## 2020-07-02 NOTE — TELEPHONE ENCOUNTER
Best contact number for CPTWYNP:[phone]    Emergency Contact name and number:  Zuleyka Estrada [phone]  Referring provider and telephone number:  [facility] [phone]  Primary Care Provider Name and if affiliated with Ivana [facility]:   Maureen Yes  Reason for Appointment/Dx:  Muscle Disorder  Neurology Location patient would like to be seen:  Any  Order received? Yes                                                 Records Received? Yes    Have you ever seen another Neurologist?       No    Insurance Kieran Hazel Avalos     ID/Policy #:[id number]    Secondary Insurance:    ID/Policy#: Workman's Comp/ Accident/ School  Information      Workman's Comp/Accident/School related?        No    If yes name of Insurance company:    Date of Injury:    Type of Injury:    [address] Name and Telephone Number:    Notes:                   Appointment date:   9/1/2020

## 2020-07-05 ENCOUNTER — HOSPITAL ENCOUNTER (EMERGENCY)
Facility: HOSPITAL | Age: 45
Discharge: HOME | End: 2020-07-05
Attending: EMERGENCY MEDICINE | Admitting: EMERGENCY MEDICINE
Payer: OTHER GOVERNMENT

## 2020-07-05 VITALS
RESPIRATION RATE: 16 BRPM | OXYGEN SATURATION: 99 % | SYSTOLIC BLOOD PRESSURE: 104 MMHG | TEMPERATURE: 97.6 F | HEART RATE: 80 BPM | DIASTOLIC BLOOD PRESSURE: 55 MMHG

## 2020-07-05 DIAGNOSIS — S61.012A LACERATION OF LEFT THUMB WITHOUT FOREIGN BODY WITHOUT DAMAGE TO NAIL, INITIAL ENCOUNTER: Primary | ICD-10-CM

## 2020-07-05 PROCEDURE — 90471 IMMUNIZATION ADMIN: CPT

## 2020-07-05 PROCEDURE — 99282 EMERGENCY DEPT VISIT SF MDM: CPT | Performed by: EMERGENCY MEDICINE

## 2020-07-05 PROCEDURE — 90715 TDAP VACCINE 7 YRS/> IM: CPT | Performed by: EMERGENCY MEDICINE

## 2020-07-05 PROCEDURE — 12001 RPR S/N/AX/GEN/TRNK 2.5CM/<: CPT | Performed by: EMERGENCY MEDICINE

## 2020-07-05 PROCEDURE — 99282 EMERGENCY DEPT VISIT SF MDM: CPT

## 2020-07-05 RX ADMIN — TETANUS TOXOID, REDUCED DIPHTHERIA TOXOID AND ACELLULAR PERTUSSIS VACCINE, ADSORBED 0.5 ML: 5; 2.5; 8; 8; 2.5 SUSPENSION INTRAMUSCULAR at 11:36

## 2020-07-05 NOTE — ED NOTES
Patient's wound cleansed and tourniquet tied per Dr Mortimer Radha verbal request      Faye Lima RN  07/05/20 4111

## 2020-07-05 NOTE — ED PROVIDER NOTES
Pt Name: Tata Batista  MRN: [id number]  [place] 1975  Age/Sex: 53 y o  female  Date of evaluation: 7/5/2020  PCP: José Miguel Pitt PA-C    CHIEF COMPLAINT    Chief Complaint   Patient presents with    Finger Laceration     to L thumb, w/ knife, denies tinners          HPI    53 y o  female presenting with laceration left thumb  Patient states that she was cutting a lemon with a knife when she slipped and cut the end of her thumb  She states she was unable to control the bleeding at home and so came to the ER  She denies any other pain or injuries  Patient not on blood thinners, cannot recall last tetanus shot        HPI      Past Medical and Surgical History    Past Medical History:   Diagnosis Date    Anxiety     Depression     Food allergy     scallops    Gall stone     lap jules today 5/8/2019    History of migraine     Motion sickness     PONV (postoperative nausea and vomiting)     Wears glasses        Past Surgical History:   Procedure Laterality Date    BREAST CYST ASPIRATION Left     benign    COLONOSCOPY      ENDOMETRIAL ABLATION      HYSTERECTOMY      Partial    INJECTION ANESTHETIC AGENT TO CERVICAL PLEXUS      KNEE ARTHROSCOPY Left     PA LAP,CHOLECYSTECTOMY N/A 5/8/2019    Procedure: LAP JULES w/ IOC;  Surgeon: Latosha Brantley MD;  [facility]: [facility];  Service: General    PA LAP,DIAGNOSTIC ABDOMEN N/A 9/16/2016    Procedure: LAPAROSCOPY DIAGNOSTIC;  Surgeon: Steffen Dumont DO;  [facility];  Service: Gynecology    PA LAP,DIAGNOSTIC ABDOMEN N/A 5/8/2019    Procedure: LAPAROSCOPY DIAGNOSTIC;  Surgeon: Latosha Brantley MD;  [facility];  Service: General    PA LAP,RMV  ADNEXAL STRUCTURE Right 9/16/2016    Procedure:  Aileen Goon;  Surgeon: Steffen Dumont DO;  [facility];  Service: Gynecology    REFRACTIVE SURGERY Bilateral     TONSILLECTOMY      WISDOM TOOTH EXTRACTION         Family History   Adopted: Yes   Problem Relation Age of Onset    Alcohol abuse Father     Substance Abuse Father     Diabetes Sister     Cancer Maternal Grandmother     Alcohol abuse Maternal Grandfather     Heart disease Maternal Grandfather     Hypertension Maternal Grandfather     Cancer Maternal Aunt        Social History     Tobacco Use    Smoking status: Never Smoker    Smokeless tobacco: Never Used   Substance Use Topics    Alcohol use: Yes     Alcohol/week: 2 0 standard drinks     Types: 1 Glasses of wine, 1 Cans of beer per week     Comment: 2 weekly    Drug use: No           Allergies    Allergies   Allergen Reactions    Penicillins Hives    Codeine GI Intolerance    Shellfish-Derived Products Other (See Comments)     Intolerance to scallops -reaction severe vomitting    Tetracyclines & Related GI Intolerance    Ancef [Cefazolin] Tachycardia       Home Medications    Prior to Admission medications    Medication Sig Start Date End Date Taking? Authorizing Provider   amitriptyline (ELAVIL) 25 mg tablet 1 tablet q hs 3/4/20   Clary Reddy PA-C   cholecalciferol (VITAMIN D3) 1,000 units tablet Take 1 tablet (1,000 Units total) by mouth daily 1/29/19   SUSI Mcallister   Multiple Vitamin (MULTIVITAMIN) capsule Take 1 capsule by mouth daily    Historical Provider, MD           Review of Systems    Review of Systems   Constitutional: Negative for activity change, chills and fever  HENT: Negative for drooling and facial swelling  Eyes: Negative for pain, discharge and visual disturbance  Respiratory: Negative for apnea, cough, chest tightness, shortness of breath and wheezing  Cardiovascular: Negative for chest pain and leg swelling  Gastrointestinal: Negative for abdominal pain, constipation, diarrhea, nausea and vomiting  Genitourinary: Negative for difficulty urinating, dysuria and urgency  Musculoskeletal: Negative for arthralgias, back pain and gait problem  Skin: Positive for wound   Negative for color change and rash    Neurological: Negative for dizziness, speech difficulty, weakness and headaches  Psychiatric/Behavioral: Negative for agitation, behavioral problems and confusion  All other systems reviewed and negative  Physical Exam      ED Triage Vitals [07/05/20 1031]   Temperature Pulse Respirations Blood Pressure SpO2   97 6 °F (36 4 °C) 80 16 104/55 99 %      Temp Source Heart Rate Source Patient Position - Orthostatic VS BP Location FiO2 (%)   Temporal Monitor Sitting Right arm --      Pain Score       --               Physical Exam   Constitutional: She is oriented to person, place, and time  She appears well-developed and well-nourished  HENT:   Head: Normocephalic and atraumatic  Eyes: Pupils are equal, round, and reactive to light  Conjunctivae and EOM are normal    Neck: Normal range of motion  Neck supple  Cardiovascular: Normal rate, regular rhythm, normal heart sounds and intact distal pulses  Pulmonary/Chest: Effort normal and breath sounds normal  No respiratory distress  She has no wheezes  She has no rales  Abdominal: Soft  She exhibits no distension  There is no tenderness  There is no rebound and no guarding  Musculoskeletal: Normal range of motion  She exhibits no edema or deformity  1 cm round laceration to the distal left thumb, slight bleeding noted, very superficial avulsion with approximately 30% of flaps still attached  Neurological: She is alert and oriented to person, place, and time  Skin: Skin is warm and dry  No rash noted  No erythema  Psychiatric: She has a normal mood and affect  Her behavior is normal  Judgment and thought content normal    Nursing note and vitals reviewed             Diagnostic Results      Labs:    Results Reviewed     None          All labs reviewed and utilized in the medical decision making process    Radiology:    No orders to display       All radiology studies independently viewed by me and interpreted by the radiologist     Procedure    Laceration repair  Date/Time: 7/5/2020 11:00 AM  Performed by: Yen Jarquin MD  Authorized by: Yen Jarquin MD   Consent: Verbal consent obtained  Risks and benefits: risks, benefits and alternatives were discussed  Consent given by: patient  Patient identity confirmed: provided demographic data  Body area: upper extremity  Location details: left thumb  Laceration length: 1 cm  Tendon involvement: none  Nerve involvement: none  Vascular damage: no    Sedation:  Patient sedated: no        Procedure Details:  Irrigation solution: tap water (With soap )  Amount of cleaning: standard  Debridement: none  Degree of undermining: none  Skin closure: glue  Dressing: 4x4 sterile gauze  Patient tolerance: Patient tolerated the procedure well with no immediate complications  Comments: Patient offered repair with sutures versus Dermabond, opted for the latter based on not desiring any needles  Repaired without incident or complication  ED Course of Care and Re-Assessments      Tetanus updated  Medications   tetanus-diphtheria-acellular pertussis (BOOSTRIX) IM injection 0 5 mL (0 5 mL Intramuscular Given 7/5/20 2116)           FINAL IMPRESSION    Final diagnoses:   Laceration of left thumb without foreign body without damage to nail, initial encounter         DISPOSITION/PLAN    Laceration left thumb as above  Vital signs examination otherwise reassuring, offered repair, Dermabonded with satisfactory repair after discussion of risks and benefits  Counseled regarding risks of infection as well as risk of dehiscence with Dermabond, also counseled regarding scar formation and wound healing  Hemodynamically stable and comfortable time of discharge    Time reflects when diagnosis was documented in both MDM as applicable and the Disposition within this note     Time User Action Codes Description Comment    7/5/2020 11:31 AM Brian Levin Add [E00 [id number]] Laceration of left thumb without foreign body without damage to nail, initial encounter       ED Disposition     ED Disposition Condition Date/Time Comment    Discharge Stable Sun Jul 5, 2020 11:31 AM Renetta Jacob discharge to home/self care  Follow-up Information     Follow up With Specialties Details Why Contact Info Additional [address] [facility] Emergency Department Emergency Medicine Go to  If symptoms worsen [address] [facility] [id number] [phone] [place] ED, [address], [address] [facility], EDIL Family Medicine, Physician Assistant Go to  As needed, For wound re-check [address] [address]  [phone]               PATIENT REFERRED TO:    [address] [facility] Emergency Department  [address] [facility] [id number] [phone]  Go to   If symptoms worsen    Trupti Rodriguez PA-C  [address] [address]  [phone]    Go to   As needed, For wound re-check      DISCHARGE MEDICATIONS:    Discharge Medication List as of 7/5/2020 11:31 AM      CONTINUE these medications which have NOT CHANGED    Details   amitriptyline (ELAVIL) 25 mg tablet 1 tablet q hs, Normal      cholecalciferol (VITAMIN D3) 1,000 units tablet Take 1 tablet (1,000 Units total) by mouth daily, Starting Tue 1/29/2019, No Print      Multiple Vitamin (MULTIVITAMIN) capsule Take 1 capsule by mouth daily, Historical Med             No discharge procedures on file           MD Arcenio Layne MD  07/05/20 2636

## 2020-07-28 ENCOUNTER — ANNUAL EXAM (OUTPATIENT)
Dept: OBGYN CLINIC | Facility: CLINIC | Age: 45
End: 2020-07-28
Payer: OTHER GOVERNMENT

## 2020-07-28 VITALS
SYSTOLIC BLOOD PRESSURE: 100 MMHG | BODY MASS INDEX: 25.33 KG/M2 | TEMPERATURE: 97.7 F | HEIGHT: 65 IN | WEIGHT: 152 LBS | DIASTOLIC BLOOD PRESSURE: 60 MMHG

## 2020-07-28 DIAGNOSIS — Z90.721 STATUS POST RIGHT OOPHORECTOMY: ICD-10-CM

## 2020-07-28 DIAGNOSIS — Z90.711 STATUS POST LAPAROSCOPIC SUPRACERVICAL HYSTERECTOMY: ICD-10-CM

## 2020-07-28 DIAGNOSIS — Z90.49 STATUS POST LAPAROSCOPIC CHOLECYSTECTOMY: ICD-10-CM

## 2020-07-28 DIAGNOSIS — Z01.419 WOMEN'S ANNUAL ROUTINE GYNECOLOGICAL EXAMINATION: Primary | ICD-10-CM

## 2020-07-28 DIAGNOSIS — Z87.42 HISTORY OF OVARIAN CYST: ICD-10-CM

## 2020-07-28 DIAGNOSIS — R92.2 DENSE BREAST TISSUE ON MAMMOGRAM: ICD-10-CM

## 2020-07-28 PROCEDURE — G0145 SCR C/V CYTO,THINLAYER,RESCR: HCPCS | Performed by: OBSTETRICS & GYNECOLOGY

## 2020-07-28 PROCEDURE — 87624 HPV HI-RISK TYP POOLED RSLT: CPT | Performed by: OBSTETRICS & GYNECOLOGY

## 2020-07-28 PROCEDURE — 99386 PREV VISIT NEW AGE 40-64: CPT | Performed by: OBSTETRICS & GYNECOLOGY

## 2020-07-29 NOTE — PROGRESS NOTES
Assessment   Annual well-woman exam  History of laparoscopic supracervical hysterectomy with right oophorectomy in 2014  Status post laparoscopic cholecystectomy  History of left ovarian cyst        Plan   Screening mammography ordered   All questions answered  Await pap smear results  Subjective here for annual exam     Ming Tay is a 53 y o  female who presents for annual exam  Periods are absent status post laparoscopic supracervical hysterectomy many years ago  Patient with history of pelvic pain and pelvic endometriosis  Doing well no further pelvic pain symptoms denies vaginal dryness or hot flashes or night sweats  No other new gyn complaints or concerns  The patient reports that there is not domestic violence in her life  Current contraception: status post hysterectomy  History of abnormal Pap smear: no  Family history of uterine or ovarian cancer: no  Regular self breast exam: yes  History of abnormal mammogram: no  Family history of breast cancer: no  History of abnormal lipids: no  Menstrual History:  OB History        2    Para   2    Term   2            AB        Living           SAB        TAB        Ectopic        Multiple        Live Births                    Menarche age: 13  No LMP recorded (lmp unknown)  Patient has had a hysterectomy  Review of Systems  Pertinent items are noted in HPI        Objective no acute distress   /60 (BP Location: Right arm, Patient Position: Sitting, Cuff Size: Standard)   Temp 97 7 °F (36 5 °C)   Ht 5' 5" (1 651 m)   Wt 68 9 kg (152 lb)   LMP  (LMP Unknown)   BMI 25 29 kg/m²     General:   alert and oriented, in no acute distress, alert, appears stated age and cooperative   Heart: regular rate and rhythm, S1, S2 normal, no murmur, click, rub or gallop   Lungs: clear to auscultation bilaterally   Abdomen: soft, non-tender, without masses or organomegaly   Vulva: normal, Bartholin's, Urethra, [place]'s normal, female escutcheon   Vagina: normal mucosa, normal discharge   Cervix: multiparous appearance, no bleeding following Pap, no cervical motion tenderness and no lesions   Uterus: surgically absent   Adnexa: normal adnexa and no mass, fullness, tenderness   Bilateral breast exam in the sitting and supine position with chaperone present, no visible or palpable breast lesions identified  No breast masses noted  No supraclavicular or axillary lymphadenopathy noted  No nipple discharge  Reviewed self-breast exam techniques  Rectal exam, no rectal masses, normal tone, no hemorrhoids visible or palpable  Hemoccult negative

## 2020-08-04 LAB
LAB AP GYN PRIMARY INTERPRETATION: NORMAL
Lab: NORMAL

## 2020-09-01 ENCOUNTER — CONSULT (OUTPATIENT)
Dept: NEUROLOGY | Facility: CLINIC | Age: 45
End: 2020-09-01
Payer: OTHER GOVERNMENT

## 2020-09-01 VITALS
SYSTOLIC BLOOD PRESSURE: 110 MMHG | WEIGHT: 152.8 LBS | DIASTOLIC BLOOD PRESSURE: 58 MMHG | TEMPERATURE: 97.7 F | HEART RATE: 82 BPM | BODY MASS INDEX: 25.43 KG/M2

## 2020-09-01 DIAGNOSIS — G43.009 MIGRAINE WITHOUT AURA AND WITHOUT STATUS MIGRAINOSUS, NOT INTRACTABLE: ICD-10-CM

## 2020-09-01 DIAGNOSIS — R25.3 MUSCLE TWITCHING: Primary | ICD-10-CM

## 2020-09-01 DIAGNOSIS — G25.71 AKATHISIA: ICD-10-CM

## 2020-09-01 DIAGNOSIS — M62.9 MUSCLE DISORDER: ICD-10-CM

## 2020-09-01 PROCEDURE — 99244 OFF/OP CNSLTJ NEW/EST MOD 40: CPT | Performed by: PSYCHIATRY & NEUROLOGY

## 2020-09-01 RX ORDER — RIZATRIPTAN BENZOATE 10 MG/1
TABLET, ORALLY DISINTEGRATING ORAL
Qty: 12 TABLET | Refills: 3 | Status: SHIPPED | OUTPATIENT
Start: 2020-09-01 | End: 2021-08-17

## 2020-09-01 NOTE — ASSESSMENT & PLAN NOTE
Assessment:  · Jameson Resendiz is a 53 y o  female with past medical history significant for  who presents to the clinic for assessment of recent onset of abnormal finger movements, and muscle twitching  Patient reports that this began 3 months ago she denies any precipitating factor  Patient had a non-focal neuro exam   Plan:  · Will plan for EMG upper/lower limb to rule out any nerve or muscle disease  · Will obtain a routine EEG, to rule out any underlying seizure disorder     · Patient verbalized understanding

## 2020-09-01 NOTE — ASSESSMENT & PLAN NOTE
· Involuntary movements in her bilateral fingers, and bilateral toes  · Patient denies any recent change in medications or new addition of medications    · Will obtain EMG  · Will obtain EEG

## 2020-10-01 ENCOUNTER — HOSPITAL ENCOUNTER (OUTPATIENT)
Dept: NEUROLOGY | Facility: HOSPITAL | Age: 45
Discharge: HOME | End: 2020-10-01
Attending: PSYCHIATRY & NEUROLOGY
Payer: OTHER GOVERNMENT

## 2020-10-01 DIAGNOSIS — R25.3 MUSCLE TWITCHING: ICD-10-CM

## 2020-10-01 PROCEDURE — 95816 EEG AWAKE AND DROWSY: CPT

## 2020-10-01 PROCEDURE — 95819 EEG AWAKE AND ASLEEP: CPT | Performed by: PSYCHIATRY & NEUROLOGY

## 2020-10-07 ENCOUNTER — TELEPHONE (OUTPATIENT)
Dept: NEUROLOGY | Facility: CLINIC | Age: 45
End: 2020-10-07

## 2020-10-28 ENCOUNTER — PROCEDURE VISIT (OUTPATIENT)
Dept: NEUROLOGY | Facility: CLINIC | Age: 45
End: 2020-10-28
Payer: OTHER GOVERNMENT

## 2020-10-28 DIAGNOSIS — R25.3 MUSCLE TWITCHING: ICD-10-CM

## 2020-10-28 PROCEDURE — 95886 MUSC TEST DONE W/N TEST COMP: CPT | Performed by: PHYSICAL MEDICINE & REHABILITATION

## 2020-10-28 PROCEDURE — 95912 NRV CNDJ TEST 11-12 STUDIES: CPT | Performed by: PHYSICAL MEDICINE & REHABILITATION

## 2020-11-04 ENCOUNTER — TELEPHONE (OUTPATIENT)
Dept: NEUROLOGY | Facility: CLINIC | Age: 45
End: 2020-11-04

## 2020-11-04 ENCOUNTER — OFFICE VISIT (OUTPATIENT)
Dept: FAMILY MEDICINE CLINIC | Facility: CLINIC | Age: 45
End: 2020-11-04
Payer: OTHER GOVERNMENT

## 2020-11-04 VITALS
TEMPERATURE: 98.4 F | DIASTOLIC BLOOD PRESSURE: 70 MMHG | SYSTOLIC BLOOD PRESSURE: 122 MMHG | OXYGEN SATURATION: 99 % | HEART RATE: 76 BPM | BODY MASS INDEX: 25.66 KG/M2 | HEIGHT: 65 IN | WEIGHT: 154 LBS

## 2020-11-04 DIAGNOSIS — J01.10 ACUTE FRONTAL SINUSITIS, RECURRENCE NOT SPECIFIED: Primary | ICD-10-CM

## 2020-11-04 PROCEDURE — 99213 OFFICE O/P EST LOW 20 MIN: CPT | Performed by: PHYSICIAN ASSISTANT

## 2020-11-04 RX ORDER — BENZONATATE 200 MG/1
200 CAPSULE ORAL 3 TIMES DAILY PRN
Qty: 20 CAPSULE | Refills: 0 | Status: SHIPPED | OUTPATIENT
Start: 2020-11-04 | End: 2020-12-08 | Stop reason: ALTCHOICE

## 2020-11-04 RX ORDER — AZITHROMYCIN 250 MG/1
TABLET, FILM COATED ORAL
Qty: 6 TABLET | Refills: 0 | Status: SHIPPED | OUTPATIENT
Start: 2020-11-04 | End: 2020-11-08

## 2020-11-30 ENCOUNTER — OFFICE VISIT (OUTPATIENT)
Dept: OBGYN CLINIC | Facility: CLINIC | Age: 45
End: 2020-11-30
Payer: OTHER GOVERNMENT

## 2020-11-30 VITALS
BODY MASS INDEX: 26.46 KG/M2 | HEIGHT: 65 IN | SYSTOLIC BLOOD PRESSURE: 120 MMHG | DIASTOLIC BLOOD PRESSURE: 64 MMHG | WEIGHT: 158.8 LBS

## 2020-11-30 DIAGNOSIS — Z90.711 STATUS POST LAPAROSCOPIC SUPRACERVICAL HYSTERECTOMY: ICD-10-CM

## 2020-11-30 DIAGNOSIS — E55.9 VITAMIN D DEFICIENCY: ICD-10-CM

## 2020-11-30 DIAGNOSIS — N95.1 MENOPAUSAL SYNDROME (HOT FLASHES): Primary | ICD-10-CM

## 2020-11-30 DIAGNOSIS — Z90.79 HISTORY OF RIGHT SALPINGO-OOPHORECTOMY: ICD-10-CM

## 2020-11-30 DIAGNOSIS — Z86.69 HISTORY OF MIGRAINE HEADACHES: ICD-10-CM

## 2020-11-30 DIAGNOSIS — Z87.42 HISTORY OF OVARIAN CYST: ICD-10-CM

## 2020-11-30 DIAGNOSIS — Z90.721 HISTORY OF RIGHT SALPINGO-OOPHORECTOMY: ICD-10-CM

## 2020-11-30 PROCEDURE — 99213 OFFICE O/P EST LOW 20 MIN: CPT | Performed by: OBSTETRICS & GYNECOLOGY

## 2020-11-30 RX ORDER — ACETAMINOPHEN AND CODEINE PHOSPHATE 120; 12 MG/5ML; MG/5ML
1 SOLUTION ORAL DAILY
Qty: 30 TABLET | Refills: 3 | Status: SHIPPED | OUTPATIENT
Start: 2020-11-30 | End: 2021-08-02 | Stop reason: SDUPTHER

## 2020-12-07 ENCOUNTER — LAB (OUTPATIENT)
Dept: LAB | Facility: CLINIC | Age: 45
End: 2020-12-07
Payer: OTHER GOVERNMENT

## 2020-12-07 DIAGNOSIS — E55.9 VITAMIN D DEFICIENCY: ICD-10-CM

## 2020-12-07 DIAGNOSIS — N95.1 MENOPAUSAL SYNDROME (HOT FLASHES): ICD-10-CM

## 2020-12-07 LAB
25(OH)D3 SERPL-MCNC: 24.2 NG/ML (ref 30–100)
ALBUMIN SERPL BCP-MCNC: 4 G/DL (ref 3.5–5)
ALP SERPL-CCNC: 72 U/L (ref 46–116)
ALT SERPL W P-5'-P-CCNC: 25 U/L (ref 12–78)
ANION GAP SERPL CALCULATED.3IONS-SCNC: 7 MMOL/L (ref 4–13)
AST SERPL W P-5'-P-CCNC: 16 U/L (ref 5–45)
BILIRUB SERPL-MCNC: 1.45 MG/DL (ref 0.2–1)
BUN SERPL-MCNC: 8 MG/DL (ref 5–25)
CALCIUM SERPL-MCNC: 8.9 MG/DL (ref 8.3–10.1)
CHLORIDE SERPL-SCNC: 109 MMOL/L (ref 100–108)
CO2 SERPL-SCNC: 25 MMOL/L (ref 21–32)
CREAT SERPL-MCNC: 0.73 MG/DL (ref 0.6–1.3)
ERYTHROCYTE [DISTWIDTH] IN BLOOD BY AUTOMATED COUNT: 13.1 % (ref 11.6–15.1)
GFR SERPL CREATININE-BSD FRML MDRD: 98 ML/MIN/1.73SQ M
GLUCOSE P FAST SERPL-MCNC: 86 MG/DL (ref 65–99)
HCT VFR BLD AUTO: 48.6 % (ref 34.8–46.1)
HGB BLD-MCNC: 15.4 G/DL (ref 11.5–15.4)
MCH RBC QN AUTO: 28.8 PG (ref 26.8–34.3)
MCHC RBC AUTO-ENTMCNC: 31.7 G/DL (ref 31.4–37.4)
MCV RBC AUTO: 91 FL (ref 82–98)
PLATELET # BLD AUTO: 259 THOUSANDS/UL (ref 149–390)
PMV BLD AUTO: 10.1 FL (ref 8.9–12.7)
POTASSIUM SERPL-SCNC: 4 MMOL/L (ref 3.5–5.3)
PROT SERPL-MCNC: 7.6 G/DL (ref 6.4–8.2)
RBC # BLD AUTO: 5.35 MILLION/UL (ref 3.81–5.12)
SODIUM SERPL-SCNC: 141 MMOL/L (ref 136–145)
TSH SERPL DL<=0.05 MIU/L-ACNC: 2 UIU/ML (ref 0.36–3.74)
WBC # BLD AUTO: 7.08 THOUSAND/UL (ref 4.31–10.16)

## 2020-12-07 PROCEDURE — 85027 COMPLETE CBC AUTOMATED: CPT

## 2020-12-07 PROCEDURE — 36415 COLL VENOUS BLD VENIPUNCTURE: CPT

## 2020-12-07 PROCEDURE — 82306 VITAMIN D 25 HYDROXY: CPT

## 2020-12-07 PROCEDURE — 84443 ASSAY THYROID STIM HORMONE: CPT

## 2020-12-07 PROCEDURE — 80053 COMPREHEN METABOLIC PANEL: CPT

## 2020-12-08 ENCOUNTER — OFFICE VISIT (OUTPATIENT)
Dept: NEUROLOGY | Facility: CLINIC | Age: 45
End: 2020-12-08
Payer: OTHER GOVERNMENT

## 2020-12-08 VITALS
BODY MASS INDEX: 25.86 KG/M2 | DIASTOLIC BLOOD PRESSURE: 58 MMHG | SYSTOLIC BLOOD PRESSURE: 116 MMHG | HEART RATE: 74 BPM | WEIGHT: 155.4 LBS

## 2020-12-08 DIAGNOSIS — G25.71 AKATHISIA: Primary | ICD-10-CM

## 2020-12-08 PROCEDURE — 99213 OFFICE O/P EST LOW 20 MIN: CPT | Performed by: PSYCHIATRY & NEUROLOGY

## 2021-03-01 ENCOUNTER — TELEPHONE (OUTPATIENT)
Dept: NEUROLOGY | Facility: CLINIC | Age: 46
End: 2021-03-01

## 2021-03-01 DIAGNOSIS — R25.2 MUSCLE CRAMPS: Primary | ICD-10-CM

## 2021-03-01 NOTE — TELEPHONE ENCOUNTER
pt called and states that saturday about 3:30am, she had pain in r forearm and fingers curled in a fist and r arm was spasming  force her fist open with other hand and hold it open by sitting it on the bed   pain is now gone  lasted about 15-20 mins  fingers are still a little curled but this is her typical  this is the first time it occured in the middle of the night and first time that she had the spasm and curled into a fist     she has been using tonic water everynight  no further toe curling     any recommendations  [phone] to leave detailed message

## 2021-03-02 NOTE — TELEPHONE ENCOUNTER
Dr Mott Home, can you reach out to patient? Have we done an EMG on her, can consider if not done already    Thanks

## 2021-03-03 ENCOUNTER — TELEPHONE (OUTPATIENT)
Dept: OTHER | Facility: HOSPITAL | Age: 46
End: 2021-03-03

## 2021-03-03 NOTE — TELEPHONE ENCOUNTER
I tired calling the patient again today twice, still going to voicemail left detailed voicemail  On last visit we spoke out considering adding Klonopin as a nightly medication if the symptoms are continuing to be bothersome devon at night

## 2021-03-03 NOTE — TELEPHONE ENCOUNTER
Pt returning Dr Kev Phelan call from last night  She was having an issue with her phone  She will answer today  [phone]- ok to leave detailed msg

## 2021-03-04 DIAGNOSIS — G25.71 AKATHISIA: Primary | ICD-10-CM

## 2021-03-04 RX ORDER — CLONAZEPAM 0.5 MG/1
0.5 TABLET ORAL
Qty: 30 TABLET | Refills: 0 | Status: SHIPPED | OUTPATIENT
Start: 2021-03-04 | End: 2021-08-02 | Stop reason: ALTCHOICE

## 2021-03-04 NOTE — TELEPHONE ENCOUNTER
Enrique Conrad,     Thanks for calling the patient, I agree she had a very normal exam, and the symptoms now almost sound like a cramp  It has been going on for a while now, guess would be ok to do a brain MRI for sake of completion  Agree with adding low dose klonopin at night as well       Thanks

## 2021-03-04 NOTE — TELEPHONE ENCOUNTER
Patient called and informed of MRI, transferred to  to schedule  Advised of edgar Goncalves, please order for patient to pharmacy on file   TY

## 2021-03-10 ENCOUNTER — OFFICE VISIT (OUTPATIENT)
Dept: OBGYN CLINIC | Facility: CLINIC | Age: 46
End: 2021-03-10
Payer: OTHER GOVERNMENT

## 2021-03-10 VITALS
HEIGHT: 65 IN | DIASTOLIC BLOOD PRESSURE: 60 MMHG | WEIGHT: 156.8 LBS | SYSTOLIC BLOOD PRESSURE: 104 MMHG | BODY MASS INDEX: 26.12 KG/M2

## 2021-03-10 DIAGNOSIS — Z90.711 STATUS POST LAPAROSCOPIC SUPRACERVICAL HYSTERECTOMY: ICD-10-CM

## 2021-03-10 DIAGNOSIS — E55.9 VITAMIN D DEFICIENCY: ICD-10-CM

## 2021-03-10 DIAGNOSIS — N95.1 MENOPAUSAL SYNDROME: Primary | ICD-10-CM

## 2021-03-10 PROCEDURE — 99213 OFFICE O/P EST LOW 20 MIN: CPT | Performed by: OBSTETRICS & GYNECOLOGY

## 2021-03-10 RX ORDER — EPINEPHRINE 0.3 MG/.3ML
INJECTION SUBCUTANEOUS
COMMUNITY
Start: 2020-12-28

## 2021-03-10 NOTE — PROGRESS NOTES
Assessment/Plan:    Diagnoses and all orders for this visit:    Menopausal syndrome    Status post laparoscopic supracervical hysterectomy    Vitamin D deficiency    Other orders  -     EPINEPHrine (EPIPEN) 0 3 mg/0 3 mL SOAJ; inject 0 3 milliliters intramuscularly immediately for ALLERGIC REACTION        Subjective: here for follow-up     Patient ID: Elba Harris is a 50 y o  female  HPI    47-year-old female  2 para 2 status post laparoscopic supracervical hysterectomy in  for recurrent abnormal uterine bleeding symptoms and pelvic pain  Pelvic pain symptoms have since subsided  Patient was seen for her annual exam in 2020  Started noticing recurrent increasing symptoms of hot flashes and night sweats  Ultimately she was started on norethindrone 0 35 mg daily  They have since subsided  Patient was also found to be vitamin-D deficient has been treated for that  Is currently undergoing evaluation for spasms in her hands  Currently she was being treated with an antidepressant has not noticed any significant improvement with this she is seeking 2nd opinion next month  Recommend she continue her norethindrone for now  Follow up in July for annual exam   Consider adding magnesium to her diet low with additional calcium  Review of Systems   Respiratory: Negative  Cardiovascular: Negative  Gastrointestinal: Negative  Genitourinary: Negative  Neurological: Negative  Psychiatric/Behavioral: Negative  All other systems reviewed and are negative  Objective:   No acute distress  /60 (BP Location: Left arm, Patient Position: Sitting, Cuff Size: Standard)   Ht 5' 5" (1 651 m)   Wt 71 1 kg (156 lb 12 8 oz)   LMP  (LMP Unknown)   BMI 26 09 kg/m²      Physical Exam  Vitals signs reviewed  Constitutional:       Appearance: She is normal weight  Eyes:      Pupils: Pupils are equal, round, and reactive to light     Pulmonary:      Effort: Pulmonary effort is normal    Genitourinary:     Comments:  Pelvic exam deferred  Neurological:      Mental Status: She is alert and oriented to person, place, and time  Psychiatric:         Mood and Affect: Mood normal          Behavior: Behavior normal          Thought Content: Thought content normal          Judgment: Judgment normal           Please note    In addition to the time spent discussing the findings and results of today's visit and exam, I spent approximately 20  minutes of face-to-face time with the patient, greater than 50% of which was spent in counseling and coordination of care for this patient

## 2021-03-17 ENCOUNTER — HOSPITAL ENCOUNTER (OUTPATIENT)
Dept: MRI IMAGING | Facility: HOSPITAL | Age: 46
Discharge: HOME | End: 2021-03-17
Payer: OTHER GOVERNMENT

## 2021-03-17 DIAGNOSIS — G25.71 AKATHISIA: ICD-10-CM

## 2021-03-17 PROCEDURE — A9585 GADOBUTROL INJECTION: HCPCS | Performed by: PSYCHIATRY & NEUROLOGY

## 2021-03-17 PROCEDURE — G1004 CDSM NDSC: HCPCS

## 2021-03-17 PROCEDURE — 70553 MRI BRAIN STEM W/O & W/DYE: CPT

## 2021-03-17 RX ADMIN — GADOBUTROL 7 ML: 604.72 INJECTION INTRAVENOUS at 10:01

## 2021-03-22 ENCOUNTER — TELEPHONE (OUTPATIENT)
Dept: NEUROLOGY | Facility: CLINIC | Age: 46
End: 2021-03-22

## 2021-03-22 NOTE — TELEPHONE ENCOUNTER
Call received from [facility]  Patient MRI brain with significant findings  Please advise  /Toya demarco

## 2021-03-22 NOTE — TELEPHONE ENCOUNTER
Patient called regarding her MRI results  Reviewed Dr Varun Dupont result notes with her  Patient states that her akathisia symptoms are happening more frequently and there is more intensity to the symptoms, causing her hands to curl into fists  She states that it is not painful unless it lasts for a long time, such as a few minutes, then it can be painful  Patient states that she also has developed intermittent tremoring in her hands, more significant in her right than left  Dr Nitesh Kapadia, please call patient back to discuss   TY      # [phone]

## 2021-03-22 NOTE — TELEPHONE ENCOUNTER
Images reviewed numerous small white matter hyperintensities , not in the classic distribution for MS  If I recall, she does have h/o migraines, I would recommend we repeat the MRI in 6 months to ensure stability  Dr Laura Salazar, can you please call the patient to discuss the MRI findings       Thanks

## 2021-03-23 DIAGNOSIS — R25.3 MUSCLE TWITCHING: Primary | ICD-10-CM

## 2021-03-23 DIAGNOSIS — G25.71 AKATHISIA: ICD-10-CM

## 2021-03-23 NOTE — TELEPHONE ENCOUNTER
Spoke to the patient regarding her MRI results and told her that we will repeat an MRI in 6 months  She states clonopin is not very been helpful for her symptoms  She continues to have her fingers "curling up into a fist" and now reports associated bilateral upper extremity tremors  The finger rubbing is no longer present, but she has daily finger curling into a fist     Headaches are overall well controlled, she continues to take  Maxalt 2-3 times a week for migraine management  Occasionally will require it more frequent, she was advised to keep Maxalt to max of 2-3 times a week and that if this is not controlling her symptoms to call and we can address  She currently has an appointment in August would like to be fit in earlier is possible  All questions addressed and patient verbalized understanding

## 2021-03-26 DIAGNOSIS — G25.71 AKATHISIA: Primary | ICD-10-CM

## 2021-03-26 RX ORDER — PROPRANOLOL HYDROCHLORIDE 20 MG/1
TABLET ORAL
Qty: 30 TABLET | Refills: 1 | Status: SHIPPED | OUTPATIENT
Start: 2021-03-26 | End: 2021-08-02 | Stop reason: ALTCHOICE

## 2021-03-26 NOTE — TELEPHONE ENCOUNTER
Please let the patient know I called in a prescription for Propanolol 20 mg to her pharmacy with instructions provided for her on-going tremors  She can start taking this at night  She currently has an appointment in August, if possible to put her on a cancellation list to get in sooner   Thank you

## 2021-03-26 NOTE — TELEPHONE ENCOUNTER
Called patient and provided with medication information  Provided information on potential side effects and advised patient to call office if she has any issues/concerns  She verbalized understanding   Patient placed on cancellation/waitlist

## 2021-04-08 DIAGNOSIS — Z23 ENCOUNTER FOR IMMUNIZATION: ICD-10-CM

## 2021-04-09 ENCOUNTER — TELEMEDICINE (OUTPATIENT)
Dept: FAMILY MEDICINE CLINIC | Facility: CLINIC | Age: 46
End: 2021-04-09
Payer: OTHER GOVERNMENT

## 2021-04-09 ENCOUNTER — TELEPHONE (OUTPATIENT)
Dept: FAMILY MEDICINE CLINIC | Facility: CLINIC | Age: 46
End: 2021-04-09

## 2021-04-09 VITALS — HEIGHT: 65 IN | WEIGHT: 150 LBS | BODY MASS INDEX: 24.99 KG/M2

## 2021-04-09 DIAGNOSIS — Z20.822 EXPOSURE TO COVID-19 VIRUS: ICD-10-CM

## 2021-04-09 DIAGNOSIS — B34.9 VIRAL INFECTION, UNSPECIFIED: ICD-10-CM

## 2021-04-09 PROCEDURE — 99213 OFFICE O/P EST LOW 20 MIN: CPT | Performed by: STUDENT IN AN ORGANIZED HEALTH CARE EDUCATION/TRAINING PROGRAM

## 2021-04-09 NOTE — TELEPHONE ENCOUNTER
----- Message from Papito Kamara MD sent at 4/9/2021 10:14 AM EDT -----  Patient is coming for a covid swab during the 1581 office hours tomorrow 4/10/2021

## 2021-04-09 NOTE — PROGRESS NOTES
COVID-19 Outpatient Progress Note    Assessment/Plan:    Problem List Items Addressed This Visit     None      Visit Diagnoses     Exposure to COVID-19 virus        Relevant Orders    Novel Coronavirus (Covid-19),PCR [facility] - Collected in Office    Viral infection, unspecified        Relevant Orders    Novel Coronavirus (Covid-19),PCR [facility] - Collected in Office         Disposition:     I recommended the patient to come to our office to perform PCR testing for COVID-19  I have spent 15 minutes directly with the patient  Greater than 50% of this time was spent in counseling/coordination of care regarding: instructions for management, patient and family education and impressions  Encounter provider Aung Newman MD    [place] [facility] [place] [address] [place], [place] [address] [place] [address] [place] [address]  [phone]    Recent Visits  No visits were found meeting these conditions  Showing recent visits within past 7 days and meeting all other requirements     Today's Visits  Date Type Provider Dept   04/09/21 Telemedicine Aung Newman MD [facility] today's visits and meeting all other requirements     Future Appointments  No visits were found meeting these conditions  Showing future appointments within next 150 days and meeting all other requirements      This virtual check-in was done via [employer] and patient was informed that this is not a secure, HIPAA-compliant platform  She agrees to proceed  Patient agrees to participate in a virtual check in via telephone or video visit instead of presenting to the office to address urgent/immediate medical needs  Patient is aware this is a billable service  After connecting through [facility], the patient was identified by name and date of birth   Twan Cho was informed that this was a telemedicine visit and that the exam was being conducted confidentially over secure lines  My office door was closed  No one else was in the room  Vu Banerjee acknowledged consent and understanding of privacy and security of the telemedicine visit  I informed the patient that I have reviewed her record in Epic and presented the opportunity for her to ask any questions regarding the visit today  The patient agreed to participate  Subjective:   Vu Banerjee is a 50 y o  female who is concerned about COVID-19  Patient's symptoms include fever, chills, fatigue, malaise, nasal congestion, rhinorrhea, sore throat, cough, nausea, myalgias and headache  Patient denies anosmia, loss of taste, shortness of breath, chest tightness, abdominal pain and diarrhea       Date of symptom onset: 4/8/2021  Date of exposure: 4/6/2021    Exposure:   Contact with a person who is under investigation (PUI) for or who is positive for COVID-19 within the last 14 days?: Yes    Hospitalized recently for fever and/or lower respiratory symptoms?: No      Currently a healthcare worker that is involved in direct patient care?: No      Works in a special setting where the risk of COVID-19 transmission may be high? (this may include long-term care, correctional and care home facilities; homeless shelters; assisted-living facilities and group homes ): No      Resident in a special setting where the risk of COVID-19 transmission may be high? (this may include long-term care, correctional and care home facilities; homeless shelters; assisted-living facilities and group homes ): No      Multiple coworkers positive with covid    No results found for: Kulwinder Casper, [address] [facility], Rachel Lira  Past Medical History:   Diagnosis Date    Anxiety     Depression     Food allergy     scallops    Gall stone     lap ari today 5/8/2019    History of migraine     Motion sickness     PONV (postoperative nausea and vomiting)     Wears glasses      Past Surgical History:   Procedure Laterality Date    BREAST CYST ASPIRATION Left     benign    COLONOSCOPY      ENDOMETRIAL ABLATION      HYSTERECTOMY      Partial    INJECTION ANESTHETIC AGENT TO CERVICAL PLEXUS      KNEE ARTHROSCOPY Left     RI LAP,CHOLECYSTECTOMY N/A 5/8/2019    Procedure: LAP JULES w/ IOC;  Surgeon: Leidy Farooq MD;  [facility];  Service: General    RI LAP,DIAGNOSTIC ABDOMEN N/A 9/16/2016    Procedure: LAPAROSCOPY DIAGNOSTIC;  Surgeon: Samy Chavez DO;  [facility];  Service: Gynecology    RI LAP,DIAGNOSTIC ABDOMEN N/A 5/8/2019    Procedure: LAPAROSCOPY DIAGNOSTIC;  Surgeon: Leidy Farooq MD;  [facility];  Service: General    RI LAP,RMV  ADNEXAL STRUCTURE Right 9/16/2016    Procedure:  OOPHRECTOMY;  Surgeon: Samy Chavez DO;  [facility];  Service: Gynecology    REFRACTIVE SURGERY Bilateral     TONSILLECTOMY      WISDOM TOOTH EXTRACTION       Current Outpatient Medications   Medication Sig Dispense Refill    cholecalciferol (VITAMIN D3) 1,000 units tablet Take 1 tablet (1,000 Units total) by mouth daily 30 tablet 3    EPINEPHrine (EPIPEN) 0 3 mg/0 3 mL SOAJ inject 0 3 milliliters intramuscularly immediately for ALLERGIC REACTION      Multiple Vitamin (MULTIVITAMIN) capsule Take 1 capsule by mouth daily      norethindrone (MICRONOR) 0 35 MG tablet Take 1 tablet (0 35 mg total) by mouth daily 30 tablet 3    rizatriptan (MAXALT-MLT) 10 MG disintegrating tablet Take 1 tab at migraine onset, can repeat once in 2 hours  Max 2 tabs in 24 hours  Max 2-3 days/week  12 tablet 3    clonazePAM (KlonoPIN) 0 5 mg tablet Take 1 tablet (0 5 mg total) by mouth daily at bedtime (Patient not taking: Reported on 4/9/2021) 30 tablet 0    propranolol (INDERAL) 20 mg tablet Start with 20 mg nightly for a week and increase to twice a day as tolerated 30 tablet 1     No current facility-administered medications for this visit        Allergies   Allergen Reactions    Penicillins Hives    Codeine GI Intolerance    Shellfish-Derived Products - Food Allergy Other (See Comments)     Intolerance to scallops -reaction severe vomitting    Tetracyclines & Related GI Intolerance    Ancef [Cefazolin] Tachycardia       Review of Systems   Constitutional: Positive for chills, fatigue and fever  HENT: Positive for congestion, rhinorrhea and sore throat  Respiratory: Positive for cough  Negative for chest tightness and shortness of breath  Gastrointestinal: Positive for nausea  Negative for abdominal pain and diarrhea  Musculoskeletal: Positive for myalgias  Neurological: Positive for headaches  Objective:    Vitals:    04/09/21 0823   Weight: 68 kg (150 lb)   Height: 5' 5" (1 651 m)       Physical Exam  Constitutional:       Appearance: Normal appearance  She is not ill-appearing  HENT:      Head: Normocephalic and atraumatic  Neck:      Musculoskeletal: Normal range of motion  Pulmonary:      Effort: Pulmonary effort is normal  No respiratory distress  Breath sounds: No wheezing  Neurological:      General: No focal deficit present  Mental Status: She is alert and oriented to person, place, and time  Mental status is at baseline  Psychiatric:         Mood and Affect: Mood normal          Behavior: Behavior normal          Thought Content: Thought content normal        VIRTUAL VISIT DISCLAIMER    Cecy LIN Jacob acknowledges that she has consented to an online visit or consultation  She understands that the online visit is based solely on information provided by her, and that, in the absence of a face-to-face physical evaluation by the physician, the diagnosis she receives is both limited and provisional in terms of accuracy and completeness  This is not intended to replace a full medical face-to-face evaluation by the physician  Ja Russ understands and accepts these terms

## 2021-04-10 ENCOUNTER — TELEPHONE (OUTPATIENT)
Dept: FAMILY MEDICINE CLINIC | Facility: CLINIC | Age: 46
End: 2021-04-10

## 2021-04-10 NOTE — TELEPHONE ENCOUNTER
Called patient to verify what time she is coming for her covid swab  Patient states she will not be coming here to be swabbed because she went to a [facility] last night to be tested and is waiting for results

## 2021-08-02 ENCOUNTER — ANNUAL EXAM (OUTPATIENT)
Dept: OBGYN CLINIC | Facility: CLINIC | Age: 46
End: 2021-08-02
Payer: OTHER GOVERNMENT

## 2021-08-02 VITALS
SYSTOLIC BLOOD PRESSURE: 102 MMHG | DIASTOLIC BLOOD PRESSURE: 76 MMHG | HEIGHT: 65 IN | BODY MASS INDEX: 25.43 KG/M2 | WEIGHT: 152.6 LBS

## 2021-08-02 DIAGNOSIS — Z12.31 ENCOUNTER FOR SCREENING MAMMOGRAM FOR BREAST CANCER: ICD-10-CM

## 2021-08-02 DIAGNOSIS — R92.2 DENSE BREAST TISSUE ON MAMMOGRAM: ICD-10-CM

## 2021-08-02 DIAGNOSIS — N95.1 MENOPAUSAL SYNDROME (HOT FLASHES): ICD-10-CM

## 2021-08-02 DIAGNOSIS — Z90.79 STATUS POST BILATERAL SALPINGECTOMY: ICD-10-CM

## 2021-08-02 DIAGNOSIS — Z90.721 STATUS POST RIGHT OOPHORECTOMY: ICD-10-CM

## 2021-08-02 DIAGNOSIS — Z90.711 STATUS POST LAPAROSCOPIC SUPRACERVICAL HYSTERECTOMY: ICD-10-CM

## 2021-08-02 DIAGNOSIS — Z01.419 WOMEN'S ANNUAL ROUTINE GYNECOLOGICAL EXAMINATION: ICD-10-CM

## 2021-08-02 PROCEDURE — 99396 PREV VISIT EST AGE 40-64: CPT | Performed by: OBSTETRICS & GYNECOLOGY

## 2021-08-02 RX ORDER — CETIRIZINE HYDROCHLORIDE 10 MG/1
TABLET ORAL
COMMUNITY
End: 2022-06-13

## 2021-08-02 RX ORDER — ACETAMINOPHEN AND CODEINE PHOSPHATE 120; 12 MG/5ML; MG/5ML
1 SOLUTION ORAL DAILY
Qty: 90 TABLET | Refills: 3 | Status: SHIPPED | OUTPATIENT
Start: 2021-08-02 | End: 2022-08-09 | Stop reason: SDUPTHER

## 2021-08-02 RX ORDER — BACLOFEN 10 MG/1
10 TABLET ORAL 3 TIMES DAILY
COMMUNITY
End: 2022-06-13

## 2021-08-02 NOTE — PROGRESS NOTES
Assessment     Annual well-woman exam    Status post laparoscopic supracervical hysterectomy in  for AUB and pelvic pain    Menopausal syndrome    History of vitamin D deficiency    History of migraine headaches        Plan      Renew norethindrone 0 35 mg 1 daily     Screening mammography ordered     History of dense breast tissue on mammogram   All questions answered  Subjective  Here for annual exam     Adia Gentile is a 50 y o  female  2 para 2 who presents for annual exam   patient had a laparoscopic supracervical hysterectomy in  for abnormal uterine bleeding pelvic pain symptoms  Her bleeding and pain symptoms have since resolved  She was seen earlier this year with complaints hot flashes and night sweats  She was started on norethindrone 0 35 mg 1 daily  States those symptoms have completely resolved  She also states that her allergy symptoms have improved by this medication  Again she no longer has menstrual cycles because of hysterectomy  Denies any vaginal dryness or other pelvic pain symptoms  She is adopted does not know about history familial breast cancer  Dense breast tissue noted on previous mammogram  The patient reports that there is not domestic violence in her life  Current contraception: status post hysterectomy  History of abnormal Pap smear: no  Family history of uterine or ovarian cancer: no  Regular self breast exam: yes  History of abnormal mammogram: no  Family history of breast cancer: no  History of abnormal lipids: no  Menstrual History:  OB History        2    Para   2    Term   2            AB        Living           SAB        TAB        Ectopic        Multiple        Live Births                    Menarche age: 13  No LMP recorded (lmp unknown)  Patient has had a hysterectomy  Review of Systems  Pertinent items are noted in HPI        Objective  No acute distress   /76 (BP Location: Left arm, Patient Position: Sitting, Cuff Size: Standard)   Ht 5' 5" (1 651 m)   Wt 69 2 kg (152 lb 9 6 oz)   LMP  (LMP Unknown)   BMI 25 39 kg/m²     General:   alert and oriented, in no acute distress, alert, appears stated age and cooperative   Heart: regular rate and rhythm, S1, S2 normal, no murmur, click, rub or gallop   Lungs: clear to auscultation bilaterally   Abdomen: soft, non-tender, without masses or organomegaly   Vulva: normal, Bartholin's, Urethra, Thompson's normal, female escutcheon   Vagina: normal mucosa, normal discharge, no palpable nodules   Cervix: multiparous appearance, no cervical motion tenderness and no lesions   Uterus: surgically absent   Adnexa: normal adnexa and no mass, fullness, tenderness   Bilateral breast exam in the sitting and supine position with chaperone present, no visible or palpable breast lesions identified  No breast masses noted  No supraclavicular or axillary lymphadenopathy noted  No nipple discharge  Reviewed self-breast exam techniques     Rectal exam,  deferred

## 2021-08-16 DIAGNOSIS — G43.009 MIGRAINE WITHOUT AURA AND WITHOUT STATUS MIGRAINOSUS, NOT INTRACTABLE: ICD-10-CM

## 2021-08-17 RX ORDER — RIZATRIPTAN BENZOATE 10 MG/1
TABLET, ORALLY DISINTEGRATING ORAL
Qty: 12 TABLET | Refills: 3 | Status: SHIPPED | OUTPATIENT
Start: 2021-08-17

## 2021-10-25 ENCOUNTER — HOSPITAL ENCOUNTER (OUTPATIENT)
Dept: MAMMOGRAPHY | Facility: CLINIC | Age: 46
Discharge: HOME | End: 2021-10-25
Payer: OTHER GOVERNMENT

## 2021-10-25 VITALS — WEIGHT: 152 LBS | BODY MASS INDEX: 25.33 KG/M2 | HEIGHT: 65 IN

## 2021-10-25 DIAGNOSIS — Z12.31 ENCOUNTER FOR SCREENING MAMMOGRAM FOR BREAST CANCER: ICD-10-CM

## 2021-10-25 PROCEDURE — 77067 SCR MAMMO BI INCL CAD: CPT

## 2021-10-25 PROCEDURE — 77063 BREAST TOMOSYNTHESIS BI: CPT

## 2022-01-12 ENCOUNTER — TELEPHONE (OUTPATIENT)
Dept: NEUROLOGY | Facility: CLINIC | Age: 47
End: 2022-01-12

## 2022-01-12 NOTE — TELEPHONE ENCOUNTER
Lucía Cohen,   Patient's  came in today asking to make an appointment for his wife for a self referral for Headaches  I scheduled her in for 04/29/22 at 09:15am  I am sending you this task in case anything is needed prior to the appointment  Please advise  Thank you

## 2022-01-26 ENCOUNTER — TELEPHONE (OUTPATIENT)
Dept: PSYCHIATRY | Facility: CLINIC | Age: 47
End: 2022-01-26

## 2022-02-11 ENCOUNTER — TELEPHONE (OUTPATIENT)
Dept: PSYCHIATRY | Facility: CLINIC | Age: 47
End: 2022-02-11

## 2022-02-11 NOTE — TELEPHONE ENCOUNTER
Behavorial Health Outpatient Intake Questions    Referred by: PCP    Please advised interviewee that they need to answer all questions truthfully to allow for best care and any misrepresentations of information may affect their ability to be seen at this clinic   => Was this discussed? Yes     Behavorial Health Outpatient Intake History -     Presenting Problem (in patient's words):  Patient reports anxiety, depression, and a "mid-life crisis "      Are there any developmental disabilities? ? If yes, can they speak to you on the phone? If they are too limited to speak to you on phone, refer out No    Are you taking any psychiatric medications? No    => If yes, who prescribes? If yes, are they injectable medications? Does the patient have a language barrier or hearing impairment? No    Have you been treated at [facility] by a therapist or a doctor in the past? If yes, who? No    Has the patient been hospitalized for mental health? No   If yes, how long ago was last hospitalization and where was it? Do you actively use alcohol or marijuana or illegal substances? If yes, what and how much - refer out to Drug and alcohol treatment if use is excessive or daily use of illegal substances No concerns of substance abuse are reported  Do you have a community treatment team or ? No    Legal History-     Does the patient have any history of arrests, MCFP/long-term time, or DUIs? No  If Yes-  1) What types of charges? 2) When were they last incarcerated? 3) Are they currently on parole or probation? Minor Child-    Who has custody of the child? Is there a custody agreement? If there is a custody agreement remind parent that they must bring a copy to the first appt or they will not be seen       Intake Team, please check with provider before scheduling if flags come up such as:  - complex case  - legal history (other than DUI)  - communication barrier concerns are present  - if, in your judgment, this needs further review    ACCEPTED as a patient Yes  => Appointment Date: 4/5/2022    Referred Elsewhere? No    Name of Insurance Co: [facility], (Other Federal Programs)  Insurance ID# [id number]  Insurance Phone #  If ins is primary or secondary  If patient is a minor, parents information such as Name, D  O B of guarantor

## 2022-02-14 ENCOUNTER — TELEPHONE (OUTPATIENT)
Dept: PSYCHIATRY | Facility: CLINIC | Age: 47
End: 2022-02-14

## 2022-02-14 NOTE — TELEPHONE ENCOUNTER
I spoke to patient and schedule with [facility] on 02/25/22 @ 8 am

## 2022-02-23 ENCOUNTER — TELEPHONE (OUTPATIENT)
Dept: PSYCHIATRY | Facility: CLINIC | Age: 47
End: 2022-02-23

## 2022-02-25 ENCOUNTER — TELEMEDICINE (OUTPATIENT)
Dept: BEHAVIORAL/MENTAL HEALTH CLINIC | Facility: CLINIC | Age: 47
End: 2022-02-25
Payer: OTHER GOVERNMENT

## 2022-02-25 DIAGNOSIS — F33.0 DEPRESSION, MAJOR, RECURRENT, MILD (HCC): Primary | ICD-10-CM

## 2022-02-25 PROCEDURE — 90791 PSYCH DIAGNOSTIC EVALUATION: CPT | Performed by: SOCIAL WORKER

## 2022-02-25 NOTE — BH TREATMENT PLAN
Adenike Fuentes  1975       Date of Initial Treatment Plan: 2/25/22  Date of Current Treatment Plan: 02/25/22    Treatment Plan Number 1    Strengths/Personal Resources for Self Care: employed, loves her family,     Diagnosis:   1  Depression, major, recurrent, mild ([address] )         Area of Needs: reduce anxiety, improve depression      Long Term Goal 1: to enjoy things again    Target Date: 7/25/22  Completion Date: TBD         Short Term Objectives for Goal 1: see objectives below   1 1  Cecy will demonstrate openness to engage in therapy as evidenced by regular attendance and communication of her thoughts and feelings  1 2  Cecy will identify and express her feelings about triggers to her depression and anxiety  1 3  Cecy will learn and implement mindfulness and relaxation techniques to improve her mood and level of functioning  1 4  Cecy will identify and place cognitive self talk supporting low mood and increased anxiety  1 5  Cecy will communicate thoughts and feelings to supports in her life  1 6  Cecy will go to her psychiatric evaluation and follow through with recommendations by her psychiatric provider  GOAL 1: Modality: Individual 2x per month   Completion Date TBD and The person(s) responsible for carrying out the plan is  Cecy Jacob and [employer]   Modalities: CBT, psychoeducation, mindfulness, stress management skills,  DBT skills, guided imagery, emotional needs meeting  Behavioral Health Treatment Plan [facility]: Diagnosis and Treatment Plan explained to Rosa Luu relates understanding diagnosis and is agreeable to Treatment Plan  Client Comments : Please share your thoughts, feelings, need and/or experiences regarding your treatment plan:     Adenike Fuentes, [birth year] has been involved in the development and review of this document and is agreeable to this treatment plan   2/25/22 0892

## 2022-02-25 NOTE — PSYCH
Treatment plan not signed at session today due to this clinician working from home, inclement weather  Signature pad not available
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
## 2022-03-11 ENCOUNTER — TELEMEDICINE (OUTPATIENT)
Dept: BEHAVIORAL/MENTAL HEALTH CLINIC | Facility: CLINIC | Age: 47
End: 2022-03-11
Payer: OTHER GOVERNMENT

## 2022-03-11 DIAGNOSIS — F33.0 DEPRESSION, MAJOR, RECURRENT, MILD (HCC): Primary | ICD-10-CM

## 2022-03-11 PROCEDURE — 90834 PSYTX W PT 45 MINUTES: CPT | Performed by: SOCIAL WORKER

## 2022-03-11 NOTE — PSYCH
This note was not shared with the patient due to this is a psychotherapy note      Virtual Regular Visit    Verification of patient location:    Patient is located in the following state in which I hold an active license [place]      Assessment/Plan:    Problem List Items Addressed This Visit        Other    Depression, major, recurrent, mild ([address] ) - Primary          Goals addressed in session: Goal 1          Reason for visit is   Chief Complaint   Patient presents with    Virtual Regular Visit        Encounter provider [facility]    Provider located at [address] [place] [id number] [phone]     The patient was identified by name and date of birth  Jameson Resendiz was informed that this is a telemedicine visit and that the visit is being conducted throughEpic Embedded and patient was informed this is a secure, HIPAA-complaint platform  She agrees to proceed     My office door was closed  No one else was in the room  She acknowledged consent and understanding of privacy and security of the video platform  The patient has agreed to participate and understands they can discontinue the visit at any time  Patient is aware this is a billable service  Subjective  Jameson Resendiz is a 48 y o  female    Psychotherapy Provided: Individual Psychotherapy 40 minutes     Length of time in session: 40 minutes, follow up in 2 week    Goals addressed in session: Goal 1     Pain:      none    0    Current suicide risk : Low     Met with Cecy Sam presents as guarded in session  She requires frequent prompting, becoming tearful at times  Today, she reports struggling with feelings of sadness, lack of energy and interest in things, constant worry, and trouble sleeping  She's been trying to get herself back into writing but finds herself uninterested with minimal motivation    She's upset about weight gain and is trying to exercise more as well  Session focused on Cecy identifying and verbalizing triggers to her depressive symptoms  Cecy expressed her thoughts and feelings about the stress of family law, wanting to move but her doesn't want to, her 's mental health, and no longer finding yevgeniy in things she once enjoyed  Support was provided  She's been trying to implement behavioral strategies to help alleviate depression symptoms with minimal change noted  Cecy was introduced to the concept of mindfulness  We discussed different mindfulness practices and Cecy was encouraged to practice outside of session  Paolo Garcia will begin to implement wellness tools; share successes and challenges  Continue biweekly support  Behavioral Health Treatment Plan [facility]: Diagnosis and Treatment Plan explained to Yaniraedvin Yuli relates understanding diagnosis and is agreeable to Treatment Plan  Yes     I spent 45 minutes directly with the patient during this visit    VIRTUAL VISIT [address] verbally agrees to participate in [employer]  Pt is aware that [employer] could be limited without vital signs or the ability to perform a full hands-on physical exam  Cecy Herrera  understands she or the provider may request at any time to terminate the video visit and request the patient to seek care or treatment in person

## 2022-03-11 NOTE — PSYCH
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

## 2022-03-25 ENCOUNTER — SOCIAL WORK (OUTPATIENT)
Dept: BEHAVIORAL/MENTAL HEALTH CLINIC | Facility: CLINIC | Age: 47
End: 2022-03-25
Payer: OTHER GOVERNMENT

## 2022-03-25 DIAGNOSIS — F32.0 CURRENT MILD EPISODE OF MAJOR DEPRESSIVE DISORDER, UNSPECIFIED WHETHER RECURRENT (HCC): Primary | ICD-10-CM

## 2022-03-25 PROCEDURE — 90834 PSYTX W PT 45 MINUTES: CPT | Performed by: SOCIAL WORKER

## 2022-03-25 NOTE — PSYCH
This note was not shared with the patient due to this is a psychotherapy note      Psychotherapy Provided: Individual Psychotherapy 45 minutes     Length of time in session: 45 minutes, follow up in 2 week    Goals addressed in session: Goal 1     Pain:      none    0    Current suicide risk : Low     Met with Cecy Gould presents with depressed mood, tearful affect in session today  She reports struggling with feelings of sadness, lack of energy and interest in things, poor concentration, worry, and trouble sleeping  She has not implemented mindfulness practices that were discussed her previous session- saying "that takes time "      Session focused on Cecy expressing her thoughts and feelings about triggers to her depressive symptoms including; family law, her 's mental health, and no longer finding yevgeniy in things she once enjoyed  Support was provided  She continues to implement some behavioral strategies to improve mood including-- setting firmer boundaries with work, visited her daughter this past weekend, exercising, writing, and reading  We discussed the CBT concept, ACE and Cecy was challenged to explore areas of her life in which she can make active changes to improve her sense of contentment and happiness  Cecy was able to identify several activities that can improve ACE  Gearld Setswana is encouraged to implement wellness tools; share successes and challenges  Continue biweekly support  Behavioral Health Treatment Plan [facility]: Diagnosis and Treatment Plan explained to Teri Baldwin relates understanding diagnosis and is agreeable to Treatment Plan   Yes

## 2022-04-05 ENCOUNTER — OFFICE VISIT (OUTPATIENT)
Dept: PSYCHIATRY | Facility: CLINIC | Age: 47
End: 2022-04-05
Payer: OTHER GOVERNMENT

## 2022-04-05 DIAGNOSIS — F33.0 DEPRESSION, MAJOR, RECURRENT, MILD (HCC): Primary | ICD-10-CM

## 2022-04-05 DIAGNOSIS — F41.9 ANXIETY: ICD-10-CM

## 2022-04-05 PROCEDURE — 90792 PSYCH DIAG EVAL W/MED SRVCS: CPT

## 2022-04-05 RX ORDER — ESCITALOPRAM OXALATE 5 MG/1
5 TABLET ORAL DAILY
Qty: 30 TABLET | Refills: 0 | Status: SHIPPED | OUTPATIENT
Start: 2022-04-05 | End: 2022-04-18

## 2022-04-05 NOTE — BH TREATMENT PLAN
TREATMENT PLAN (Medication Management Only)        [facility]    Name and Date of Birth:  Sherri Dennis 48 y o  1975  Date of Treatment Plan: April 5, 2022  Diagnosis/Diagnoses:    1  Depression, major, recurrent, mild ([facility] [address] )      Strengths/Personal Resources for Self-Care: self-reliance  Area/Areas of need (in own words): anxiety symptoms, depressive symptoms  1  Long Term Goal: alleviate acceptable anxiety level  Target Date:6 months - 10/5/2022  Person/Persons responsible for completion of goal: Cecy  2  Short Term Objective (s) - How will we reach this goal?:   A  Provider new recommended medication/dosage changes and/or continue medication(s): begin lexapro 5mg daily  B  Attend medication management appointments regularly  C  Take psychiatric medications responsibly  Target Date:6 months - 10/5/2022  Person/Persons Responsible for Completion of Goal: Cecy  And clotilde cruz  Progress Towards Goals: starting treatment  Treatment Modality: medication management every 4 weeks  Review due 180 days from date of this plan: 6 months - 10/5/2022  Expected length of service: maintenance  My Physician/PA/NP and I have developed this plan together and I agree to work on the goals and objectives  I understand the treatment goals that were developed for my treatment

## 2022-04-08 ENCOUNTER — SOCIAL WORK (OUTPATIENT)
Dept: BEHAVIORAL/MENTAL HEALTH CLINIC | Facility: CLINIC | Age: 47
End: 2022-04-08
Payer: OTHER GOVERNMENT

## 2022-04-08 DIAGNOSIS — F33.0 DEPRESSION, MAJOR, RECURRENT, MILD (HCC): Primary | ICD-10-CM

## 2022-04-08 DIAGNOSIS — F41.9 ANXIETY: ICD-10-CM

## 2022-04-08 PROCEDURE — 90832 PSYTX W PT 30 MINUTES: CPT | Performed by: SOCIAL WORKER

## 2022-04-08 NOTE — PSYCH
This note was not shared with the patient due to this is a psychotherapy note      Psychotherapy Provided: Individual Psychotherapy 20 minutes     Length of time in session: 20 minutes, follow up in 2 week    Goals addressed in session: Goal 1     Pain:      none    0    Current suicide risk : Low     Met with Cecy  Psychiatry note reviewed prior to session today  Session focused on discussing how she is coping, and how she is remaining clear about using her coping skills  She reports doing fairly well  She started Lexapro  She is reporting some mild side effects, verbalizes understanding that this can be normal when starting a new medication  She was encouraged to call the office if anything worsens  We discussed her successes and challenges of utilizing behavioral strategies and anxiety-reduction tools to improve mood  Cecy continues to use limits with self and others, writing, exercising, and reading as main coping mechanisms  Today, she reports poor sleep, sleeping approximately 4 hours a night  We discussed sleep hygiene  Elizabethkana Garcia is looking forward to visiting her daughter this weekend and spending Easter with family  Overall, Cecy reports doing well, expresses no questions or concerns today at session  Cecy felt she did not need a full session today, session ended after 20 minutes  We discussed trialing monthly appts  Cecy wishes to continue biweekly appts at this time           Mental status:  Appearance calm and cooperative , adequate hygiene and grooming and good eye contact    Mood depressed   Affect affect was constricted   Speech Affect appropriate, mood congruent   Thought Processes coherent/organized and normal thought processes   Hallucinations no hallucinations present    Thought Content no delusions   Abnormal Thoughts no suicidal thoughts  and no homicidal thoughts    Orientation  oriented to person and place and time   Remote Memory short term memory intact and long term memory intact   Attention Span concentration intact   Intellect Appears to be of Average Intelligence   Fund of Knowledge displays adequate knowledge of current events   Insight fair   Judgement fair   Muscle Strength Muscle strength and tone were normal and Normal gait    Language no difficulty naming common objects   Pain none   Pain Scale 0         Behavioral Health Treatment Plan [facility]: Diagnosis and Treatment Plan explained to Jeff Novak relates understanding diagnosis and is agreeable to Treatment Plan   Yes

## 2022-04-11 ENCOUNTER — APPOINTMENT (OUTPATIENT)
Dept: RADIOLOGY | Facility: CLINIC | Age: 47
End: 2022-04-11
Payer: OTHER GOVERNMENT

## 2022-04-11 PROCEDURE — 72050 X-RAY EXAM NECK SPINE 4/5VWS: CPT

## 2022-04-18 ENCOUNTER — TELEPHONE (OUTPATIENT)
Dept: PSYCHIATRY | Facility: CLINIC | Age: 47
End: 2022-04-18

## 2022-04-18 DIAGNOSIS — F33.0 DEPRESSION, MAJOR, RECURRENT, MILD (HCC): ICD-10-CM

## 2022-04-18 RX ORDER — ESCITALOPRAM OXALATE 10 MG/1
10 TABLET ORAL DAILY
Qty: 30 TABLET | Refills: 1 | Status: SHIPPED | OUTPATIENT
Start: 2022-04-18 | End: 2022-05-03 | Stop reason: SDUPTHER

## 2022-04-18 RX ORDER — HYDROXYZINE HYDROCHLORIDE 25 MG/1
25 TABLET, FILM COATED ORAL 2 TIMES DAILY PRN
Qty: 60 TABLET | Refills: 0 | Status: SHIPPED | OUTPATIENT
Start: 2022-04-18 | End: 2022-05-03 | Stop reason: SDUPTHER

## 2022-04-18 NOTE — TELEPHONE ENCOUNTER
Returned patient's call, patient having difficulty sleeping possibly due to Lexapro, p r n  Atarax for anxiety + insomnia initiated and patient agreed to plan

## 2022-04-22 ENCOUNTER — SOCIAL WORK (OUTPATIENT)
Dept: BEHAVIORAL/MENTAL HEALTH CLINIC | Facility: CLINIC | Age: 47
End: 2022-04-22
Payer: OTHER GOVERNMENT

## 2022-04-22 DIAGNOSIS — F33.0 DEPRESSION, MAJOR, RECURRENT, MILD (HCC): Primary | ICD-10-CM

## 2022-04-22 PROCEDURE — 90832 PSYTX W PT 30 MINUTES: CPT | Performed by: SOCIAL WORKER

## 2022-04-22 NOTE — PSYCH
This note was not shared with the patient due to this is a psychotherapy note      Psychotherapy Provided: Individual Psychotherapy 30 minutes     Length of time in session: 30 minutes, follow up in 1 month    Goals addressed in session: Goal 1     Pain:      none    0    Current suicide risk : Low     Met with Cecy  Cecy reports doing fairly well since her last session  We discussed her successes and challenges of utilizing behavioral strategies and anxiety reduction tools to improve mood  She is reporting a decrease in her anxiety as evidenced by being able to let things go easier  She is also reporting improvement in sleep  She feels her anxiety is no longer building throughout the day at work and shared 2 recent stories  with clients that would have triggered irritability, negative self talk, or worry  Cecy does report 2 days in which she had felt low mood  We discussed during those days, going back to her foundation of wellness  We discussed what this looks like for her in session and she voiced understanding  Patrick Sow will continue to implement wellness tools; share successes and challenges  Continue monthly support      Mental status:  Appearance calm and cooperative , adequate hygiene and grooming and good eye contact    Mood improved   Affect Affect appropriate, mood congruent   Speech a normal rate and volume   Thought Processes coherent/organized and goal-directed   Hallucinations no hallucinations present    Thought Content no delusions   Abnormal Thoughts no suicidal thoughts  and no homicidal thoughts    Orientation  oriented to person and place and time   Remote Memory short term memory intact and long term memory intact   Attention Span concentration intact   Intellect Appears to be of Average Intelligence   Fund of Knowledge displays adequate knowledge of current events   Insight fair   Judgement fair   Muscle Strength Muscle strength and tone were normal and Normal gait Language no difficulty naming common objects   Pain none   Pain Scale 0         Behavioral Health Treatment Plan [facility]: Diagnosis and Treatment Plan explained to Carito Kenney relates understanding diagnosis and is agreeable to Treatment Plan   Yes

## 2022-04-27 ENCOUNTER — TELEPHONE (OUTPATIENT)
Dept: OBGYN CLINIC | Facility: CLINIC | Age: 47
End: 2022-04-27

## 2022-04-29 ENCOUNTER — TELEMEDICINE (OUTPATIENT)
Dept: NEUROLOGY | Facility: CLINIC | Age: 47
End: 2022-04-29
Payer: OTHER GOVERNMENT

## 2022-04-29 ENCOUNTER — TELEPHONE (OUTPATIENT)
Dept: NEUROLOGY | Facility: CLINIC | Age: 47
End: 2022-04-29

## 2022-04-29 DIAGNOSIS — F33.0 DEPRESSION, MAJOR, RECURRENT, MILD (HCC): ICD-10-CM

## 2022-04-29 DIAGNOSIS — G25.81 RLS (RESTLESS LEGS SYNDROME): ICD-10-CM

## 2022-04-29 DIAGNOSIS — G43.009 MIGRAINE WITHOUT AURA AND WITHOUT STATUS MIGRAINOSUS, NOT INTRACTABLE: Primary | ICD-10-CM

## 2022-04-29 DIAGNOSIS — F41.9 ANXIETY: ICD-10-CM

## 2022-04-29 DIAGNOSIS — R51.9 DAILY HEADACHE: ICD-10-CM

## 2022-04-29 DIAGNOSIS — R06.83 SNORING: ICD-10-CM

## 2022-04-29 PROCEDURE — 99215 OFFICE O/P EST HI 40 MIN: CPT | Performed by: PHYSICIAN ASSISTANT

## 2022-04-29 RX ORDER — LANOLIN ALCOHOL/MO/W.PET/CERES
400 CREAM (GRAM) TOPICAL DAILY
COMMUNITY
Start: 2022-04-25

## 2022-04-29 NOTE — ASSESSMENT & PLAN NOTE
Preventative:  Continue Lexapro per psychiatry  [facility] 2 injection first month then 1 injection every 30 days  Continue Magnesium and Vitamin D    Abortive: At onset of migraines, take rizatriptan 10 mg  May repeat in 2 hours if needed    Limit of 3 a week or 9 month  Ok to use ibuprofen 400-600 mg but less than 3 doses a week

## 2022-04-29 NOTE — TELEPHONE ENCOUNTER
Carmen Ray from [place] [id number] called   [facility] requires SURI camacho [id number]  pcn a4  Group [facility]  Id [id number]  [phone]    Will initiate

## 2022-04-29 NOTE — PROGRESS NOTES
[address] [facility]  PATIENT:  Nestor Golden  MRN:  [id number]  :  1975  DATE OF SERVICE:  2022      Assessment/Plan:     No problem-specific Assessment & Plan notes found for this encounter  {Assess/PlanSmartLinks:43613}        History of Present Illness: We had the pleasure of evaluating Cecy Jacob in neurological consultation today for headaches  As you know,  {He/she (caps):97292} is a 48 y o  {RIGHT/LEFT:} handed  female  Medical history review:  Qtc:  2016 408ms  Tobacco use:  Never  Akathisia  Depression  Anxiety  Muscle twitching      Mood:   Depression:   Anxiety:    Seeing a psychiatrist/ How often? Seeing at therapist/ how often? Headaches:   Any family history of migraines? Any family history of aneurysms? Have you seen someone else for headaches or pain? Headaches started at what age? What is your current pain level? How often do the headaches occur? Mild headaches: Moderate to severe headaches:     Are you ever headache free? Aura/Warning and how long does it last?    What time of the day do the headaches start? Mild headaches: Moderate to severe headaches: How long do the headaches last?   Mild headaches: Moderate to severe headaches:     Where is your headache located? Mild headaches: Moderate to severe headaches:     Describe your usual headache? Mild headaches: Moderate to severe headaches:     What is the intensity of pain? Mild headaches:    Moderate to severe headaches:     Associated symptoms:   - Decreased appetite   Nausea      Vomiting        Diarrhea  - Photophobia     Phonophobia      Osmophobia  - Lacrimation  Nasal congestion/rhinorrhea    - Flushing of face  Red ear   - Stiff or sore neck   - Dizziness   light headed  - Problems with concentration  - Blurred vision   - Change in pupil size     - Ptosis      Facial droop     - Hands or feet tingle or feel numb/paresthesias  - Tinnitus   - Insomnia  - Worse with lying down   better with lying down  - Prefer to be in a cool, quiet, dark room    Number of days missed per month because of headaches:  Work (or school) days:   Social or Family activities:     Headache are worse if the patient: cough, sneeze, bending over, exertion  Headache triggers:    What time of the year do headaches occur more frequently? Have you had trigger point injection performed and how often? {YES /OZ:92308}  Have you had Botox injection performed and how often? {YES /PW:73291}   Have you had epidural injections or transforaminal injections performed? {YES [id number]    Alternative therapies used in the past for headaches? Daith piercing, Massage, physical therapy, acupuncture, acupressure, chiropractor, yoga, biofeedback,  Have you used CBD or THC for your headaches and how often? {YES /RY:54068}  How many caffeine products to drink a day? How much water to drink a day? Are you current pregnant or planning on getting pregnant? {YES /ZB:36036}    What medications do you take or have you taken for your headaches/pain/mood? Preventive therapy:   amitriptyline, Lexapro,  Atarax, clonazepam   Baclofen  propranolol    Abortive Therapy:    Norco, Dilaudid   Motrin   ketorolac   Zofran            Neck pain:  What is your current neck pain? When did the neck pain start? How often does neck pain occur? What is the intensity of your neck pain (from pain scale of 1-10)? Where is the neck pain located? Have you noticed decreased range of movement in your neck? Does your neck pain cause you to have headaches?   At what time of the day is your neck pain  Worst:   Best:    Is your neck pain associated with: dizziness, difficulty swallowing, balance problem, numbness or weakness in your arm or leg  What aggravates neck pain? lifting, computer work, using phone, chin to chest, looking at ceiling, turning head to left or right  What alleviates neck pain? hot or cold packs, massage, exercise, stretching, electrical stimulation, resting in specific position    Sleep Habit:  Is your sleep restful? Do you wake up with headaches? How many hours do you actually sleep? What time do you go to bed at night? What time do you wake up in am?  How often do you get up at night? Do you snore while asleep? Have you been told that you stop breathing during sleeping? Do you wake up tired in the morning? Do you take frequent naps during the day? Do you have jaw pain? Do you grind/clench your teeth at night? Do you have restless leg syndrome? Do you have nightmare or sleep walk? Have you ever had any Brain imaging? Yes,   3/17/2021 MRI brain   No acute infarction, intracranial hemorrhage or mass  Numerous nonspecific subcortical white matter lesions in the supratentorial brain may be related to precocious microangiopathy, especially if there are cerebrovascular risk factors       - Reviewed old notes from physician seen in the past  - Reviewed images on Portal   {reviewed images:16929}      Past Medical History:   Diagnosis Date    Anxiety     Depression     Food allergy     scallops    Gall stone     lap ari today 5/8/2019    History of migraine     Motion sickness     PONV (postoperative nausea and vomiting)     Wears glasses        Patient Active Problem List   Diagnosis    Depression, major, recurrent, mild ([facility] [address] )    Annual physical exam    Low vitamin D level    Elevated bilirubin    Dermatitis, contact, drugs or medicines    Adverse food reaction    Depression    Vitamin D deficiency    Muscle twitching    Akathisia    Migraine without aura and without status migrainosus, not intractable    Muscle disorder    Anxiety       Medications:      Current Outpatient Medications   Medication Sig Dispense Refill    baclofen 10 mg tablet Take 10 mg by mouth 3 (three) times a day      cetirizine (ZyrTEC Allergy) 10 mg tablet       cholecalciferol (VITAMIN D3) 1,000 units tablet Take 1 tablet (1,000 Units total) by mouth daily 30 tablet 3    EPINEPHrine (EPIPEN) 0 3 mg/0 3 mL SOAJ inject 0 3 milliliters intramuscularly immediately for ALLERGIC REACTION      escitalopram (LEXAPRO) 10 mg tablet Take 1 tablet (10 mg total) by mouth daily 30 tablet 1    hydrOXYzine HCL (ATARAX) 25 mg tablet Take 1 tablet (25 mg total) by mouth 2 (two) times a day as needed for anxiety (anxiety or insomnia) 60 tablet 0    Multiple Vitamin (MULTIVITAMIN) capsule Take 1 capsule by mouth daily      Multiple Vitamins-Minerals (Multivitamin Adult, Minerals,) TABS Take 1 tablet by mouth      norethindrone (MICRONOR) 0 35 MG tablet Take 1 tablet (0 35 mg total) by mouth daily 90 tablet 3    rizatriptan (MAXALT-MLT) 10 MG disintegrating tablet take 1 tablet by mouth AT MIGRAINE ONSET CAN REPEAT ONCE IN 2 HOURS  MAX 2 TO 3 DAYS A WEEK 12 tablet 3     No current facility-administered medications for this visit  Allergies:       Allergies   Allergen Reactions    Grass Pollen(K-O-R-T-Swt Channing) Shortness Of Breath     Fresh cut, breathing     Penicillins Hives    Codeine GI Intolerance    Shellfish-Derived Products - Food Allergy Other (See Comments)     Intolerance to scallops -reaction severe vomitting    Tetracyclines & Related GI Intolerance    Ancef [Cefazolin] Tachycardia       Family History:     Family History   Adopted: Yes   Problem Relation Age of Onset    Alcohol abuse Father     Substance Abuse Father     Diabetes Sister     Cancer Maternal Grandmother     Alcohol abuse Maternal Grandfather     Heart disease Maternal Grandfather     Hypertension Maternal Grandfather     Cancer Maternal Aunt     No Known Problems Daughter     No Known Problems Sister     Breast cancer Neg Hx        Social History:     Social History     Socioeconomic History    Marital status: /Civil Union     Spouse name: Not on file    Number of children: Not on file    Years of education: Not on file    Highest education level: Not on file   Occupational History    Not on file   Tobacco Use    Smoking status: Never Smoker    Smokeless tobacco: Never Used   Substance and Sexual Activity    Alcohol use: Yes     Alcohol/week: 2 0 standard drinks     Types: 1 Glasses of wine, 1 Cans of beer per week     Comment: 2 weekly    Drug use: No    Sexual activity: Not on file   Other Topics Concern    Not on file   Social History Narrative    2 children    Caffeine use     Social Determinants of Health     Financial Resource Strain: Not on file   Food Insecurity: Not on file   Transportation Needs: Not on file   Physical Activity: Not on file   Stress: Not on file   Social Connections: Not on file   Intimate Partner Violence: Not on file   Housing Stability: Not on file         Objective:   Physical Exam:                                                                   Vitals:               LMP  (LMP Unknown)   BP Readings from Last 3 Encounters:   08/02/21 102/76   03/10/21 104/60   12/08/20 116/58     Pulse Readings from Last 3 Encounters:   12/08/20 74   11/04/20 76   09/01/20 82              CONSTITUTIONAL: Well developed, well nourished, well groomed  No dysmorphic features  Eyes:  PERRLA, EOM normal      Neck:  Normal ROM, neck supple  HEENT:  Normocephalic atraumatic  No meningismus  Oropharynx is clear and moist  No oral mucosal lesions  Chest:  Respirations regular and unlabored  Cardiovascular:  Distal extremities warm without palpable edema or tenderness, no observed significant swelling  Musculoskeletal:  Full range of motion  Skin:  warm and dry   Psychiatric:  Normal behavior and appropriate affect        Neurological Examination:     Mental status/cognitive function: Orientated to time, place and person       Cranial Nerves: 2 to 12 intact    Motor Exam:    5/5 right upper extremity  5/5 left upper extremity  5/5 right lower extremity  5/5 left lower extremity    Sensory:   grossly intact light touch in all extremities  Reflexes:   2/4 right upper extremity  2/4 left upper extremity  2/4 right lower extremity  2/4 left lower extremity    Coordination:   - Finger to nose intact bilaterally  - No tremor noted    Gait: steady casual  gait, able to do tandem gait, romberg negative  Review of Systems:   Review of Systems        I have spent 60 minutes with Patient  today in which greater than 50% of this time was spent in counseling/coordination of care regarding Diagnostic results, Prognosis, Risks and benefits of tx options, Intructions for management, Patient and family education, Importance of tx compliance, Risk factor reductions, Impressions and Plan of care as above        Author:  Nadiya Rao PA-C 4/29/2022 8:08 AM
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
## 2022-04-29 NOTE — PATIENT INSTRUCTIONS
Preventative:  Continue Lexapro per psychiatry  Emgality 2 injection first month then 1 injection every 30 days  Continue Magnesium and Vitamin D    Abortive: At onset of migraines, take rizatriptan 10 mg  May repeat in 2 hours if needed    Limit of 3 a week or 9 month  Ok to use ibuprofen 400-600 mg but less than 3 doses a week    Referral to sleep medicine

## 2022-05-02 ENCOUNTER — OFFICE VISIT (OUTPATIENT)
Dept: OBGYN CLINIC | Facility: CLINIC | Age: 47
End: 2022-05-02
Payer: OTHER GOVERNMENT

## 2022-05-02 VITALS
DIASTOLIC BLOOD PRESSURE: 60 MMHG | SYSTOLIC BLOOD PRESSURE: 102 MMHG | WEIGHT: 162 LBS | HEIGHT: 65 IN | BODY MASS INDEX: 26.99 KG/M2

## 2022-05-02 DIAGNOSIS — F41.9 ANXIETY AND DEPRESSION: ICD-10-CM

## 2022-05-02 DIAGNOSIS — R92.2 DENSE BREAST TISSUE: ICD-10-CM

## 2022-05-02 DIAGNOSIS — N64.4 BREAST PAIN, LEFT: ICD-10-CM

## 2022-05-02 DIAGNOSIS — F32.A ANXIETY AND DEPRESSION: ICD-10-CM

## 2022-05-02 DIAGNOSIS — Z90.710 STATUS POST LAPAROSCOPIC HYSTERECTOMY: ICD-10-CM

## 2022-05-02 PROCEDURE — 99213 OFFICE O/P EST LOW 20 MIN: CPT | Performed by: OBSTETRICS & GYNECOLOGY

## 2022-05-02 NOTE — PROGRESS NOTES
Assessment/Plan:    Diagnoses and all orders for this visit:    Breast pain, left  -     Mammo diagnostic left w 3d & cad; Future  -     US breast screening bilateral complete (ABUS); Future    Dense breast tissue  -     Mammo diagnostic left w 3d & cad; Future  -     US breast screening bilateral complete (ABUS); Future    Status post laparoscopic hysterectomy    Anxiety and depression        Subjective: pain left breast     Patient ID: Rosalinda Bunn is a 48 y o  female  HPI    78-year-old female  2 para 2 recently developed pain and fullness and tension in her left breast especially  Beneath the left areola  She has not felt or palpated any breast lumps or masses  But does feel of painful sensation beneath her left nipple and areola  She is on low-dose progesterone for menopausal symptoms  She does have dense breast tissue on recent mammogram    diagnostic mammography for left breast ordered  Also ABUS ordered for both breast and later history of dense breast tissue  Follow-up within next 3 months with your annual exam     Review of Systems    Unchanged from last visit    Objective: no acute distress  /60   Ht 5' 5" (1 651 m)   Wt 73 5 kg (162 lb)   LMP  (LMP Unknown)   BMI 26 96 kg/m²      Physical Exam  Vitals reviewed  Exam conducted with a chaperone present  Constitutional:       Appearance: Normal appearance  She is normal weight  HENT:      Head: Normocephalic  Eyes:      Pupils: Pupils are equal, round, and reactive to light  Pulmonary:      Effort: Pulmonary effort is normal    Abdominal:      General: Abdomen is flat  Genitourinary:     Comments:  Pelvic exam deferred  Musculoskeletal:         General: Normal range of motion  Skin:     General: Skin is warm and dry  Neurological:      Mental Status: She is alert and oriented to person, place, and time     Psychiatric:         Mood and Affect: Mood normal          Behavior: Behavior normal          Thought Content: Thought content normal          Judgment: Judgment normal         Please note    In addition to the time spent discussing the findings and results of today's visit and exam, I spent approximately 20  minutes of face-to-face time with the patient, greater than 50% of which was spent in counseling and coordination of care for this patient

## 2022-05-03 ENCOUNTER — OFFICE VISIT (OUTPATIENT)
Dept: PSYCHIATRY | Facility: CLINIC | Age: 47
End: 2022-05-03
Payer: OTHER GOVERNMENT

## 2022-05-03 DIAGNOSIS — F41.9 ANXIETY: ICD-10-CM

## 2022-05-03 DIAGNOSIS — F33.0 DEPRESSION, MAJOR, RECURRENT, MILD (HCC): Primary | ICD-10-CM

## 2022-05-03 PROCEDURE — 99213 OFFICE O/P EST LOW 20 MIN: CPT

## 2022-05-03 RX ORDER — HYDROXYZINE HYDROCHLORIDE 25 MG/1
25 TABLET, FILM COATED ORAL 2 TIMES DAILY PRN
Qty: 60 TABLET | Refills: 1 | Status: SHIPPED | OUTPATIENT
Start: 2022-05-03 | End: 2022-07-08 | Stop reason: SDUPTHER

## 2022-05-03 RX ORDER — ESCITALOPRAM OXALATE 10 MG/1
10 TABLET ORAL DAILY
Qty: 30 TABLET | Refills: 1 | Status: SHIPPED | OUTPATIENT
Start: 2022-05-03 | End: 2022-07-08 | Stop reason: SDUPTHER

## 2022-05-03 NOTE — TELEPHONE ENCOUNTER
Called SURI wolf, harrison w/ [facility] and advised of the below  States that loading dose has been approved through 6/2/2022   Maintenance dose has been approved through 10/26/22  She will fax the approval letter to [phone]

## 2022-05-03 NOTE — TELEPHONE ENCOUNTER
Per [facility]-CaseId:[id number];Status:Approved; Review Type:Prior Auth; Coverage Start Date:03/30/2022; Coverage End Date:10/26/2022;;CaseId:[id number];Status:Approved; Review Type:Prior Auth; Coverage Start Date:03/30/2022; Coverage End Date:06/02/2022;

## 2022-05-03 NOTE — PSYCH
Regular Visit    Problem List Items Addressed This Visit        Other    Depression, major, recurrent, mild ([address] ) - Primary    Relevant Medications    hydrOXYzine HCL (ATARAX) 25 mg tablet    escitalopram (LEXAPRO) 10 mg tablet    Anxiety             Encounter provider SUSI Rae    Provider located at   [address] [address] [phone]    Recent Visits  No visits were found meeting these conditions  Showing recent visits within past 7 days and meeting all other requirements  Today's Visits  Date Type Provider Dept   05/03/22 Office Visit SUSI Rae  [facility]   Showing today's visits and meeting all other requirements  Future Appointments  No visits were found meeting these conditions    Showing future appointments within next 150 days and meeting all other requirements       HPI     Current Outpatient Medications   Medication Sig Dispense Refill    baclofen 10 mg tablet Take 10 mg by mouth 3 (three) times a day (Patient not taking: Reported on 4/29/2022 )      cetirizine (ZyrTEC Allergy) 10 mg tablet  (Patient not taking: Reported on 4/29/2022 )      cholecalciferol (VITAMIN D3) 1,000 units tablet Take 1 tablet (1,000 Units total) by mouth daily 30 tablet 3    EPINEPHrine (EPIPEN) 0 3 mg/0 3 mL SOAJ inject 0 3 milliliters intramuscularly immediately for ALLERGIC REACTION      escitalopram (LEXAPRO) 10 mg tablet Take 1 tablet (10 mg total) by mouth daily 30 tablet 1    Galcanezumab-gnlm 120 MG/ML SOAJ Inject 120 mg under the skin every 30 (thirty) days 1 mL 11    hydrOXYzine HCL (ATARAX) 25 mg tablet Take 1 tablet (25 mg total) by mouth 2 (two) times a day as needed for anxiety (anxiety or insomnia) 60 tablet 1    magnesium Oxide (MAG-OX) 400 mg TABS Take 400 mg by mouth in the morning      Multiple Vitamin (MULTIVITAMIN) capsule Take 1 capsule by mouth daily  Multiple Vitamins-Minerals (Multivitamin Adult, Minerals,) TABS Take 1 tablet by mouth (Patient not taking: Reported on 4/29/2022 )      norethindrone (MICRONOR) 0 35 MG tablet Take 1 tablet (0 35 mg total) by mouth daily 90 tablet 3    rizatriptan (MAXALT-MLT) 10 MG disintegrating tablet take 1 tablet by mouth AT MIGRAINE ONSET CAN REPEAT ONCE IN 2 HOURS  MAX 2 TO 3 DAYS A WEEK 12 tablet 3     No current facility-administered medications for this visit  Review of Systems        MEDICATION MANAGEMENT NOTE        [address]    Name and Date of Birth:  Shonda Tovar 48 y o  1975 MRN: [id number]    Date of Visit: May 3, 2022    Allergies   Allergen Reactions    Grass Pollen(K-O-R-T-Swt Channing) Shortness Of Breath     Fresh cut, breathing     Penicillins Hives    Codeine GI Intolerance    Shellfish-Derived Products - Food Allergy Other (See Comments)     Intolerance to scallops -reaction severe vomitting    Tetracyclines & Related GI Intolerance    Ancef [Cefazolin] Tachycardia     SUBJECTIVE:    Cecy is seen today for a follow up for Major Depressive Disorder and anxiety  She continues to do very well since the last visit  Cecy reports doing very well on the Lexapro 10 mg daily for anxiety and depression management, discusses how she initially struggled with nausea symptoms for the 1st few days of medication initiation but now all side effects have subsided and she feels stable medication  Denies all current side effects of Lexapro  Atarax 25 mg was given for breakthrough anxiety and insomnia symptoms during our previous visit, medication has been effective as patient reports improved sleep  She is happy with the progress she has made in such a short time and feels no need for further medication changes during today's visit    Continues to follow-up with Chantal Levin on monthly basis for individual therapy and reports therapy being very beneficial  She is goal oriented and optimistic for the future  Parents came down for [place], reports her job going well  Denies thoughts of self-harm and suicidal ideation  She denies any side effects from medications  PLAN:    Continue Lexapro 10 mg daily  Continue Atarax 25 mg as needed for anxiety or insomnia      Aware of 24 hour and weekend coverage for urgent situations accessed by calling [facility] main practice number  Continue psychotherapy with therapist    Diagnoses and all orders for this visit:    Depression, major, recurrent, mild (HCC)  -     hydrOXYzine HCL (ATARAX) 25 mg tablet; Take 1 tablet (25 mg total) by mouth 2 (two) times a day as needed for anxiety (anxiety or insomnia)  -     escitalopram (LEXAPRO) 10 mg tablet;  Take 1 tablet (10 mg total) by mouth daily    Anxiety        Current Outpatient Medications on File Prior to Visit   Medication Sig Dispense Refill    baclofen 10 mg tablet Take 10 mg by mouth 3 (three) times a day (Patient not taking: Reported on 4/29/2022 )      cetirizine (ZyrTEC Allergy) 10 mg tablet  (Patient not taking: Reported on 4/29/2022 )      cholecalciferol (VITAMIN D3) 1,000 units tablet Take 1 tablet (1,000 Units total) by mouth daily 30 tablet 3    EPINEPHrine (EPIPEN) 0 3 mg/0 3 mL SOAJ inject 0 3 milliliters intramuscularly immediately for ALLERGIC REACTION      Galcanezumab-gnlm 120 MG/ML SOAJ Inject 120 mg under the skin every 30 (thirty) days 1 mL 11    magnesium Oxide (MAG-OX) 400 mg TABS Take 400 mg by mouth in the morning      Multiple Vitamin (MULTIVITAMIN) capsule Take 1 capsule by mouth daily      Multiple Vitamins-Minerals (Multivitamin Adult, Minerals,) TABS Take 1 tablet by mouth (Patient not taking: Reported on 4/29/2022 )      norethindrone (MICRONOR) 0 35 MG tablet Take 1 tablet (0 35 mg total) by mouth daily 90 tablet 3    rizatriptan (MAXALT-MLT) 10 MG disintegrating tablet take 1 tablet by mouth AT MIGRAINE ONSET CAN REPEAT ONCE IN 2 HOURS  MAX 2 TO 3 DAYS A WEEK 12 tablet 3    [DISCONTINUED] escitalopram (LEXAPRO) 10 mg tablet Take 1 tablet (10 mg total) by mouth daily 30 tablet 1    [DISCONTINUED] hydrOXYzine HCL (ATARAX) 25 mg tablet Take 1 tablet (25 mg total) by mouth 2 (two) times a day as needed for anxiety (anxiety or insomnia) 60 tablet 0     No current facility-administered medications on file prior to visit  HPI ROS Appetite Changes and Sleep:     She reports normal sleep, adequate appetite, adequate energy level   Denies homicidal ideation, denies suicidal ideation    Review Of Systems:      HPI ROS:               Medication Side Effects:  denies    Depression (10 worst): 2/10    Anxiety (10 worst): 2/10    Safety concerns (SI, HI, etc): Denies si and hi    Sleep: Much improved Atarax    Energy: Fair    Appetite: Good    Weight Change: Denies        Mental Status Evaluation:    Appearance Adequate hygiene and grooming   Behavior cooperative   Mood euthymic  Depression Scale - 2 of 10 (0 = No depression)  Anxiety Scale - 2 of 10 (0 = No anxiety)   Speech Normal rate and volume   Affect appropriate   Thought Processes Goal directed and coherent   Thought Content Does not verbalize delusional material   Associations Tightly connected   Perceptual Disturbances Denies hallucinations and does not appear to be responding to internal stimuli   Risk Potential Suicidal/Homicidal Ideation - No evidence of suicidal or homicidal ideation and patient does not verbalize suicidal or homicidal ideation  Risk of Violence - No evidence of risk for violence found on assessment  Risk of Self Mutilation - No evidence of risk for self mutilation found on assessment   Orientation oriented to person, place, time/date and situation   Memory recent and remote memory grossly intact   Consciousness alert and awake   Attention/Concentration attention span and concentration are age appropriate   Insight intact Judgement intact   Muscle Strength and Gait normal muscle strength and normal muscle tone, normal gait/station and normal balance   Motor Activity no abnormal movements   Language no difficulty naming common objects, no difficulty repeating a phrase, no difficulty writing a sentence   Fund of Knowledge adequate knowledge of current events  adequate fund of knowledge regarding past history  adequate fund of knowledge regarding vocabulary      Past Psychiatric History Update:     Inpatient Psychiatric Admission Since Last Encounter:   no  Changes to Outpatient Psychiatric Treatment Team:    no  Suicide Attempt Or Self Mutilation Since Last Encounter:   no  Incidence of Violent Behavior Since Last Encounter:   no    Traumatic History Update:     New Onset of Abuse Since Last Encounter:   no  Traumatic Events Since Last Encounter:   no    Past Medical History:    Past Medical History:   Diagnosis Date    Anxiety     Depression     Food allergy     scallops    Gall stone     lap ari today 5/8/2019    History of migraine     Motion sickness     PONV (postoperative nausea and vomiting)     Wears glasses      Past Medical History Pertinent Negatives:   Diagnosis Date Noted    History of transfusion 04/29/2019     Past Surgical History:   Procedure Laterality Date    BREAST CYST ASPIRATION Left     benign-age 13-14    COLONOSCOPY      ENDOMETRIAL ABLATION      HYSTERECTOMY  2015    Partial    INJECTION ANESTHETIC AGENT TO CERVICAL PLEXUS      KNEE ARTHROSCOPY Left     OOPHORECTOMY Right 2016    OR LAP,CHOLECYSTECTOMY N/A 5/8/2019    Procedure: LAP ARI w/ IOC;  Surgeon: Niki Jeronimo MD;  Location: [facility];  Service: General    OR LAP,DIAGNOSTIC ABDOMEN N/A 9/16/2016    Procedure: LAPAROSCOPY DIAGNOSTIC;  Surgeon: Humphrey Rich DO;  [facility];  Service: Gynecology    OR LAP,DIAGNOSTIC ABDOMEN N/A 5/8/2019    Procedure: LAPAROSCOPY DIAGNOSTIC;  Surgeon: Niki Jeronimo MD; Location[facility];  Service: General    VA LAP,RMV  ADNEXAL STRUCTURE Right 9/16/2016    Procedure:  OOPHRECTOMY;  Surgeon: Ravindra Mccullough DO;  Location: [facility];  Service: Gynecology    REFRACTIVE SURGERY Bilateral     TONSILLECTOMY      WISDOM TOOTH EXTRACTION       Allergies   Allergen Reactions    Grass Pollen(K-O-R-T-Swt Channing) Shortness Of Breath     Fresh cut, breathing     Penicillins Hives    Codeine GI Intolerance    Shellfish-Derived Products - Food Allergy Other (See Comments)     Intolerance to scallops -reaction severe vomitting    Tetracyclines & Related GI Intolerance    Ancef [Cefazolin] Tachycardia     Substance Abuse History:    Social History     Substance and Sexual Activity   Alcohol Use Yes    Alcohol/week: 2 0 standard drinks    Types: 1 Glasses of wine, 1 Cans of beer per week    Comment: 2 weekly     Social History     Substance and Sexual Activity   Drug Use No     Social History:    Social History     Socioeconomic History    Marital status: /Civil Union     Spouse name: Not on file    Number of children: Not on file    Years of education: Not on file    Highest education level: Not on file   Occupational History    Not on file   Tobacco Use    Smoking status: Never Smoker    Smokeless tobacco: Never Used   Vaping Use    Vaping Use: Never used   Substance and Sexual Activity    Alcohol use:  Yes     Alcohol/week: 2 0 standard drinks     Types: 1 Glasses of wine, 1 Cans of beer per week     Comment: 2 weekly    Drug use: No    Sexual activity: Not on file   Other Topics Concern    Not on file   Social History Narrative    2 children    Caffeine use     Social Determinants of Health     Financial Resource Strain: Not on file   Food Insecurity: Not on file   Transportation Needs: Not on file   Physical Activity: Not on file   Stress: Not on file   Social Connections: Not on file   Intimate Partner Violence: Not on file   Housing Stability: Not on file Family Psychiatric History:     Family History   Adopted: Yes   Problem Relation Age of Onset    Alcohol abuse Father     Substance Abuse Father     Diabetes Sister     Cancer Maternal Grandmother     Alcohol abuse Maternal Grandfather     Heart disease Maternal Grandfather     Hypertension Maternal Grandfather     Cancer Maternal Aunt     No Known Problems Daughter     No Known Problems Sister     Breast cancer Neg Hx      History Review: The following portions of the patient's history were reviewed and updated as appropriate: allergies, current medications, past family history, past medical history, past social history, past surgical history and problem list     OBJECTIVE:     Vital signs in last 24 hours: There were no vitals filed for this visit  Laboratory Results: I have personally reviewed all pertinent laboratory/tests results  Suicide/Homicide Risk Assessment:    Risk of Harm to Self:  Protective Factors: no current suicidal ideation, access to mental health treatment, being a parent, being , compliant with medications, compliant with mental health treatment, connection to community, connection to own children  Based on today's assessment, Cecy presents the following risk of harm to self: minimal    Risk of Harm to Others:  Based on today's assessment, Cecy presents the following risk of harm to others: none    The following interventions are recommended: therapy appointment in 2 weeks    Medications Risks/Benefits:      Risks, Benefits And Possible Side Effects Of Medications:    Discussed risks and benefits of treatment with patient including risk of suicidality, serotonin syndrome, increased QTc interval and SIADH related to treatment with antidepressants;  Risk of induction of manic symptoms in certain patient populations     Controlled Medication Discussion:     Not applicable    Treatment Plan:    Due for update/Updated:   no  Last treatment plan done 4/5/22 by clotilde artem cruz  Treatment Plan due on 10/5/22      SUSI Ferreira 05/03/22

## 2022-05-05 NOTE — TELEPHONE ENCOUNTER
Pharm called regarding PA for loading dose, states that it is still rejecting  Made her aware of below    She will contact insurance and let us know if anything else is needed

## 2022-05-05 NOTE — TELEPHONE ENCOUNTER
Pharm called back and states that she   Called jah and the person she spoke to did not see any PA approval on file  She called jah at [phone]    [id number]SOJIRT:[id number]; Review Type:Prior Auth; Coverage Start Date:03/30/2022; Coverage End Date:10/26/2022;;CaseId:[id number];Status:Approved; Review Type:Prior Auth; Coverage Start Date:03/30/2022; Coverage End Date:06/02/2022;     Called jah at [phone] and spoke to Silas  She sees both approvals on file    She spoke to a supervisor and the   Cherelle Clark was updated and getting a paid claim    Called pharm, she was able to get a paid claim now

## 2022-05-20 ENCOUNTER — SOCIAL WORK (OUTPATIENT)
Dept: BEHAVIORAL/MENTAL HEALTH CLINIC | Facility: CLINIC | Age: 47
End: 2022-05-20
Payer: OTHER GOVERNMENT

## 2022-05-20 DIAGNOSIS — F32.0 CURRENT MILD EPISODE OF MAJOR DEPRESSIVE DISORDER, UNSPECIFIED WHETHER RECURRENT (HCC): Primary | ICD-10-CM

## 2022-05-20 DIAGNOSIS — F41.9 ANXIETY: ICD-10-CM

## 2022-05-20 PROCEDURE — 90832 PSYTX W PT 30 MINUTES: CPT | Performed by: SOCIAL WORKER

## 2022-05-20 NOTE — PSYCH
This note was not shared with the patient due to this is a psychotherapy note      Psychotherapy Provided: Individual Psychotherapy 30 minutes     Length of time in session: 30 minutes, follow up in 6 week    Goals addressed in session: Goal 1     Pain:      none    0    Current suicide risk : Low     Met with Cecy  Psychiatry note reviewed prior to session today  Session focused on discussing how Ivett Lewis is coping, and how she is remaining clear about using her coping skills  Cecy reports doing fairly well since her last session  She is using behavioral activation tools and anxiety reduction tools to improve mood; sharing successes and challenges  She continues to report a decrease in her anxiety as evidenced by being able to let things go easier  She feels her anxiety is no longer building throughout the day  She expressed her frustrations with a coworker, sharing how she's able to manage her coworkers behaviors better with medication management the tools learned in therapy  Ivett Lewis is looking forward to leaving for [place] tomorrow to watch her grandchildren for several days  Her and her  have another trip to [place] planned at the end of next month  Cecy shared that her  has begun [address] treatment  She shared her thoughts and feelings, stating they are both hopeful that it will help improve his depression  Overall, Cecy reports doing well, expressing today was mostly a 'check-in' appt  Continue support x 6 weeks        PHQ-2/9 Depression Screening    Little interest or pleasure in doing things: 1 - several days  Feeling down, depressed, or hopeless: 1 - several days  Trouble falling or staying asleep, or sleeping too much: 0 - not at all  Feeling tired or having little energy: 1 - several days  Poor appetite or overeatin - not at all  Feeling bad about yourself - or that you are a failure or have let yourself or your family down: 0 - not at all  Trouble concentrating on things, such as reading the newspaper or watching television: 0 - not at all  Moving or speaking so slowly that other people could have noticed  Or the opposite - being so fidgety or restless that you have been moving around a lot more than usual: 0 - not at all  Thoughts that you would be better off dead, or of hurting yourself in some way: 0 - not at all  PHQ-9 Score: 3   PHQ-9 Interpretation: No or Minimal depression            Mental status:  Appearance calm and cooperative , adequate hygiene and grooming and good eye contact    Mood euthymic   Affect Affect appropriate, mood congruent   Speech a normal rate and volume   Thought Processes coherent/organized and goal-directed   Hallucinations no hallucinations present    Thought Content no delusions, help seeking   Abnormal Thoughts no suicidal thoughts  and no homicidal thoughts    Orientation  oriented to person and place and time   Remote Memory short term memory intact and long term memory intact   Attention Span concentration intact   Intellect Appears to be of Average Intelligence   Fund of Knowledge displays adequate knowledge of current events   Insight Insight intact   Judgement judgment was intact   Muscle Strength Muscle strength and tone were normal and Normal gait    Language no difficulty naming common objects   Pain none   Pain Scale 0         Behavioral Health Treatment Plan [facility]: Diagnosis and Treatment Plan explained to Ang Henderson relates understanding diagnosis and is agreeable to Treatment Plan   Yes

## 2022-05-26 ENCOUNTER — HOSPITAL ENCOUNTER (OUTPATIENT)
Dept: ULTRASOUND IMAGING | Facility: CLINIC | Age: 47
Discharge: HOME | End: 2022-05-26
Payer: OTHER GOVERNMENT

## 2022-05-26 ENCOUNTER — HOSPITAL ENCOUNTER (OUTPATIENT)
Dept: MAMMOGRAPHY | Facility: CLINIC | Age: 47
Discharge: HOME | End: 2022-05-26
Payer: OTHER GOVERNMENT

## 2022-05-26 VITALS — HEIGHT: 65 IN | BODY MASS INDEX: 26.99 KG/M2 | WEIGHT: 162 LBS

## 2022-05-26 DIAGNOSIS — N64.4 BREAST PAIN, LEFT: ICD-10-CM

## 2022-05-26 DIAGNOSIS — R92.2 DENSE BREAST TISSUE: ICD-10-CM

## 2022-05-26 PROCEDURE — 76642 ULTRASOUND BREAST LIMITED: CPT

## 2022-05-26 PROCEDURE — 77065 DX MAMMO INCL CAD UNI: CPT

## 2022-05-26 PROCEDURE — G0279 TOMOSYNTHESIS, MAMMO: HCPCS

## 2022-06-13 ENCOUNTER — HOSPITAL ENCOUNTER (OUTPATIENT)
Dept: RADIOLOGY | Facility: HOSPITAL | Age: 47
Discharge: HOME | End: 2022-06-13
Payer: OTHER GOVERNMENT

## 2022-06-13 ENCOUNTER — OFFICE VISIT (OUTPATIENT)
Dept: FAMILY MEDICINE CLINIC | Facility: CLINIC | Age: 47
End: 2022-06-13
Payer: OTHER GOVERNMENT

## 2022-06-13 VITALS
OXYGEN SATURATION: 97 % | SYSTOLIC BLOOD PRESSURE: 114 MMHG | DIASTOLIC BLOOD PRESSURE: 72 MMHG | HEIGHT: 65 IN | HEART RATE: 102 BPM | TEMPERATURE: 98.7 F | WEIGHT: 158 LBS | BODY MASS INDEX: 26.33 KG/M2

## 2022-06-13 DIAGNOSIS — M79.672 FOOT PAIN, BILATERAL: ICD-10-CM

## 2022-06-13 DIAGNOSIS — Z13.1 SCREENING FOR DIABETES MELLITUS: ICD-10-CM

## 2022-06-13 DIAGNOSIS — Z00.00 ANNUAL PHYSICAL EXAM: Primary | ICD-10-CM

## 2022-06-13 DIAGNOSIS — Z13.6 SCREENING FOR CARDIOVASCULAR CONDITION: ICD-10-CM

## 2022-06-13 DIAGNOSIS — M79.671 FOOT PAIN, BILATERAL: ICD-10-CM

## 2022-06-13 PROBLEM — G43.009 MIGRAINE WITHOUT AURA AND WITHOUT STATUS MIGRAINOSUS, NOT INTRACTABLE: Status: RESOLVED | Noted: 2020-09-01 | Resolved: 2022-06-13

## 2022-06-13 PROBLEM — T78.1XXA ALLERGIC REACTION TO TREE NUT: Status: ACTIVE | Noted: 2020-10-01

## 2022-06-13 PROBLEM — Z90.710 S/P HYSTERECTOMY: Status: ACTIVE | Noted: 2022-06-13

## 2022-06-13 PROCEDURE — 99396 PREV VISIT EST AGE 40-64: CPT | Performed by: PHYSICIAN ASSISTANT

## 2022-06-13 PROCEDURE — 73630 X-RAY EXAM OF FOOT: CPT

## 2022-06-13 NOTE — PATIENT INSTRUCTIONS
Wellness Visit for Adults   AMBULATORY CARE:   A wellness visit  is when you see your healthcare provider to get screened for health problems  Your healthcare provider will also give you advice on how to stay healthy  Write down your questions so you remember to ask them  Ask your healthcare provider how often you should have a wellness visit  What happens at a wellness visit:  Your healthcare provider will ask about your health, and your family history of health problems  This includes high blood pressure, heart disease, and cancer  He or she will ask if you have symptoms that concern you, if you smoke, and about your mood  You may also be asked about your intake of medicines, supplements, food, and alcohol  Any of the following may be done:  · Your weight  will be checked  Your height may also be checked so your body mass index (BMI) can be calculated  Your BMI shows if you are at a healthy weight  · Your blood pressure  and heart rate will be checked  Your temperature may also be checked  · Blood and urine tests  may be done  Blood tests may be done to check your cholesterol levels  Abnormal cholesterol levels increase your risk for heart disease and stroke  You may also need a blood or urine test to check for diabetes if you are at increased risk  Urine tests may be done to look for signs of an infection or kidney disease  · A physical exam  includes checking your heartbeat and lungs with a stethoscope  Your healthcare provider may also check your skin to look for sun damage  · Screening tests  may be recommended  A screening test is done to check for diseases that may not cause symptoms  The screening tests you may need depend on your age, gender, family history, and lifestyle habits  For example, colorectal screening may be recommended if you are 48years old or older  Screening tests you need if you are a woman:   · A Pap smear  is used to screen for cervical cancer   Pap smears are usually done every 3 to 5 years depending on your age  You may need them more often if you have had abnormal Pap smear test results in the past  Ask your healthcare provider how often you should have a Pap smear  · A mammogram  is an x-ray of your breasts to screen for breast cancer  Experts recommend mammograms every 2 years starting at age 48 years  You may need a mammogram at age 52 years or younger if you have an increased risk for breast cancer  Talk to your healthcare provider about when you should start having mammograms and how often you need them  Vaccines you may need:   · Get an influenza vaccine  every year  The influenza vaccine protects you from the flu  Several types of viruses cause the flu  The viruses change over time, so new vaccines are made each year  · Get a tetanus-diphtheria (Td) booster vaccine  every 10 years  This vaccine protects you against tetanus and diphtheria  Tetanus is a severe infection that may cause painful muscle spasms and lockjaw  Diphtheria is a severe bacterial infection that causes a thick covering in the back of your mouth and throat  · Get a human papillomavirus (HPV) vaccine  if you are female and aged 23 to 32 or male 23 to 24 and never received it  This vaccine protects you from HPV infection  HPV is the most common infection spread by sexual contact  HPV may also cause vaginal, penile, and anal cancers  · Get a pneumococcal vaccine  if you are aged 72 years or older  The pneumococcal vaccine is an injection given to protect you from pneumococcal disease  Pneumococcal disease is an infection caused by pneumococcal bacteria  The infection may cause pneumonia, meningitis, or an ear infection  · Get a shingles vaccine  if you are 60 or older, even if you have had shingles before  The shingles vaccine is an injection to protect you from the varicella-zoster virus  This is the same virus that causes chickenpox   Shingles is a painful rash that develops in people who had chickenpox or have been exposed to the virus  How to eat healthy:  My Plate is a model for planning healthy meals  It shows the types and amounts of foods that should go on your plate  Fruits and vegetables make up about half of your plate, and grains and protein make up the other half  A serving of dairy is included on the side of your plate  The amount of calories and serving sizes you need depends on your age, gender, weight, and height  Examples of healthy foods are listed below:  · Eat a variety of vegetables  such as dark green, red, and orange vegetables  You can also include canned vegetables low in sodium (salt) and frozen vegetables without added butter or sauces  · Eat a variety of fresh fruits , canned fruit in 100% juice, frozen fruit, and dried fruit  · Include whole grains  At least half of the grains you eat should be whole grains  Examples include whole-wheat bread, wheat pasta, brown rice, and whole-grain cereals such as oatmeal     · Eat a variety of protein foods such as seafood (fish and shellfish), lean meat, and poultry without skin (turkey and chicken)  Examples of lean meats include pork leg, shoulder, or tenderloin, and beef round, sirloin, tenderloin, and extra lean ground beef  Other protein foods include eggs and egg substitutes, beans, peas, soy products, nuts, and seeds  · Choose low-fat dairy products such as skim or 1% milk or low-fat yogurt, cheese, and cottage cheese  · Limit unhealthy fats  such as butter, hard margarine, and shortening  Exercise:  Exercise at least 30 minutes per day on most days of the week  Some examples of exercise include walking, biking, dancing, and swimming  You can also fit in more physical activity by taking the stairs instead of the elevator or parking farther away from stores  Include muscle strengthening activities 2 days each week  Regular exercise provides many health benefits   It helps you manage your weight, and decreases your risk for type 2 diabetes, heart disease, stroke, and high blood pressure  Exercise can also help improve your mood  Ask your healthcare provider about the best exercise plan for you  General health and safety guidelines:   · Do not smoke  Nicotine and other chemicals in cigarettes and cigars can cause lung damage  Ask your healthcare provider for information if you currently smoke and need help to quit  E-cigarettes or smokeless tobacco still contain nicotine  Talk to your healthcare provider before you use these products  · Limit alcohol  A drink of alcohol is 12 ounces of beer, 5 ounces of wine, or 1½ ounces of liquor  · Lose weight, if needed  Being overweight increases your risk of certain health conditions  These include heart disease, high blood pressure, type 2 diabetes, and certain types of cancer  · Protect your skin  Do not sunbathe or use tanning beds  Use sunscreen with a SPF 15 or higher  Apply sunscreen at least 15 minutes before you go outside  Reapply sunscreen every 2 hours  Wear protective clothing, hats, and sunglasses when you are outside  · Drive safely  Always wear your seatbelt  Make sure everyone in your car wears a seatbelt  A seatbelt can save your life if you are in an accident  Do not use your cell phone when you are driving  This could distract you and cause an accident  Pull over if you need to make a call or send a text message  · Practice safe sex  Use latex condoms if are sexually active and have more than one partner  Your healthcare provider may recommend screening tests for sexually transmitted infections (STIs)  · Wear helmets, lifejackets, and protective gear  Always wear a helmet when you ride a bike or motorcycle, go skiing, or play sports that could cause a head injury  Wear protective equipment when you play sports  Wear a lifejacket when you are on a boat or doing water sports      © Copyright [employer] 2022 Information is for End User's use only and may not be sold, redistributed or otherwise used for commercial purposes  All illustrations and images included in CareNotes® are the copyrighted property of [employer]  or [employer]  The above information is an  only  It is not intended as medical advice for individual conditions or treatments  Talk to your doctor, nurse or pharmacist before following any medical regimen to see if it is safe and effective for you  Cholesterol and Your Health   AMBULATORY CARE:   Cholesterol  is a waxy, fat-like substance  Your body uses cholesterol to make hormones and new cells, and to protect nerves  Cholesterol is made by your body  It also comes from certain foods you eat, such as meat and dairy products  Your healthcare provider can help you set goals for your cholesterol levels  He or she can help you create a plan to meet your goals  Cholesterol level goals: Your cholesterol level goals depend on your risk for heart disease, your age, and your other health conditions  The following are general guidelines:  · Total cholesterol  includes low-density lipoprotein (LDL), high-density lipoprotein (HDL), and triglyceride levels  The total cholesterol level should be lower than 200 mg/dL and is best at about 150 mg/dL  · LDL cholesterol  is called bad cholesterol  because it forms plaque in your arteries  As plaque builds up, your arteries become narrow, and less blood flows through  When plaque decreases blood flow to your heart, you may have chest pain  If plaque completely blocks an artery that brings blood to your heart, you may have a heart attack  Plaque can break off and form blood clots  Blood clots may block arteries in your brain and cause a stroke  The level should be less than 130 mg/dL and is best at about 100 mg/dL  · HDL cholesterol  is called good cholesterol  because it helps remove LDL cholesterol from your arteries   It does this by attaching to LDL cholesterol and carrying it to your liver  Your liver breaks down LDL cholesterol so your body can get rid of it  High levels of HDL cholesterol can help prevent a heart attack and stroke  Low levels of HDL cholesterol can increase your risk for heart disease, heart attack, and stroke  The level should be 60 mg/dL or higher  · Triglycerides  are a type of fat that store energy from foods you eat  High levels of triglycerides also cause plaque buildup  This can increase your risk for a heart attack or stroke  If your triglyceride level is high, your LDL cholesterol level may also be high  The level should be less than 150 mg/dL  Any of the following can increase your risk for high cholesterol:   · Smoking cigarettes    · Being overweight or obese, or not getting enough exercise    · Drinking large amounts of alcohol    · A medical condition such as hypertension (high blood pressure) or diabetes    · Certain genes passed from your parents to you    · Age older than 65 years    What you need to know about having your cholesterol levels checked: Adults 21to 39years of age should have their cholesterol levels checked every 4 to 6 years  Adults 45 years or older should have their cholesterol checked every 1 to 2 years  You may need your cholesterol checked more often, or at a younger age, if you have risk factors for heart disease  You may also need to have your cholesterol checked more often if you have other health conditions, such as diabetes  Blood tests are used to check cholesterol levels  Blood tests measure your levels of triglycerides, LDL cholesterol, and HDL cholesterol  How healthy fats affect your cholesterol levels:  Healthy fats, also called unsaturated fats, help lower LDL cholesterol and triglyceride levels  Healthy fats include the following:  · Monounsaturated fats  are found in foods such as olive oil, canola oil, avocado, nuts, and olives      · Polyunsaturated fats,  such as omega 3 fats, are found in fish, such as salmon, trout, and tuna  They can also be found in plant foods such as flaxseed, walnuts, and soybeans  How unhealthy fats affect your cholesterol levels:  Unhealthy fats increase LDL cholesterol and triglyceride levels  They are found in foods high in cholesterol, saturated fat, and trans fat:  · Cholesterol  is found in eggs, dairy, and meat  · Saturated fat  is found in butter, cheese, ice cream, whole milk, and coconut oil  Saturated fat is also found in meat, such as sausage, hot dogs, and bologna  · Trans fat  is found in liquid oils and is used in fried and baked foods  Foods that contain trans fats include chips, crackers, muffins, sweet rolls, microwave popcorn, and cookies  Treatment  for high cholesterol will also decrease your risk of heart disease, heart attack, and stroke  Treatment may include any of the following:  · Lifestyle changes  may include food, exercise, weight loss, and quitting smoking  You may also need to decrease the amount of alcohol you drink  Your healthcare provider will want you to start with lifestyle changes  Other treatment may be added if lifestyle changes are not enough  Your healthcare provider may recommend you work with a team to manage hyperlipidemia  The team may include medical experts such as a dietitian, an exercise or physical therapist, and a behavior therapist  Your family members may be included in helping you create lifestyle changes  · Medicines  may be given to lower your LDL cholesterol, triglyceride levels, or total cholesterol level  You may need medicines to lower your cholesterol if any of the following is true:    ? You have a history of stroke, TIA, unstable angina, or a heart attack  ? Your LDL cholesterol level is 190 mg/dL or higher  ? You are age 36 to 76 years, have diabetes or heart disease risk factors, and your LDL cholesterol is 70 mg/dL or higher      · Supplements  include fish oil, red yeast rice, and garlic  Fish oil may help lower your triglyceride and LDL cholesterol levels  It may also increase your HDL cholesterol level  Red yeast rice may help decrease your total cholesterol level and LDL cholesterol level  Garlic may help lower your total cholesterol level  Do not take any supplements without talking to your healthcare provider  Food changes you can make to lower your cholesterol levels:  A dietitian can help you create a healthy eating plan  He or she can show you how to read food labels and choose foods low in saturated fat, trans fats, and cholesterol  · Decrease the total amount of fat you eat  Choose lean meats, fat-free or 1% fat milk, and low-fat dairy products, such as yogurt and cheese  Try to limit or avoid red meats  Limit or do not eat fried foods or baked goods, such as cookies  · Replace unhealthy fats with healthy fats  Cook foods in olive oil or canola oil  Choose soft margarines that are low in saturated fat and trans fat  Seeds, nuts, and avocados are other examples of healthy fats  · Eat foods with omega-3 fats  Examples include salmon, tuna, mackerel, walnuts, and flaxseed  Eat fish 2 times per week  Pregnant women should not eat fish that have high levels of mercury, such as shark, swordfish, and ebony mackerel  · Increase the amount of high-fiber foods you eat  High-fiber foods can help lower your LDL cholesterol  Aim to get between 20 and 30 grams of fiber each day  Fruits and vegetables are high in fiber  Eat at least 5 servings each day  Other high-fiber foods are whole-grain or whole-wheat breads, pastas, or cereals, and brown rice  Eat 3 ounces of whole-grain foods each day  Increase fiber slowly  You may have abdominal discomfort, bloating, and gas if you add fiber to your diet too quickly  · Eat healthy protein foods  Examples include low-fat dairy products, skinless chicken and turkey, fish, and nuts      · Limit foods and drinks that are high in sugar  Your dietitian or healthcare provider can help you create daily limits for high-sugar foods and drinks  The limit may be lower if you have diabetes or another health condition  Limits can also help you lose weight if needed  Lifestyle changes you can make to lower your cholesterol levels:   · Maintain a healthy weight  Ask your healthcare provider what a healthy weight is for you  Ask him or her to help you create a weight loss plan if needed  Weight loss can decrease your total cholesterol and triglyceride levels  Weight loss may also help keep your blood pressure at a healthy level  · Be physically active throughout the day  Physical activity, such as exercise, can help lower your total cholesterol level and maintain a healthy weight  Physical activity can also help increase your HDL cholesterol level  Work with your healthcare provider to create an program that is right for you  Get at least 30 to 40 minutes of moderate physical activity most days of the week  Examples of exercise include brisk walking, swimming, or biking  Also include strength training at least 2 times each week  Your healthcare providers can help you create a physical activity plan  · Do not smoke  Nicotine and other chemicals in cigarettes and cigars can raise your cholesterol levels  Ask your healthcare provider for information if you currently smoke and need help to quit  E-cigarettes or smokeless tobacco still contain nicotine  Talk to your healthcare provider before you use these products  · Limit or do not drink alcohol  Alcohol can increase your triglyceride levels  Ask your healthcare provider before you drink alcohol  Ask how much is okay for you to drink in 24 hours or 1 week  Follow up with your doctor as directed:  Write down your questions so you remember to ask them during your visits    © Copyright [employer] 2022 Information is for End User's use only and may not be sold, redistributed or otherwise used for commercial purposes  All illustrations and images included in CareNotes® are the copyrighted property of [employer]  or [employer]  The above information is an  only  It is not intended as medical advice for individual conditions or treatments  Talk to your doctor, nurse or pharmacist before following any medical regimen to see if it is safe and effective for you

## 2022-06-13 NOTE — PROGRESS NOTES
[facility] [address] [facility]    NAME: Zaida Jacob  AGE: 48 y o  SEX: female  : 1975     DATE: 2022     Assessment and Plan:     Problem List Items Addressed This Visit        Other    Annual physical exam - Primary      Other Visit Diagnoses     Screening for diabetes mellitus        Relevant Orders    Comprehensive metabolic panel    Screening for cardiovascular condition        Relevant Orders    Lipid panel    Foot pain, bilateral        Relevant Orders    XR foot 3+ vw left    XR foot 3+ vw right          Immunizations and preventive care screenings were discussed with patient today  Appropriate education was printed on patient's after visit summary  Counseling:  Dental Health: discussed importance of regular tooth brushing, flossing, and dental visits  Injury prevention: discussed safety/seat belts, safety helmets, smoke detectors, carbon dioxide detectors, and smoking near bedding or upholstery  · Exercise: the importance of regular exercise/physical activity was discussed  Recommend exercise 3-5 times per week for at least 30 minutes  BMI Counseling: Body mass index is 26 29 kg/m²  The BMI is above normal  Nutrition recommendations include encouraging healthy choices of fruits and vegetables, consuming healthier snacks, limiting drinks that contain sugar, moderation in carbohydrate intake, increasing intake of lean protein, reducing intake of saturated and trans fat and reducing intake of cholesterol  Exercise recommendations include moderate physical activity 150 minutes/week  No pharmacotherapy was ordered  Rationale for BMI follow-up plan is due to patient being overweight or obese  No follow-ups on file  Chief Complaint:     Chief Complaint   Patient presents with    Annual Exam     PE Due   Foot Pain     Right foot pain which onset a while ago per pt   Pt states this pain has worsened over the past month  History of Present Illness:     Adult Annual Physical   Patient here for a comprehensive physical exam  The patient reports problems - foot pain  Diet and Physical Activity  · Diet/Nutrition: well balanced diet  · Exercise: strength training exercises and 3-4 times a week on average  Depression Screening  PHQ-2/9 Depression Screening    Little interest or pleasure in doing things: 0 - not at all  Feeling down, depressed, or hopeless: 0 - not at all  Trouble falling or staying asleep, or sleeping too much: 0 - not at all  Feeling tired or having little energy: 0 - not at all  Poor appetite or overeatin - not at all  Feeling bad about yourself - or that you are a failure or have let yourself or your family down: 0 - not at all  Trouble concentrating on things, such as reading the newspaper or watching television: 0 - not at all  Moving or speaking so slowly that other people could have noticed  Or the opposite - being so fidgety or restless that you have been moving around a lot more than usual: 0 - not at all  Thoughts that you would be better off dead, or of hurting yourself in some way: 0 - not at all  PHQ-9 Score: 0   PHQ-9 Interpretation: No or Minimal depression        General Health  · Sleep: sleeps well  · Hearing: normal - bilateral   · Vision: most recent eye exam >1 year ago and wears glasses  · Dental: no dental visits for >1 year  /GYN Health  · Patient is: s/p hyster  · Last menstrual period: s/p hyster  · Contraceptive method: s/p hyster  Review of Systems:     Review of Systems   Constitutional: Negative for appetite change, fatigue, fever and unexpected weight change  HENT: Negative for dental problem, ear pain, hearing loss, mouth sores, nosebleeds, rhinorrhea, tinnitus, trouble swallowing and voice change  Eyes: Negative for photophobia, pain, discharge and visual disturbance     Respiratory: Negative for cough, chest tightness, shortness of breath and wheezing  Cardiovascular: Negative for chest pain and palpitations  Gastrointestinal: Negative for abdominal pain, blood in stool, constipation, diarrhea, nausea, rectal pain and vomiting  Endocrine: Negative for cold intolerance, polydipsia, polyphagia and polyuria  Genitourinary: Negative for decreased urine volume, difficulty urinating, dysuria, frequency, hematuria and urgency  Musculoskeletal: Negative for arthralgias, back pain, gait problem, joint swelling, myalgias, neck pain and neck stiffness  Bilateral foot pain   Skin: Negative for color change and rash  Allergic/Immunologic: Negative for environmental allergies, food allergies and immunocompromised state  Neurological: Negative for dizziness, seizures, speech difficulty, light-headedness and headaches  Hematological: Negative for adenopathy  Does not bruise/bleed easily  Psychiatric/Behavioral: Negative for behavioral problems, confusion, decreased concentration, self-injury and sleep disturbance  The patient is not nervous/anxious and is not hyperactive         Past Medical History:     Past Medical History:   Diagnosis Date    Anxiety     Depression     Food allergy     scallops    Gall stone     lap ari today 5/8/2019    History of migraine     Motion sickness     PONV (postoperative nausea and vomiting)     Wears glasses       Past Surgical History:     Past Surgical History:   Procedure Laterality Date    BREAST CYST ASPIRATION Left     benign-age 13-14    COLONOSCOPY      ENDOMETRIAL ABLATION      HYSTERECTOMY  2015    Partial    INJECTION ANESTHETIC AGENT TO CERVICAL PLEXUS      KNEE ARTHROSCOPY Left     OOPHORECTOMY Right 2016    ID LAP,CHOLECYSTECTOMY N/A 5/8/2019    Procedure: LAP ARI w/ IOC;  Surgeon: Perez Hadley MD;  [facility];  Service: General    ID LAP,DIAGNOSTIC ABDOMEN N/A 9/16/2016    Procedure: LAPAROSCOPY DIAGNOSTIC;  Surgeon: Lisa Will Shelby Rosenthal DO;  [facility];  Service: Gynecology    OR LAP,DIAGNOSTIC ABDOMEN N/A 5/8/2019    Procedure: LAPAROSCOPY DIAGNOSTIC;  Surgeon: Marcela Mohamud MD;  [facility];  Service: General    OR LAP,RMV  ADNEXAL STRUCTURE Right 9/16/2016    Procedure:  Alejo Schiller;  Surgeon: Coco Maguire DO;  [facility];  Service: Gynecology    REFRACTIVE SURGERY Bilateral     TONSILLECTOMY      WISDOM TOOTH EXTRACTION        Social History:     Social History     Socioeconomic History    Marital status: /Civil Union     Spouse name: None    Number of children: None    Years of education: None    Highest education level: None   Occupational History    None   Tobacco Use    Smoking status: Never Smoker    Smokeless tobacco: Never Used   Vaping Use    Vaping Use: Never used   Substance and Sexual Activity    Alcohol use:  Yes     Alcohol/week: 2 0 standard drinks     Types: 1 Glasses of wine, 1 Cans of beer per week     Comment: 2 weekly    Drug use: No    Sexual activity: None   Other Topics Concern    None   Social History Narrative    2 children    Caffeine use     Social Determinants of Health     Financial Resource Strain: Not on file   Food Insecurity: Not on file   Transportation Needs: Not on file   Physical Activity: Not on file   Stress: Not on file   Social Connections: Not on file   Intimate Partner Violence: Not on file   Housing Stability: Not on file      Family History:     Family History   Adopted: Yes   Problem Relation Age of Onset    Alcohol abuse Father     Substance Abuse Father     Diabetes Sister     Cancer Maternal Grandmother     Alcohol abuse Maternal Grandfather     Heart disease Maternal Grandfather     Hypertension Maternal Grandfather     Cancer Maternal Aunt     No Known Problems Daughter     No Known Problems Sister     Breast cancer Neg Hx       Current Medications:     Current Outpatient Medications   Medication Sig Dispense Refill  cholecalciferol (VITAMIN D3) 1,000 units tablet Take 1 tablet (1,000 Units total) by mouth daily 30 tablet 3    EPINEPHrine (EPIPEN) 0 3 mg/0 3 mL SOAJ inject 0 3 milliliters intramuscularly immediately for ALLERGIC REACTION      escitalopram (LEXAPRO) 10 mg tablet Take 1 tablet (10 mg total) by mouth daily 30 tablet 1    Galcanezumab-gnlm 120 MG/ML SOAJ Inject 120 mg under the skin every 30 (thirty) days 1 mL 11    hydrOXYzine HCL (ATARAX) 25 mg tablet Take 1 tablet (25 mg total) by mouth 2 (two) times a day as needed for anxiety (anxiety or insomnia) 60 tablet 1    magnesium Oxide (MAG-OX) 400 mg TABS Take 400 mg by mouth in the morning      Multiple Vitamin (MULTIVITAMIN) capsule Take 1 capsule by mouth daily      norethindrone (MICRONOR) 0 35 MG tablet Take 1 tablet (0 35 mg total) by mouth daily 90 tablet 3    rizatriptan (MAXALT-MLT) 10 MG disintegrating tablet take 1 tablet by mouth AT MIGRAINE ONSET CAN REPEAT ONCE IN 2 HOURS  MAX 2 TO 3 DAYS A WEEK 12 tablet 3    baclofen 10 mg tablet Take 10 mg by mouth 3 (three) times a day (Patient not taking: No sig reported)      cetirizine (ZyrTEC) 10 mg tablet  (Patient not taking: No sig reported)      Multiple Vitamins-Minerals (Multivitamin Adult, Minerals,) TABS Take 1 tablet by mouth       No current facility-administered medications for this visit  Allergies:      Allergies   Allergen Reactions    Grass Pollen(K-O-R-T-Swt Channing) Shortness Of Breath     Fresh cut, breathing     Penicillins Hives    Codeine GI Intolerance    Shellfish-Derived Products - Food Allergy Other (See Comments)     Intolerance to scallops -reaction severe vomitting    Tetracyclines & Related GI Intolerance    Ancef [Cefazolin] Tachycardia      Physical Exam:     /72 (BP Location: Left arm, Patient Position: Sitting, Cuff Size: Adult)   Pulse 102   Temp 98 7 °F (37 1 °C) (Tympanic)   Ht 5' 5" (1 651 m)   Wt 71 7 kg (158 lb)   LMP  (LMP Unknown)   SpO2 97%   BMI 26 29 kg/m²     Physical Exam  Vitals and nursing note reviewed  Constitutional:       Appearance: Normal appearance  She is normal weight  HENT:      Head: Normocephalic and atraumatic  Right Ear: Tympanic membrane, ear canal and external ear normal       Left Ear: Tympanic membrane, ear canal and external ear normal       Nose: Nose normal       Mouth/Throat:      Mouth: Mucous membranes are moist       Pharynx: Oropharynx is clear  Eyes:      Extraocular Movements: Extraocular movements intact  Conjunctiva/sclera: Conjunctivae normal    Neck:      Thyroid: No thyromegaly  Vascular: No carotid bruit  Cardiovascular:      Rate and Rhythm: Normal rate and regular rhythm  Pulses: Normal pulses  Heart sounds: Normal heart sounds  Pulmonary:      Effort: Pulmonary effort is normal       Breath sounds: Normal breath sounds  Abdominal:      General: Abdomen is flat  Bowel sounds are normal       Palpations: Abdomen is soft  There is no hepatomegaly, splenomegaly or mass  Tenderness: There is no abdominal tenderness  There is no right CVA tenderness or left CVA tenderness  Musculoskeletal:         General: No swelling  Normal range of motion  Cervical back: Normal range of motion and neck supple  Right lower leg: No edema  Left lower leg: No edema  Legs:       Comments: Pain upon palpation on plantar side laterally on right foot; pain at 1st metatarsal area dorsal surface on left foot  Lymphadenopathy:      Cervical: No cervical adenopathy  Skin:     General: Skin is warm and dry  Findings: No erythema or rash  Neurological:      General: No focal deficit present  Mental Status: She is alert and oriented to person, place, and time  Psychiatric:         Mood and Affect: Mood normal          Behavior: Behavior normal          Thought Content:  Thought content normal          Judgment: Judgment normal           Royden Laith EDIL Reddy  [address]

## 2022-06-16 NOTE — PROGRESS NOTES
PT Evaluation     Today's date: 2018  Patient name: Carlee Mario  : 1975  MRN: [id number]  Referring provider: mAy Quigley MD  Dx:   Encounter Diagnosis     ICD-10-CM    1  Right elbow pain M25 521    2  Tendinitis of right elbow M77 8                   Assessment    Assessment details: Carlee Mario is a pleasant 38 y o  presenting to physical therapy with MD referral for Right elbow pain  (primary encounter diagnosis), Tendinitis of right elbow  Elbow pain, right, and  Right elbow tendonitis  Problem list: Poor posture, decreased upper extremity strength, decreased wrist A/PROM, increased muscle tension and increased neural tension  Treatment to include: Manual therapy techniques, wrist/elbow A/AA/PROM, eccentric wrist strengthening, RTC/periscapular muscle strengthening, instruction in a comprehensive HEP, and modalities as needed  This pt would benefit from skilled PT services to address their impairments and functional limitations to maximize functional outcome  Barriers to therapy: Depression, chronicity of symptoms  Understanding of Dx/Px/POC: good   Prognosis: good    Goals  ST  Pt will improve wrist extension to at least 80 degrees  in 3 weeks  2  Pt will improve wrist flexion to at least 80 degrees in 3 weeks  LT  Pt will be able to lift and carry coffee pot with minimal to no pain in 6 weeks  2  Pt will be independent in a comprehensive HEP in 6 weeks  Plan  Patient would benefit from: skilled physical therapy  Frequency: 2x week  Duration in weeks: 6  Treatment plan discussed with: patient        Subjective Evaluation    History of Present Illness  Mechanism of injury: Pt reports onset of right lateral elbow pain in 2018  Pt reports she weight lifted that morning and later that day, the pain came on  Pt denies any change in the workout routine to cause increase in pain  Pt denies any other changes in every day activities    Pt was placed on prednisone for 2 weeks which significantly improved symptoms  Pt has recently seen ortho who provided pt a coritsone injection on 18 which has been beneficial  Pt had x-rays performed which did not reveal any bony abnormalities  No MRI performed at this point  Pt denies any numbness or tingling  Pt reports some aching in posterior aspect of shoulder on right side which as been present since elbow pain began  Premorbid status:  - ADLs: Independent with no difficulty  - Work: Full time, Full duty-  (mainly desk job)  - Recreation: beach body workout 6-7 days per week  Current status:  - ADLs/Functional activities:     - Washing lower back/tucking in shirt with Pain Levels: no pain   - Washing hair with Pain Levels: no pain   - Styling/braiding hair with moderate pain   - Driving with Pain Levels: no pain   - Lifting the coffee pot with severe pain and weakness   - Carrying phone in right hand with moderate pain   - Planking with moderate pain   - Sleeping with 0 nightly sleep disturbances due to pain   - no pain turning a doorknob   - stirring a pot with moderate pain  - Work: Full time, Full duty  - Recreation: walking for exercise  Pain  Current pain ratin  At best pain ratin  At worst pain ratin  Location: lateral elbow into forearm  Quality: dull ache  Relieving factors: ice and medications  Aggravating factors: lifting  Progression: improved      Diagnostic Tests  X-ray: normal  Treatments  Previous treatment: injection treatment  Patient Goals  Patient goals for therapy: decreased pain  Patient goal: Return to [employer]        Objective     Tenderness     Additional Tenderness Details  TTP over right proximal extensor tendons at insertion on lateral epicondyle      Active Range of Motion     Left Elbow   Flexion: WFL  Extension: WFL  Forearm supination: 90 degrees   Forearm pronation: 80 degrees     Right Elbow   Flexion: WFL  Extension: WFL  Forearm supination: 90 degrees Forearm pronation: 85 degrees     Left Wrist   Wrist flexion: 66 degrees   Wrist extension: 65 degrees     Right Wrist   Wrist flexion: 50 degrees with pain  Wrist extension: 65 degrees with pain    Passive Range of Motion     Left Wrist   Wrist flexion: 90 degrees   Wrist extension: 90 degrees     Right Wrist   Wrist flexion: 68 degrees with pain  Wrist extension: 75 degrees with pain    Strength/Myotome Testing     Left Shoulder     Planes of Motion   Flexion: 4   Abduction: 4   External rotation at 0°: 4   Internal rotation at 0°: 4     Right Shoulder     Planes of Motion   Flexion: 4-   Abduction: 4- (Pain over lateral elbow)   External rotation at 0°: 3+   Internal rotation at 0°: 3+     Left Elbow   Flexion: 4+  Extension: 4+  Forearm supination: 5  Forearm pronation: 5    Right Elbow   Flexion: 4-  Extension: 4- (pain with extension)  Forearm supination: 5  Forearm pronation: 5    Left Wrist/Hand   Wrist extension: 4+  Wrist flexion: 4+     (2nd hand position)     Trial 1: 30    Trial 2: 20    Trial 3: 25    Comments: Measured in pounds  Pain present over lateral epicondyle    Right Wrist/Hand   Wrist extension: 4  Wrist flexion: 4     (2nd hand position)     Trial 1: 30    Trial 2: 30    Trial 3: 30    Comments: 30 #    Additional Strength Details  Pain with testing of ECRB, not longus on R    Tests     Left Elbow   Negative [employer]  Right Elbow   Positive [employer]  Additional Tests Details  Radial nerve tension testing:  R: Symptom onset within 30 degrees of full elbow extension  L: Symptoms onset within 5 degrees of full elbow extension      General Comments     Elbow Comments   Full shoulder ROM bilaterally and no reproduction of symptoms with cervical AROM             Precautions: depression    Daily Treatment Diary     Manual  9-20 (IE)            [place]aston to right elbow extensors 8'            Radial nerve glides NV Exercise Diary  9-20 (IE)            MH to start 10 mins NV                         Manual First             - self wrist flexion str 4 x 30" NV            - self wrist extension str 4 x 30" NV            - AROM wrist flex 2 x 10 ea NV            - AROM wrist ext 2 x 10 ea NV            - AROM forearm pronation/supination 2 x 10 ea NV                         Scapular retraction 10 x 10" NV            Cervical retraction 10 x 10" NV                                                                                                                                     Modalities  9-20 (IE)            MH to start 10 mins NV            Cryo to finish 10 mins NV                           * On initial evaluation, educated pt on anatomy, pathology, and exercise rationale  Educated pt on activity modification to lift in supination, rather than pronation and to ice for pain control  Educated pt on self wrist flexion and extension stretches to perform 3 x per day 4 x 30" hold ea  Z Plasty Text: The lesion was extirpated to the level of the fat with a #15 scalpel blade.  Given the location of the defect, shape of the defect and the proximity to free margins a Z-plasty was deemed most appropriate for repair.  Using a sterile surgical marker, the appropriate transposition arms of the Z-plasty were drawn incorporating the defect and placing the expected incisions within the relaxed skin tension lines where possible.    The area thus outlined was incised deep to adipose tissue with a #15 scalpel blade.  The skin margins were undermined to an appropriate distance in all directions utilizing iris scissors.  The opposing transposition arms were then transposed into place in opposite direction and anchored with interrupted buried subcutaneous sutures.

## 2022-06-20 ENCOUNTER — TELEPHONE (OUTPATIENT)
Dept: FAMILY MEDICINE CLINIC | Facility: CLINIC | Age: 47
End: 2022-06-20

## 2022-06-20 NOTE — TELEPHONE ENCOUNTER
T/c from pt - pt inquiring about her next course of action regarding the bilateral mild arthritis? Should she see a specialist?    Please advise

## 2022-06-21 ENCOUNTER — APPOINTMENT (OUTPATIENT)
Dept: LAB | Facility: CLINIC | Age: 47
End: 2022-06-21
Payer: OTHER GOVERNMENT

## 2022-06-21 DIAGNOSIS — Z13.1 SCREENING FOR DIABETES MELLITUS: ICD-10-CM

## 2022-06-21 DIAGNOSIS — Z13.6 SCREENING FOR CARDIOVASCULAR CONDITION: ICD-10-CM

## 2022-06-21 LAB
ALBUMIN SERPL BCP-MCNC: 3.6 G/DL (ref 3.5–5)
ALP SERPL-CCNC: 67 U/L (ref 46–116)
ALT SERPL W P-5'-P-CCNC: 30 U/L (ref 12–78)
ANION GAP SERPL CALCULATED.3IONS-SCNC: 4 MMOL/L (ref 4–13)
AST SERPL W P-5'-P-CCNC: 24 U/L (ref 5–45)
BILIRUB SERPL-MCNC: 1.29 MG/DL (ref 0.2–1)
BUN SERPL-MCNC: 9 MG/DL (ref 5–25)
CALCIUM SERPL-MCNC: 8.3 MG/DL (ref 8.3–10.1)
CHLORIDE SERPL-SCNC: 109 MMOL/L (ref 100–108)
CHOLEST SERPL-MCNC: 145 MG/DL
CO2 SERPL-SCNC: 26 MMOL/L (ref 21–32)
CREAT SERPL-MCNC: 0.83 MG/DL (ref 0.6–1.3)
GFR SERPL CREATININE-BSD FRML MDRD: 83 ML/MIN/1.73SQ M
GLUCOSE P FAST SERPL-MCNC: 91 MG/DL (ref 65–99)
HDLC SERPL-MCNC: 63 MG/DL
LDLC SERPL CALC-MCNC: 74 MG/DL (ref 0–100)
NONHDLC SERPL-MCNC: 82 MG/DL
POTASSIUM SERPL-SCNC: 4.2 MMOL/L (ref 3.5–5.3)
PROT SERPL-MCNC: 7.1 G/DL (ref 6.4–8.2)
SODIUM SERPL-SCNC: 139 MMOL/L (ref 136–145)
TRIGL SERPL-MCNC: 41 MG/DL

## 2022-06-21 PROCEDURE — 80061 LIPID PANEL: CPT

## 2022-06-21 PROCEDURE — 36415 COLL VENOUS BLD VENIPUNCTURE: CPT

## 2022-06-21 PROCEDURE — 80053 COMPREHEN METABOLIC PANEL: CPT

## 2022-07-08 ENCOUNTER — TELEMEDICINE (OUTPATIENT)
Dept: PSYCHIATRY | Facility: CLINIC | Age: 47
End: 2022-07-08
Payer: OTHER GOVERNMENT

## 2022-07-08 DIAGNOSIS — F33.0 DEPRESSION, MAJOR, RECURRENT, MILD (HCC): ICD-10-CM

## 2022-07-08 PROCEDURE — 99213 OFFICE O/P EST LOW 20 MIN: CPT

## 2022-07-08 RX ORDER — ESCITALOPRAM OXALATE 10 MG/1
10 TABLET ORAL DAILY
Qty: 90 TABLET | Refills: 1 | Status: SHIPPED | OUTPATIENT
Start: 2022-07-08 | End: 2022-08-04

## 2022-07-08 RX ORDER — HYDROXYZINE HYDROCHLORIDE 25 MG/1
25 TABLET, FILM COATED ORAL 2 TIMES DAILY PRN
Qty: 90 TABLET | Refills: 1 | Status: SHIPPED | OUTPATIENT
Start: 2022-07-08

## 2022-07-08 NOTE — PSYCH
Virtual Regular Visit    Verification of patient location:    Patient is located in the following state in which I hold an active license [place]    Problem List Items Addressed This Visit        Other    Depression, major, recurrent, mild (HCC)    Relevant Medications    hydrOXYzine HCL (ATARAX) 25 mg tablet    escitalopram (LEXAPRO) 10 mg tablet             Encounter provider SUSI Gates    Provider located at   [address] [address] [phone]    Recent Visits  No visits were found meeting these conditions  Showing recent visits within past 7 days and meeting all other requirements  Today's Visits  Date Type Provider Dept   07/08/22 Telemedicine SUSI Gates [facility]   Showing today's visits and meeting all other requirements  Future Appointments  No visits were found meeting these conditions  Showing future appointments within next 150 days and meeting all other requirements         The patient was identified by name and date of birth  Cailin Samaniego was informed that this is a telemedicine visit and that the visit is being conducted throughEpic Embedded and patient was informed this is a secure, HIPAA-complaint platform  She agrees to proceed     My office door was closed  No one else was in the room  She acknowledged consent and understanding of privacy and security of the video platform  The patient has agreed to participate and understands they can discontinue the visit at any time  Patient is aware this is a billable service       HPI     Current Outpatient Medications   Medication Sig Dispense Refill    escitalopram (LEXAPRO) 10 mg tablet Take 1 tablet (10 mg total) by mouth daily 90 tablet 1    hydrOXYzine HCL (ATARAX) 25 mg tablet Take 1 tablet (25 mg total) by mouth 2 (two) times a day as needed for anxiety (anxiety or insomnia) 90 tablet 1    cholecalciferol (VITAMIN D3) 1,000 units tablet Take 1 tablet (1,000 Units total) by mouth daily 30 tablet 3    EPINEPHrine (EPIPEN) 0 3 mg/0 3 mL SOAJ inject 0 3 milliliters intramuscularly immediately for ALLERGIC REACTION      Galcanezumab-gnlm 120 MG/ML SOAJ Inject 120 mg under the skin every 30 (thirty) days 1 mL 11    magnesium Oxide (MAG-OX) 400 mg TABS Take 400 mg by mouth in the morning      Multiple Vitamin (MULTIVITAMIN) capsule Take 1 capsule by mouth daily      norethindrone (MICRONOR) 0 35 MG tablet Take 1 tablet (0 35 mg total) by mouth daily 90 tablet 3    rizatriptan (MAXALT-MLT) 10 MG disintegrating tablet take 1 tablet by mouth AT MIGRAINE ONSET CAN REPEAT ONCE IN 2 HOURS  MAX 2 TO 3 DAYS A WEEK 12 tablet 3     No current facility-administered medications for this visit  Review of Systems  Video Exam    There were no vitals filed for this visit  Physical Exam   As a result of this visit, I have referred the patient for further respiratory evaluation  No    I spent 15 minutes directly with the patient during this visit  VIRTUAL VISIT DISCLAIMER    [address] acknowledges that she has consented to an online visit or consultation  She understands that the online visit is based solely on information provided by her, and that, in the absence of a face-to-face physical evaluation by the physician, the diagnosis she receives is both limited and provisional in terms of accuracy and completeness  This is not intended to replace a full medical face-to-face evaluation by the physician  [address] understands and accepts these terms      MEDICATION MANAGEMENT NOTE        [facility]    Name and Date of Birth:  Jonna Sheth 48 y o  1975 MRN: [id number]    Date of Visit: July 8, 2022    Allergies   Allergen Reactions    Grass Pollen(K-O-R-T-Swt Channing) Shortness Of Breath     Fresh cut, breathing     Penicillins Hives    Codeine GI Intolerance    Shellfish-Derived Products - Food Allergy Other (See Comments)     Intolerance to scallops -reaction severe vomitting    Tetracyclines & Related GI Intolerance    Ancef [Cefazolin] Tachycardia     SUBJECTIVE:    Cecy is seen today for a follow up for Major Depressive Disorder  She continues to do very well since the last visit  Patient reports doing very well on Lexapro 10 mg and p r n  Atarax 25 mg for the management of depression +anxiety  Feels her symptoms are under control and are very manageable at this point, declines any need for medication increases at this point  Denies any periods of extreme anxiety and denies all panic attacks  Recently came back from vacation to [place] with her  and reports having a great time  Continues to work in a stressful job but no new stressors related to her occupation  Medication refills provided today and she denies all side effects from psychiatric medications  Denies suicidal ideation  Continue psychotherapy going forward  She denies any side effects from medications  PLAN:    Continue Lexapro 10 mg daily  Continue Atarax 25 mg as needed for anxiety or insomnia        Aware of 24 hour and weekend coverage for urgent situations accessed by calling [facility] main practice number  Continue psychotherapy with therapist    Diagnoses and all orders for this visit:    Depression, major, recurrent, mild (HCC)  -     hydrOXYzine HCL (ATARAX) 25 mg tablet; Take 1 tablet (25 mg total) by mouth 2 (two) times a day as needed for anxiety (anxiety or insomnia)  -     escitalopram (LEXAPRO) 10 mg tablet;  Take 1 tablet (10 mg total) by mouth daily        Current Outpatient Medications on File Prior to Visit   Medication Sig Dispense Refill    cholecalciferol (VITAMIN D3) 1,000 units tablet Take 1 tablet (1,000 Units total) by mouth daily 30 tablet 3    EPINEPHrine (EPIPEN) 0 3 mg/0 3 mL SOAJ inject 0 3 milliliters intramuscularly immediately for ALLERGIC REACTION      Galcanezumab-gnlm 120 MG/ML SOAJ Inject 120 mg under the skin every 30 (thirty) days 1 mL 11    magnesium Oxide (MAG-OX) 400 mg TABS Take 400 mg by mouth in the morning      Multiple Vitamin (MULTIVITAMIN) capsule Take 1 capsule by mouth daily      norethindrone (MICRONOR) 0 35 MG tablet Take 1 tablet (0 35 mg total) by mouth daily 90 tablet 3    rizatriptan (MAXALT-MLT) 10 MG disintegrating tablet take 1 tablet by mouth AT MIGRAINE ONSET CAN REPEAT ONCE IN 2 HOURS  MAX 2 TO 3 DAYS A WEEK 12 tablet 3    [DISCONTINUED] escitalopram (LEXAPRO) 10 mg tablet Take 1 tablet (10 mg total) by mouth daily 30 tablet 1    [DISCONTINUED] hydrOXYzine HCL (ATARAX) 25 mg tablet Take 1 tablet (25 mg total) by mouth 2 (two) times a day as needed for anxiety (anxiety or insomnia) 60 tablet 1     No current facility-administered medications on file prior to visit  Psychotherapy Provided:     Individual psychotherapy provided: Yes  Counseling was provided during the session today for 16 minutes  Supportive counseling provided  HPI ROS Appetite Changes and Sleep:     She reports normal sleep, normal appetite, normal energy level   Denies homicidal ideation, denies suicidal ideation    Review Of Systems:      HPI ROS:               Medication Side Effects:  denies    Depression (10 worst): 1/10    Anxiety (10 worst): 1/10    Safety concerns (SI, HI, etc): Denies si and hi    Sleep: 7 hrs    Energy: good    Appetite: 3 meals    Weight Change: denies        Mental Status Evaluation:    Appearance Adequate hygiene and grooming   Behavior cooperative   Mood euthymic  Depression Scale - 1 of 10 (0 = No depression)  Anxiety Scale - 1 of 10 (0 = No anxiety)   Speech Normal rate and volume   Affect appropriate   Thought Processes Goal directed and coherent   Thought Content Does not verbalize delusional material   Associations Tightly connected   Perceptual Disturbances Denies hallucinations and does not appear to be responding to internal stimuli   Risk Potential Suicidal/Homicidal Ideation - No evidence of suicidal or homicidal ideation and patient does not verbalize suicidal or homicidal ideation  Risk of Violence - No evidence of risk for violence found on assessment  Risk of Self Mutilation - No evidence of risk for self mutilation found on assessment   Orientation oriented to person, place, time/date and situation   Memory recent and remote memory grossly intact   Consciousness alert and awake   Attention/Concentration attention span and concentration are age appropriate   Insight intact   Judgement intact   Muscle Strength and Gait normal muscle strength and normal muscle tone, normal gait/station and normal balance   Motor Activity no abnormal movements   Language no difficulty naming common objects, no difficulty writing a sentence   Fund of Knowledge adequate knowledge of current events  adequate fund of knowledge regarding past history  adequate fund of knowledge regarding vocabulary      Past Psychiatric History Update:     Inpatient Psychiatric Admission Since Last Encounter:   no  Changes to Outpatient Psychiatric Treatment Team:    no  Suicide Attempt Or Self Mutilation Since Last Encounter:   no  Incidence of Violent Behavior Since Last Encounter:   no    Traumatic History Update:     New Onset of Abuse Since Last Encounter:   no  Traumatic Events Since Last Encounter:   no    Past Medical History:    Past Medical History:   Diagnosis Date    Anxiety     Depression     Food allergy     scallops    Gall stone     lap ari today 5/8/2019    History of migraine     Motion sickness     PONV (postoperative nausea and vomiting)     Wears glasses         Past Surgical History:   Procedure Laterality Date    BREAST CYST ASPIRATION Left     benign-age 13-14    COLONOSCOPY      ENDOMETRIAL ABLATION      HYSTERECTOMY  2015    Partial    INJECTION ANESTHETIC AGENT TO CERVICAL PLEXUS      KNEE ARTHROSCOPY Left     OOPHORECTOMY Right 2016    [place] LAP,CHOLECYSTECTOMY N/A 5/8/2019    Procedure: LAP JULES w/ IOC;  Surgeon: Gianfranco Choi MD;  Location: [facility];  Service: General    [place] LAP,DIAGNOSTIC ABDOMEN N/A 9/16/2016    Procedure: LAPAROSCOPY DIAGNOSTIC;  Surgeon: Katarzyna Galan DO;  [facility];  Service: Gynecology    [place] LAP,DIAGNOSTIC ABDOMEN N/A 5/8/2019    Procedure: LAPAROSCOPY DIAGNOSTIC;  Surgeon: Gianfranco Choi MD;  [facility];  Service: General    [place] LAP,RMV  ADNEXAL STRUCTURE Right 9/16/2016    Procedure:  Myrla Hancock;  Surgeon: Katarzyna Galan DO;  Location[facility];  Service: Gynecology    REFRACTIVE SURGERY Bilateral     TONSILLECTOMY      WISDOM TOOTH EXTRACTION       Allergies   Allergen Reactions    Grass Pollen(K-O-R-T-Swt Channing) Shortness Of Breath     Fresh cut, breathing     Penicillins Hives    Codeine GI Intolerance    Shellfish-Derived Products - Food Allergy Other (See Comments)     Intolerance to scallops -reaction severe vomitting    Tetracyclines & Related GI Intolerance    Ancef [Cefazolin] Tachycardia     Substance Abuse History:    Social History     Substance and Sexual Activity   Alcohol Use Yes    Alcohol/week: 2 0 standard drinks    Types: 1 Glasses of wine, 1 Cans of beer per week    Comment: 2 weekly     Social History     Substance and Sexual Activity   Drug Use No     Social History:    Social History     Socioeconomic History    Marital status: /Civil Union     Spouse name: Not on file    Number of children: Not on file    Years of education: Not on file    Highest education level: Not on file   Occupational History    Not on file   Tobacco Use    Smoking status: Never Smoker    Smokeless tobacco: Never Used   Vaping Use    Vaping Use: Never used   Substance and Sexual Activity    Alcohol use:  Yes     Alcohol/week: 2 0 standard drinks     Types: 1 Glasses of wine, 1 Cans of beer per week     Comment: 2 weekly    Drug use: No    Sexual activity: Not on file   Other Topics Concern    Not on file   Social History Narrative    2 children    Caffeine use     Social Determinants of Health     Financial Resource Strain: Not on file   Food Insecurity: Not on file   Transportation Needs: Not on file   Physical Activity: Not on file   Stress: Not on file   Social Connections: Not on file   Intimate Partner Violence: Not on file   Housing Stability: Not on file     Family Psychiatric History:     Family History   Adopted: Yes   Problem Relation Age of Onset    Alcohol abuse Father     Substance Abuse Father     Diabetes Sister     Cancer Maternal Grandmother     Alcohol abuse Maternal Grandfather     Heart disease Maternal Grandfather     Hypertension Maternal Grandfather     Cancer Maternal Aunt     No Known Problems Daughter     No Known Problems Sister     Breast cancer Neg Hx      History Review: The following portions of the patient's history were reviewed and updated as appropriate: allergies, current medications, past family history, past medical history, past social history, past surgical history and problem list     OBJECTIVE:     Vital signs in last 24 hours: There were no vitals filed for this visit  Laboratory Results: I have personally reviewed all pertinent laboratory/tests results      Suicide/Homicide Risk Assessment:    Risk of Harm to Self:  Protective Factors: no current suicidal ideation, access to mental health treatment, being , compliant with medications, compliant with mental health treatment  Based on today's assessment, Lacho Noel presents the following risk of harm to self: none    Risk of Harm to Others:  Based on today's assessment, Cecy presents the following risk of harm to others: none    The following interventions are recommended: no intervention changes needed    Medications Risks/Benefits:      Risks, Benefits And Possible Side Effects Of Medications:    Discussed risks and benefits of treatment with patient including risk of suicidality, serotonin syndrome, increased QTc interval and SIADH related to treatment with antidepressants; Risk of induction of manic symptoms in certain patient populations     Controlled Medication Discussion:     Not applicable    Treatment Plan:    Due for update/Updated:   no  Last treatment plan done 4/5/22 by clotilde cruz  Treatment Plan due on 10/5/22  SUSI Estrada 07/08/22    This note was shared with patient

## 2022-07-13 ENCOUNTER — PATIENT MESSAGE (OUTPATIENT)
Dept: FAMILY MEDICINE CLINIC | Facility: CLINIC | Age: 47
End: 2022-07-13

## 2022-07-13 DIAGNOSIS — M79.673 PAIN OF FOOT, UNSPECIFIED LATERALITY: Primary | ICD-10-CM

## 2022-07-21 ENCOUNTER — SOCIAL WORK (OUTPATIENT)
Dept: BEHAVIORAL/MENTAL HEALTH CLINIC | Facility: CLINIC | Age: 47
End: 2022-07-21
Payer: OTHER GOVERNMENT

## 2022-07-21 DIAGNOSIS — F41.9 ANXIETY: ICD-10-CM

## 2022-07-21 DIAGNOSIS — F33.0 DEPRESSION, MAJOR, RECURRENT, MILD (HCC): Primary | ICD-10-CM

## 2022-07-21 PROCEDURE — 90832 PSYTX W PT 30 MINUTES: CPT | Performed by: SOCIAL WORKER
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
## 2022-07-21 NOTE — BH TREATMENT PLAN
Evens Smith  1975       Date of Initial Treatment Plan: 2/25/22  Date of Current Treatment Plan: 07/21/22    Treatment Plan Number 2    Strengths/Personal Resources for Self Care: employed, loves her family, using meditation and exercise as wellness tools, enjoys traveling    Diagnosis:   1  Depression, major, recurrent, mild ([address] )     2  Anxiety         Area of Needs: maintain progress      Long Term Goal 1: maintain progress, support    Target Date: 12/21/22  Completion Date: TBD         Short Term Objectives for Goal 1: see objectives below   1 1  Cecy will continue to demonstrate openness to engage in therapy as evidenced by regular attendance and communication of her thoughts and feelings  1 2  Cecy will continue to express her feelings about triggers to her depression and anxiety  1 3  Cecy will continue to use and develop mindfulness and relaxation techniques to improve her mood and level of functioning  1 4  Cecy will continue to use and develop cognitive coping mechanisms to dismantle unhelpful thoughts to help alleviate symptoms of anxiety and depression  1 5  Cecy will continue to communicate her thoughts and feelings to supports in her life  1 6  Cecy will remain compliant with psychiatric medications  GOAL 1: Modality: Individual x 6-8 weeks, more as needed   Completion Date TBD and The person(s) responsible for carrying out the plan is  Yohana Villavicencio, Nakul Loza, and [employer]  Modalities: CBT, psychoeducation, mindfulness, stress management skills,  DBT skills, guided imagery, emotional needs meeting  Behavioral Health Treatment Plan [facility]: Diagnosis and Treatment Plan explained to Shanna Roldan relates understanding diagnosis and is agreeable to Treatment Plan         Client Comments : Please share your thoughts, feelings, need and/or experiences regarding your treatment plan:     Evens Smith, 1975, actively participated in the review and update of this treatment plan her session today  Raysa Jacob  provided verbal consent on 7/21/2022 at 0820 AM due to recent rise in COVID 19 cases  The treatment plan was transcribed into the Electronic Health Record at a later time

## 2022-08-03 ENCOUNTER — TELEPHONE (OUTPATIENT)
Dept: PSYCHIATRY | Facility: CLINIC | Age: 47
End: 2022-08-03

## 2022-08-04 DIAGNOSIS — F33.0 DEPRESSION, MAJOR, RECURRENT, MILD (HCC): ICD-10-CM

## 2022-08-04 RX ORDER — ESCITALOPRAM OXALATE 5 MG/1
10 TABLET ORAL DAILY
Qty: 30 TABLET | Refills: 0
Start: 2022-08-04 | End: 2022-09-26

## 2022-08-04 NOTE — TELEPHONE ENCOUNTER
Returned call, pt has noticed "water weight" and would like to decrease Lexparo to 5mg daily to alleviate side effects  Medication change made, call provider if further questions

## 2022-08-09 ENCOUNTER — ANNUAL EXAM (OUTPATIENT)
Dept: OBGYN CLINIC | Facility: CLINIC | Age: 47
End: 2022-08-09
Payer: OTHER GOVERNMENT

## 2022-08-09 VITALS — SYSTOLIC BLOOD PRESSURE: 102 MMHG | BODY MASS INDEX: 26.53 KG/M2 | DIASTOLIC BLOOD PRESSURE: 60 MMHG | WEIGHT: 159.4 LBS

## 2022-08-09 DIAGNOSIS — Z90.711 STATUS POST LAPAROSCOPIC SUPRACERVICAL HYSTERECTOMY: ICD-10-CM

## 2022-08-09 DIAGNOSIS — E55.9 VITAMIN D DEFICIENCY: ICD-10-CM

## 2022-08-09 DIAGNOSIS — Z01.419 ENCOUNTER FOR ANNUAL ROUTINE GYNECOLOGICAL EXAMINATION: Primary | ICD-10-CM

## 2022-08-09 DIAGNOSIS — Z90.79 STATUS POST BILATERAL SALPINGECTOMY: ICD-10-CM

## 2022-08-09 DIAGNOSIS — Z12.31 ENCOUNTER FOR SCREENING MAMMOGRAM FOR BREAST CANCER: ICD-10-CM

## 2022-08-09 DIAGNOSIS — F41.9 ANXIETY: ICD-10-CM

## 2022-08-09 DIAGNOSIS — N95.1 MENOPAUSAL SYNDROME (HOT FLASHES): ICD-10-CM

## 2022-08-09 DIAGNOSIS — G20 PARKINSON'S DISEASE (HCC): ICD-10-CM

## 2022-08-09 PROCEDURE — 99396 PREV VISIT EST AGE 40-64: CPT | Performed by: OBSTETRICS & GYNECOLOGY

## 2022-08-09 RX ORDER — ACETAMINOPHEN AND CODEINE PHOSPHATE 120; 12 MG/5ML; MG/5ML
1 SOLUTION ORAL DAILY
Qty: 90 TABLET | Refills: 3 | Status: SHIPPED | OUTPATIENT
Start: 2022-08-09 | End: 2022-11-07

## 2022-08-09 NOTE — PROGRESS NOTES
Assessment     Annual well-woman exam    Status post laparoscopic supracervical hysterectomy in     Status post bilateral salpingectomy      Menopausal syndrome     New onset Parkinson's symptoms     History of vitamin-D deficiency        Plan     Renew norethindrone for menopausal syndrome    Screening laboratory studies follow-up vitamin-D deficiency    History of anxiety depression    Last Pap smear in  was normal next Pap due in   All questions answered  Await pap smear results  Subjective  Here for annual exam     Madelyn Haddad is a 48 y o  female who presents for annual exam  Patient is menopausal since her hysterectomy, hot flashes improved with norethindrone treatment  Recent diagnosis of early onset Parkinson's symptoms  Denies any pelvic pain symptoms denies vaginal dryness denies unusual discharge or bleeding symptoms  Screening mammogram has been ordered  Follow-up here in 1 year p r n  all questions answered  The patient reports that there is not domestic violence in her life  Current contraception: status post hysterectomy  History of abnormal Pap smear: no  Family history of uterine or ovarian cancer: no  Regular self breast exam: yes  History of abnormal mammogram: no  Family history of breast cancer: no  History of abnormal lipids: no  Menstrual History:  OB History        2    Para   2    Term   2            AB        Living           SAB        IAB        Ectopic        Multiple        Live Births                    Menarche age: 13  No LMP recorded (lmp unknown)  Patient has had a hysterectomy  Review of Systems  Pertinent items are noted in HPI        Objective   No acute distress   /60   Wt 72 3 kg (159 lb 6 4 oz)   LMP  (LMP Unknown)   BMI 26 53 kg/m²     General:   alert and oriented, in no acute distress, alert, appears stated age and cooperative   Heart: regular rate and rhythm, S1, S2 normal, no murmur, click, rub or gallop   Lungs: clear to auscultation bilaterally   Abdomen: soft, non-tender, without masses or organomegaly   Vulva: normal, Bartholin's, Urethra, Aitkin's normal, female escutcheon   Vagina: normal mucosa, normal discharge, no palpable nodules   Cervix: multiparous appearance, no cervical motion tenderness, no lesions and  Pap smear deferred next Pap due in 2023  Uterus: surgically absent   Adnexa: normal adnexa   Bilateral breast exam in the sitting and supine position with chaperone present, no visible or palpable breast lesions identified  No breast masses noted  No supraclavicular or axillary lymphadenopathy noted  No nipple discharge  Reviewed self-breast exam techniques     Rectal exam,  deferred

## 2022-08-11 ENCOUNTER — APPOINTMENT (OUTPATIENT)
Dept: LAB | Facility: CLINIC | Age: 47
End: 2022-08-11
Payer: OTHER GOVERNMENT

## 2022-08-11 DIAGNOSIS — E55.9 VITAMIN D DEFICIENCY: ICD-10-CM

## 2022-08-11 DIAGNOSIS — N95.1 MENOPAUSAL SYNDROME (HOT FLASHES): ICD-10-CM

## 2022-08-11 LAB
25(OH)D3 SERPL-MCNC: 22.7 NG/ML (ref 30–100)
TSH SERPL DL<=0.05 MIU/L-ACNC: 2.4 UIU/ML (ref 0.45–4.5)

## 2022-08-11 PROCEDURE — 82306 VITAMIN D 25 HYDROXY: CPT

## 2022-08-11 PROCEDURE — 36415 COLL VENOUS BLD VENIPUNCTURE: CPT

## 2022-08-11 PROCEDURE — 84443 ASSAY THYROID STIM HORMONE: CPT

## 2022-08-15 ENCOUNTER — CONSULT (OUTPATIENT)
Dept: MULTI SPECIALTY CLINIC | Facility: CLINIC | Age: 47
End: 2022-08-15

## 2022-08-15 VITALS
OXYGEN SATURATION: 99 % | WEIGHT: 163 LBS | DIASTOLIC BLOOD PRESSURE: 64 MMHG | BODY MASS INDEX: 27.12 KG/M2 | HEART RATE: 69 BPM | TEMPERATURE: 98 F | SYSTOLIC BLOOD PRESSURE: 97 MMHG

## 2022-08-15 DIAGNOSIS — M77.8 EXTENSOR TENDINITIS OF FOOT: Primary | ICD-10-CM

## 2022-08-15 DIAGNOSIS — M20.11 VALGUS DEFORMITY OF BOTH GREAT TOES: ICD-10-CM

## 2022-08-15 DIAGNOSIS — M79.672 PAIN IN BOTH FEET: ICD-10-CM

## 2022-08-15 DIAGNOSIS — M20.12 VALGUS DEFORMITY OF BOTH GREAT TOES: ICD-10-CM

## 2022-08-15 DIAGNOSIS — M79.671 PAIN IN BOTH FEET: ICD-10-CM

## 2022-08-15 DIAGNOSIS — G20 PARKINSON DISEASE (HCC): ICD-10-CM

## 2022-08-15 PROCEDURE — 99213 OFFICE O/P EST LOW 20 MIN: CPT | Performed by: PODIATRIST

## 2022-08-15 NOTE — PROGRESS NOTES
Podiatry Clinic  Iram Burdick 48 y o  female MRN: [id number]  Encounter: [id number]      Assessment/Plan        Diagnoses and all orders for this visit:    Extensor tendinitis of foot    Pain in both feet  -     Ambulatory Referral to Podiatry    Parkinson disease ([address] )    Valgus deformity of both great toes       Plan:   Patient was seen/examined  All questions and concerns addressed   Patient was advised to change footwear to sneakers with wide toe box as well as orthotics with appropriate arch support to control overpronation  Provided list of brands for both sneakers and orthotics   Discussed the pathomechanics behind extensor tendonitis with patient as it relates to overpronation  All questions and concerns were addressed   Recommended meloxicam to help with pain and inflammation but patient opted to start with change in footwear and orthotics only  She states that if the change in footwear and orthotics do not resolve the problem, she will be open to taking meloxicam     Patient will follow up in 1 month  She will call sooner if any issues or concerns arise  Dr Jina Huitron was present during this entire procedure  History of Present Illness     Yony Adams is a 48year old female patient with past medical history of Parkinson's presenting today with pain to dorsal foot and ankle bilaterally  She states that the pain started months ago, worsening over time, with the R foot pain being worse than L foot pain  She states that she had tried changing her footwear to sneakers but it has not helped  She has also tried ibuprofen which has also not helped  She reports seeing her primary care physician about a month ago who sent her for XRAYs and told her that the pain could be secondary to arthritis  She states that the pain worsens the more she ambulates and it is partially alleviated with rest  She also reports tenderness on palpation to these areas as well     She has no other pedal complaints  She denies any N/V/F/CP/SOB  Review of Systems   Constitutional: Negative  HENT: Negative  Eyes: Negative  Respiratory: Negative  Cardiovascular: Negative  Gastrointestinal: Negative  Musculoskeletal: pain to dorsal foot and ankle b/l  Skin: Negative  Neurological: Negative         Historical Information   Past Medical History:   Diagnosis Date    Anxiety     Depression     Food allergy     scallops    Gall stone     lap jules today 5/8/2019    History of migraine     Motion sickness     PONV (postoperative nausea and vomiting)     Wears glasses      Past Surgical History:   Procedure Laterality Date    BREAST CYST ASPIRATION Left     benign-age 13-14    COLONOSCOPY      ENDOMETRIAL ABLATION      HYSTERECTOMY  2015    Partial    INJECTION ANESTHETIC AGENT TO CERVICAL PLEXUS      KNEE ARTHROSCOPY Left     OOPHORECTOMY Right 2016    NM LAP,CHOLECYSTECTOMY N/A 5/8/2019    Procedure: LAP JULES w/ IOC;  Surgeon: Neena Araujo MD;  [facility];  Service: General    NM LAP,DIAGNOSTIC ABDOMEN N/A 9/16/2016    Procedure: LAPAROSCOPY DIAGNOSTIC;  Surgeon: Stacy Dent DO;  [facility];  Service: Gynecology    NM LAP,DIAGNOSTIC ABDOMEN N/A 5/8/2019    Procedure: LAPAROSCOPY DIAGNOSTIC;  Surgeon: Neena Araujo MD;  [facility];  Service: General    NM LAP,RMV  ADNEXAL STRUCTURE Right 9/16/2016    Procedure:  Sarah Exon;  Surgeon: Stacy Dent DO;  [facility];  Service: Gynecology    REFRACTIVE SURGERY Bilateral     TONSILLECTOMY      WISDOM TOOTH EXTRACTION       Social History   Social History     Substance and Sexual Activity   Alcohol Use Yes    Alcohol/week: 2 0 standard drinks    Types: 1 Glasses of wine, 1 Cans of beer per week    Comment: 2 weekly     Social History     Substance and Sexual Activity   Drug Use No     Social History     Tobacco Use   Smoking Status Never Smoker   Smokeless Tobacco Never Used Family History:   Family History   Adopted: Yes   Problem Relation Age of Onset    Alcohol abuse Father     Substance Abuse Father     Diabetes Sister     Cancer Maternal Grandmother     Alcohol abuse Maternal Grandfather     Heart disease Maternal Grandfather     Hypertension Maternal Grandfather     Cancer Maternal Aunt     No Known Problems Daughter     No Known Problems Sister     Breast cancer Neg Hx        Meds/Allergies   (Not in a hospital admission)    Allergies   Allergen Reactions    Grass Pollen(K-O-R-T-Swt Channing) Shortness Of Breath     Fresh cut, breathing     Penicillins Hives    Codeine GI Intolerance    Shellfish-Derived Products - Food Allergy Other (See Comments)     Intolerance to scallops -reaction severe vomitting    Tetracyclines & Related GI Intolerance    Ancef [Cefazolin] Tachycardia       Objective     Current Vitals:   Blood Pressure: 97/64 (08/15/22 1541)  Pulse: 69 (08/15/22 1541)  Temperature: 98 °F (36 7 °C) (08/15/22 1541)  Temp Source: Temporal (08/15/22 1541)  Weight - Scale: 73 9 kg (163 lb) (08/15/22 1541)  SpO2: 99 % (08/15/22 1541)        BP 97/64 (BP Location: Right arm, Patient Position: Sitting, Cuff Size: Standard)   Pulse 69   Temp 98 °F (36 7 °C) (Temporal)   Wt 73 9 kg (163 lb)   LMP  (LMP Unknown)   SpO2 99%   BMI 27 12 kg/m²       Lower Extremity Exam:    Vascular: Right foot DP/PT +2                   Left foot DP/PT +2                   There is trace lower extremity edema bilateral over dorsal foot and ankle bilaterally  Capillary refill <3 seconds b/l  Skin temperature WNL  Musculoskeletal: There is 5/5 strength throughout the bilateral lower extremity    - Passive ROM of ankle, subtalar and all MTPJ does not illicit pain b/l  Mild pain on active dorsiflexion of ankle and all MTPJs b/l  Worsening pain on active resistance during dorsiflexion of ankle and all MTPJs b/l   Tenderness on palpation of anterolateral ankle and lateral aspect of dorsal foot b/l  Pain is worse on R than L    - Hallux valgus deformity noted b/l - tracking hallux, dorsomedial prominence, mild erythema at 1st MTPJ, limited dorsiflexion, mild pain on palpation of 1st MTPJ, R worse than L  Neurological: Sensation to 5 07 [place] nylon filament: intact      Vibratory sense to distal Foot intact     Proprioception intact    Dermatology: No open lesions or wounds noted to feet b/l  No evidence of macerated tissue to any webspaces  No hyperkeratotic lesions noted  Toenails show no signs of pathology

## 2022-09-23 ENCOUNTER — SOCIAL WORK (OUTPATIENT)
Dept: BEHAVIORAL/MENTAL HEALTH CLINIC | Facility: CLINIC | Age: 47
End: 2022-09-23
Payer: OTHER GOVERNMENT

## 2022-09-23 DIAGNOSIS — F41.9 ANXIETY: Primary | ICD-10-CM

## 2022-09-23 PROCEDURE — 90832 PSYTX W PT 30 MINUTES: CPT | Performed by: SOCIAL WORKER

## 2022-09-23 NOTE — PSYCH
This note was not shared with the patient due to this is a psychotherapy note      Psychotherapy Provided: Individual Psychotherapy 25 minutes     Length of time in session: 25 minutes, follow up in 6-8 week  Start time: 0800  End time: 0825    Goals addressed in session: Goal 1     Pain:      none    0    Current suicide risk : Low     Met with Cecy Sam reports doing fairly well  Session focused on Cecy providing an update since last session, discussing how she is coping, how she is remaining clear about using her coping skills  Topics discussed today include: her recent dx of early onset Parkinson's disease and her upcoming week trip with her  through the [employer] [facility] says she is feeling some relief after recently receiving the dx of early onset Parkinson's disease, sharing she felt something more was going on other than anxiety and felt she finally found a doctor who listened to her  She expressed her thoughts and feelings about this dx and the support her  is providing her  Cecy and her  are looking forward to an upcoming trip together where they will both learn how to better manage her 's PTSD symptoms  Cecy continues to use exercise and meditation as her primary anxiety-reduction tools  She wishes to continue therapy x 6-8 weeks for ongoing support  Behavioral Health Treatment Plan [facility]: Diagnosis and Treatment Plan explained to Nahum Vasquez relates understanding diagnosis and is agreeable to Treatment Plan   Yes

## 2022-09-26 ENCOUNTER — TELEMEDICINE (OUTPATIENT)
Dept: PSYCHIATRY | Facility: CLINIC | Age: 47
End: 2022-09-26
Payer: OTHER GOVERNMENT

## 2022-09-26 DIAGNOSIS — F33.0 DEPRESSION, MAJOR, RECURRENT, MILD (HCC): Primary | ICD-10-CM

## 2022-09-26 DIAGNOSIS — F41.9 ANXIETY: ICD-10-CM

## 2022-09-26 PROCEDURE — 99213 OFFICE O/P EST LOW 20 MIN: CPT

## 2022-09-26 RX ORDER — TRAZODONE HYDROCHLORIDE 50 MG/1
25 TABLET ORAL
Qty: 30 TABLET | Refills: 1 | Status: SHIPPED | OUTPATIENT
Start: 2022-09-26

## 2022-09-26 RX ORDER — ESCITALOPRAM OXALATE 5 MG/1
5 TABLET ORAL DAILY
Qty: 90 TABLET | Refills: 0 | Status: SHIPPED | OUTPATIENT
Start: 2022-09-26

## 2022-09-26 NOTE — PSYCH
Virtual Regular Visit    Verification of patient location:    Patient is located in the following state in which I hold an active license [place]    Problem List Items Addressed This Visit        Other    Depression, major, recurrent, mild ([facility] [address] ) - Primary    Relevant Medications    traZODone (DESYREL) 50 mg tablet    escitalopram (LEXAPRO) 5 mg tablet    Anxiety    Relevant Medications    traZODone (DESYREL) 50 mg tablet             Encounter provider Read SUSI Cantu    Provider located at    [address] [place] [address]  [address] [facility] [address] [phone]    Recent Visits  No visits were found meeting these conditions  Showing recent visits within past 7 days and meeting all other requirements  Today's Visits  Date Type Provider Dept   09/26/22 Telemedicine Read SUSI Cantu [facility]   Showing today's visits and meeting all other requirements  Future Appointments  No visits were found meeting these conditions  Showing future appointments within next 150 days and meeting all other requirements         The patient was identified by name and date of birth  Orly Abel was informed that this is a telemedicine visit and that the visit is being conducted throughic Embedded and patient was informed this is a secure, HIPAA-complaint platform  She agrees to proceed     My office door was closed  No one else was in the room  She acknowledged consent and understanding of privacy and security of the video platform  The patient has agreed to participate and understands they can discontinue the visit at any time  Patient is aware this is a billable service       HPI     Current Outpatient Medications   Medication Sig Dispense Refill    carbidopa-levodopa (SINEMET)  mg per tablet       cholecalciferol (VITAMIN D3) 1,000 units tablet Take 1 tablet (1,000 Units total) by mouth daily 30 tablet 3    EPINEPHrine (EPIPEN) 0 3 mg/0 3 mL SOAJ inject 0 3 milliliters intramuscularly immediately for ALLERGIC REACTION      escitalopram (LEXAPRO) 5 mg tablet Take 1 tablet (5 mg total) by mouth daily 90 tablet 0    Galcanezumab-gnlm 120 MG/ML SOAJ Inject 120 mg under the skin every 30 (thirty) days 1 mL 11    hydrOXYzine HCL (ATARAX) 25 mg tablet Take 1 tablet (25 mg total) by mouth 2 (two) times a day as needed for anxiety (anxiety or insomnia) 90 tablet 1    magnesium Oxide (MAG-OX) 400 mg TABS Take 400 mg by mouth in the morning      Multiple Vitamin (MULTIVITAMIN) capsule Take 1 capsule by mouth daily      norethindrone (MICRONOR) 0 35 MG tablet Take 1 tablet (0 35 mg total) by mouth daily 90 tablet 3    rizatriptan (MAXALT-MLT) 10 MG disintegrating tablet take 1 tablet by mouth AT MIGRAINE ONSET CAN REPEAT ONCE IN 2 HOURS  MAX 2 TO 3 DAYS A WEEK 12 tablet 3    traZODone (DESYREL) 50 mg tablet Take 0 5 tablets (25 mg total) by mouth daily at bedtime 30 tablet 1     No current facility-administered medications for this visit  Review of Systems  Video Exam    There were no vitals filed for this visit  Physical Exam   As a result of this visit, I have referred the patient for further respiratory evaluation  No    I spent 14 minutes directly with the patient during this visit  VIRTUAL VISIT DISCLAIMER    [address] acknowledges that she has consented to an online visit or consultation  She understands that the online visit is based solely on information provided by her, and that, in the absence of a face-to-face physical evaluation by the physician, the diagnosis she receives is both limited and provisional in terms of accuracy and completeness  This is not intended to replace a full medical face-to-face evaluation by the physician  [address] understands and accepts these terms      MEDICATION MANAGEMENT NOTE        [address]    Name and Date of Birth: Mckenna Jacob 48 y o  1975 MRN: [id number]    Date of Visit: September 26, 2022    Allergies   Allergen Reactions    Grass Pollen(K-O-R-T-Swt Channing) Shortness Of Breath     Fresh cut, breathing     Penicillins Hives    Codeine GI Intolerance    Shellfish-Derived Products - Food Allergy Other (See Comments)     Intolerance to scallops -reaction severe vomitting    Tetracyclines & Related GI Intolerance    Ancef [Cefazolin] Tachycardia     SUBJECTIVE:    Cecy is seen today for a follow up for Major Depressive Disorder and anxiety  She reports that she continues to do relatively well since the last visit  Patient recently seen by a movement disorder specialist and a potential diagnosis of early onset Parkinson's disease is being discussed  Prescribed Sinemet 25-100mg  According to therapy note, patient is relieved she was able to find some answers as she believed symptoms were more than just anxiety  From a psychiatric standpoint, patient is stable on Lexapro 5 mg daily, reports minimal and manageable depression + anxiety levels with no acute symptoms  Denies panic attacks  Trouble initiating sleep, initiate trazodone 25 mg daily for insomnia and patient agreed to plan  Use p r n  Atarax sparingly and only if needed due to potential to make patients tremors worse  No further acute stressors, she is looking forward to upcoming trip with her   Continue to follow-up with psychotherapy  Denies SI          PLAN:    -Continue Lexapro 5 mg daily PARQ completed including serotonin syndrome, SIADH, worsening depression, suicidality, induction of devi, GI upset, headaches, activation, sexual side effects, sedation, potential drug interactions, and others     -Initiate Trazodone 25mg daily for insomnia PARQ completed including dizziness, headache, GI distress, sedation, confusion, priapism, suicidal thoughts, serotonin syndrome, drug interactions, induction of devi and others     -Continue Atarax 25 mg PRN as needed for anxiety or insomnia, use sparingly as medication is an antihistamine and can make tremors worse, patient demonstrated understanding  Aware of 24 hour and weekend coverage for urgent situations accessed by calling [facility] main practice number  Continue psychotherapy with therapist    Diagnoses and all orders for this visit:    Depression, major, recurrent, mild ([facility] [address] )  -     traZODone (DESYREL) 50 mg tablet; Take 0 5 tablets (25 mg total) by mouth daily at bedtime  -     escitalopram (LEXAPRO) 5 mg tablet; Take 1 tablet (5 mg total) by mouth daily    Anxiety  -     traZODone (DESYREL) 50 mg tablet; Take 0 5 tablets (25 mg total) by mouth daily at bedtime        Current Outpatient Medications on File Prior to Visit   Medication Sig Dispense Refill    carbidopa-levodopa (SINEMET)  mg per tablet       cholecalciferol (VITAMIN D3) 1,000 units tablet Take 1 tablet (1,000 Units total) by mouth daily 30 tablet 3    EPINEPHrine (EPIPEN) 0 3 mg/0 3 mL SOAJ inject 0 3 milliliters intramuscularly immediately for ALLERGIC REACTION      Galcanezumab-gnlm 120 MG/ML SOAJ Inject 120 mg under the skin every 30 (thirty) days 1 mL 11    hydrOXYzine HCL (ATARAX) 25 mg tablet Take 1 tablet (25 mg total) by mouth 2 (two) times a day as needed for anxiety (anxiety or insomnia) 90 tablet 1    magnesium Oxide (MAG-OX) 400 mg TABS Take 400 mg by mouth in the morning      Multiple Vitamin (MULTIVITAMIN) capsule Take 1 capsule by mouth daily      norethindrone (MICRONOR) 0 35 MG tablet Take 1 tablet (0 35 mg total) by mouth daily 90 tablet 3    rizatriptan (MAXALT-MLT) 10 MG disintegrating tablet take 1 tablet by mouth AT MIGRAINE ONSET CAN REPEAT ONCE IN 2 HOURS  MAX 2 TO 3 DAYS A WEEK 12 tablet 3    [DISCONTINUED] escitalopram (LEXAPRO) 5 mg tablet Take 2 tablets (10 mg total) by mouth daily 30 tablet 0     No current facility-administered medications on file prior to visit  Psychotherapy Provided:     Individual psychotherapy provided: Yes  Counseling was provided during the session today for 16 minutes  Supportive counseling provided  HPI ROS Appetite Changes and Sleep:     She reports difficulty falling asleep, adequate appetite, adequate energy level   Denies homicidal ideation, denies suicidal ideation    Review Of Systems:      HPI ROS:               Medication Side Effects:  denies    Depression (10 worst): 1/10    Anxiety (10 worst): 1/10    Safety concerns (SI, HI, etc): Denies si and hi    Sleep: difficulty initiating sleep, begin trazodone    Energy: fair    Appetite: fair    Weight Change: denies        Mental Status Evaluation:    Appearance Adequate hygiene and grooming   Behavior calm and cooperative   Mood euthymic  Depression Scale - 1 of 10 (0 = No depression)  Anxiety Scale - 1 of 10 (0 = No anxiety)   Speech Normal rate and volume   Affect appropriate   Thought Processes Goal directed and coherent   Thought Content Does not verbalize delusional material   Associations Tightly connected   Perceptual Disturbances Denies hallucinations and does not appear to be responding to internal stimuli   Risk Potential Suicidal/Homicidal Ideation - No evidence of suicidal or homicidal ideation and patient does not verbalize suicidal or homicidal ideation  Risk of Violence - No evidence of risk for violence found on assessment  Risk of Self Mutilation - No evidence of risk for self mutilation found on assessment   Orientation oriented to person, place, time/date and situation   Memory recent and remote memory grossly intact   Consciousness alert and awake   Attention/Concentration attention span and concentration are age appropriate   Insight intact   Judgement intact   Muscle Strength and Gait normal muscle strength and normal muscle tone, normal gait/station and normal balance   Motor Activity no abnormal movements   Language no difficulty naming common objects, no difficulty repeating a phrase, no difficulty writing a sentence   Fund of Knowledge adequate knowledge of current events  adequate fund of knowledge regarding past history  adequate fund of knowledge regarding vocabulary      Past Psychiatric History Update:     Inpatient Psychiatric Admission Since Last Encounter:   no  Changes to Outpatient Psychiatric Treatment Team:    no  Suicide Attempt Or Self Mutilation Since Last Encounter:   no  Incidence of Violent Behavior Since Last Encounter:   no    Traumatic History Update:     New Onset of Abuse Since Last Encounter:   no  Traumatic Events Since Last Encounter:   no    Past Medical History:    Past Medical History:   Diagnosis Date    Anxiety     Depression     Food allergy     scallops    Gall stone     lap jules today 5/8/2019    History of migraine     Motion sickness     PONV (postoperative nausea and vomiting)     Wears glasses         Past Surgical History:   Procedure Laterality Date    BREAST CYST ASPIRATION Left     benign-age 13-14    COLONOSCOPY      ENDOMETRIAL ABLATION      HYSTERECTOMY  2015    Partial    INJECTION ANESTHETIC AGENT TO CERVICAL PLEXUS      KNEE ARTHROSCOPY Left     OOPHORECTOMY Right 2016    CT LAP,CHOLECYSTECTOMY N/A 5/8/2019    Procedure: LAP JULES w/ IOC;  Surgeon: Gaye Patten MD;  [facility];  Service: General    CT LAP,DIAGNOSTIC ABDOMEN N/A 9/16/2016    Procedure: LAPAROSCOPY DIAGNOSTIC;  Surgeon: Marine Melo DO;  [facility];  Service: Gynecology    CT LAP,DIAGNOSTIC ABDOMEN N/A 5/8/2019    Procedure: LAPAROSCOPY DIAGNOSTIC;  Surgeon: Gaye Patten MD;  [facility];  Service: General    CT LAP,RMV  ADNEXAL STRUCTURE Right 9/16/2016    Procedure:  Dana Weiner;  Surgeon: Marine Melo DO;  [facility];  Service: Gynecology    REFRACTIVE SURGERY Bilateral     TONSILLECTOMY      WISDOM TOOTH EXTRACTION       Allergies   Allergen Reactions    Grass Pollen(K-O-R-T-Swt Channing) Shortness Of Breath     Fresh cut, breathing     Penicillins Hives    Codeine GI Intolerance    Shellfish-Derived Products - Food Allergy Other (See Comments)     Intolerance to scallops -reaction severe vomitting    Tetracyclines & Related GI Intolerance    Ancef [Cefazolin] Tachycardia     Substance Abuse History:    Social History     Substance and Sexual Activity   Alcohol Use Yes    Alcohol/week: 2 0 standard drinks    Types: 1 Glasses of wine, 1 Cans of beer per week    Comment: 2 weekly     Social History     Substance and Sexual Activity   Drug Use No     Social History:    Social History     Socioeconomic History    Marital status: /Civil Union     Spouse name: Not on file    Number of children: Not on file    Years of education: Not on file    Highest education level: Not on file   Occupational History    Not on file   Tobacco Use    Smoking status: Never Smoker    Smokeless tobacco: Never Used   Vaping Use    Vaping Use: Never used   Substance and Sexual Activity    Alcohol use:  Yes     Alcohol/week: 2 0 standard drinks     Types: 1 Glasses of wine, 1 Cans of beer per week     Comment: 2 weekly    Drug use: No    Sexual activity: Not on file   Other Topics Concern    Not on file   Social History Narrative    2 children    Caffeine use     Social Determinants of Health     Financial Resource Strain: Not on file   Food Insecurity: Not on file   Transportation Needs: Not on file   Physical Activity: Not on file   Stress: Not on file   Social Connections: Not on file   Intimate Partner Violence: Not on file   Housing Stability: Not on file     Family Psychiatric History:     Family History   Adopted: Yes   Problem Relation Age of Onset    Alcohol abuse Father     Substance Abuse Father     Diabetes Sister     Cancer Maternal Grandmother     Alcohol abuse Maternal Grandfather     Heart disease Maternal Grandfather     Hypertension Maternal Grandfather  Cancer Maternal Aunt     No Known Problems Daughter     No Known Problems Sister     Breast cancer Neg Hx      History Review: The following portions of the patient's history were reviewed and updated as appropriate: allergies, current medications, past family history, past medical history, past social history, past surgical history and problem list     OBJECTIVE:     Vital signs in last 24 hours: There were no vitals filed for this visit  Laboratory Results:   Recent Labs (last 2 months):   Appointment on 08/11/2022   Component Date Value    TSH 3RD GENERATON 08/11/2022 2 400     Vit D, 25-Hydroxy 08/11/2022 22 7 (A)     I have personally reviewed all pertinent laboratory/tests results  Suicide/Homicide Risk Assessment:    Risk of Harm to Self:  Protective Factors: no current suicidal ideation, access to mental health treatment, being , compliant with medications, compliant with mental health treatment, connection to community  Based on today's assessment, Milana iTan presents the following risk of harm to self: none    Risk of Harm to Others:  Based on today's assessment, Cecy presents the following risk of harm to others: none    The following interventions are recommended: therapy appointment in 6 weeks    Medications Risks/Benefits:      Risks, Benefits And Possible Side Effects Of Medications:    Discussed risks and benefits of treatment with patient including risk of suicidality, serotonin syndrome, increased QTc interval and SIADH related to treatment with antidepressants; Risk of induction of manic symptoms in certain patient populations     Controlled Medication Discussion:     Not applicable    Treatment Plan:    Due for update/Updated:   no  Last treatment plan done 7/21/22 by clotilde cruz  Treatment Plan due on 2/21/23  SUSI Griffith 09/26/22    This note was shared with patient

## 2022-10-10 ENCOUNTER — OFFICE VISIT (OUTPATIENT)
Dept: MULTI SPECIALTY CLINIC | Facility: CLINIC | Age: 47
End: 2022-10-10

## 2022-10-10 VITALS
DIASTOLIC BLOOD PRESSURE: 70 MMHG | HEART RATE: 59 BPM | BODY MASS INDEX: 27.57 KG/M2 | HEIGHT: 65 IN | OXYGEN SATURATION: 98 % | WEIGHT: 165.5 LBS | TEMPERATURE: 98 F | SYSTOLIC BLOOD PRESSURE: 106 MMHG

## 2022-10-10 DIAGNOSIS — M20.12 VALGUS DEFORMITY OF BOTH GREAT TOES: ICD-10-CM

## 2022-10-10 DIAGNOSIS — M20.11 VALGUS DEFORMITY OF BOTH GREAT TOES: ICD-10-CM

## 2022-10-10 DIAGNOSIS — G20 PARKINSON DISEASE (HCC): ICD-10-CM

## 2022-10-10 DIAGNOSIS — M77.8 EXTENSOR TENDINITIS OF FOOT: Primary | ICD-10-CM

## 2022-10-10 DIAGNOSIS — M79.671 PAIN IN BOTH FEET: ICD-10-CM

## 2022-10-10 DIAGNOSIS — M79.672 PAIN IN BOTH FEET: ICD-10-CM

## 2022-10-10 NOTE — PROGRESS NOTES
Podiatry Clinic  Angella Suarez 48 y o  female MRN: [id number]  Encounter: [id number]      Assessment/Plan        Diagnoses and all orders for this visit:    Extensor tendinitis of foot    Pain in both feet    Parkinson disease (HCC)    Valgus deformity of both great toes           Plan:  • Patient was seen/examined  All questions and concerns addressed  • As patient's pain has significantly improved since last visit, recommend continuing use of OTC orthotics  She lost the list of recommended orthotics and sneakers that was given to her, so she was given a new list today  • Patient is satisfied with improvement in pain levels and has returned to normal activities  Patient will follow up as needed  Dr Rahul Means was present during this entire procedure  History of Present Illness     Angella Suarez is a 48year old female patient who presents for follow up appointment for suspected extensor tendonitis of b/l feet, history of R worse than L  She states that she had purchased one of the OTC orthotics that was recommended to her during her last visit  She has been using the orthotics regularly which has greatly alleviated her pain levels  She has not attempted to change footwear  She denies any numbness or tingling  She denies any N/V/F/CP/SOB  Review of Systems   Constitutional: Negative  HENT: Negative  Eyes: Negative  Respiratory: Negative  Cardiovascular: Negative  Gastrointestinal: Negative  Musculoskeletal: as noted in HPI  Skin: Negative  Neurological: Negative         Historical Information   Past Medical History:   Diagnosis Date   • Anxiety    • Depression    • Food allergy     scallops   • Gall stone     lap ari today 5/8/2019   • History of migraine    • Motion sickness    • PONV (postoperative nausea and vomiting)    • Wears glasses      Past Surgical History:   Procedure Laterality Date   • BREAST CYST ASPIRATION Left     benign-age 13-14   • COLONOSCOPY     • ENDOMETRIAL ABLATION     • HYSTERECTOMY  2015    Partial   • INJECTION ANESTHETIC AGENT TO CERVICAL PLEXUS     • KNEE ARTHROSCOPY Left    • OOPHORECTOMY Right 2016   • NC LAP,CHOLECYSTECTOMY N/A 5/8/2019    Procedure: LAP JULES w/ IOC;  Surgeon: Erica Valenzuela MD;  [facility];  Service: General   • NC LAP,DIAGNOSTIC ABDOMEN N/A 9/16/2016    Procedure: LAPAROSCOPY DIAGNOSTIC;  Surgeon: Mónica Razo DO;  [facility];  Service: Gynecology   • NC LAP,DIAGNOSTIC ABDOMEN N/A 5/8/2019    Procedure: LAPAROSCOPY DIAGNOSTIC;  Surgeon: Erica Valenzuela MD;  [facility];  Service: General   • NC LAP,RMV  ADNEXAL STRUCTURE Right 9/16/2016    Procedure:  Jenna Dam;  Surgeon: Mónica Razo DO;  [facility];  Service: Gynecology   • REFRACTIVE SURGERY Bilateral    • TONSILLECTOMY     • WISDOM TOOTH EXTRACTION       Social History   Social History     Substance and Sexual Activity   Alcohol Use Yes   • Alcohol/week: 2 0 standard drinks   • Types: 1 Glasses of wine, 1 Cans of beer per week    Comment: 2 weekly     Social History     Substance and Sexual Activity   Drug Use No     Social History     Tobacco Use   Smoking Status Never Smoker   Smokeless Tobacco Never Used     Family History:   Family History   Adopted: Yes   Problem Relation Age of Onset   • Alcohol abuse Father    • Substance Abuse Father    • Diabetes Sister    • Cancer Maternal Grandmother    • Alcohol abuse Maternal Grandfather    • Heart disease Maternal Grandfather    • Hypertension Maternal Grandfather    • Cancer Maternal Aunt    • No Known Problems Daughter    • No Known Problems Sister    • Breast cancer Neg Hx        Meds/Allergies   (Not in a hospital admission)    Allergies   Allergen Reactions   • Grass Pollen(K-O-R-T-Swt Channing) Shortness Of Breath     Fresh cut, breathing    • Penicillins Hives   • Codeine GI Intolerance   • Shellfish-Derived Products - Food Allergy Other (See Comments)     Intolerance to scallops -reaction severe vomitting   • Tetracyclines & Related GI Intolerance   • Ancef [Cefazolin] Tachycardia       Objective     Current Vitals:   Blood Pressure: 106/70 (10/10/22 1614)  Pulse: 59 (10/10/22 1614)  Temperature: 98 °F (36 7 °C) (10/10/22 1614)  Temp Source: Temporal (10/10/22 1614)  Height: 5' 5" (165 1 cm) (10/10/22 1614)  Weight - Scale: 75 1 kg (165 lb 8 oz) (10/10/22 1614)  SpO2: 98 % (10/10/22 1614)        /70 (BP Location: Right arm, Patient Position: Sitting, Cuff Size: Standard)   Pulse 59   Temp 98 °F (36 7 °C) (Temporal)   Ht 5' 5" (1 651 m)   Wt 75 1 kg (165 lb 8 oz)   LMP  (LMP Unknown)   SpO2 98%   BMI 27 54 kg/m²       Lower Extremity Exam:    Vascular: Right foot DP/PT +2                   Left foot DP/PT +2                   There is no lower extremity edema bilateral     Musculoskeletal: There is 5/5 strength throughout the bilateral lower extremity   - There is some discomfort over palpation of R dorsal forefoot   - Passive and active ROM of ankle, subtalar and all MTPJ does not illicit pain b/l  Mild pain on active dorsiflexion of ankle and all MTPJs on R  No pain produced on L  Neurological: Sensation to 5 07 [place] nylon filament: intact bilaterally  Proprioception intact bilaterally  Dermatology: Skin Condition:  normal     There is not evidence of macerated tissue between toe spaces  Nail Exam: normal nails without lesions       Open ulcerations: No     Calluses: No

## 2022-11-01 ENCOUNTER — TELEPHONE (OUTPATIENT)
Dept: PSYCHIATRY | Facility: CLINIC | Age: 47
End: 2022-11-01

## 2022-11-01 NOTE — TELEPHONE ENCOUNTER
Spoke to patient, she is on the lowest dose of Lexapro 5 mg daily, patient plans to stop medication due to inability to lose weight  Patient had no further questions, she has an upcoming appointment

## 2022-11-21 ENCOUNTER — TELEMEDICINE (OUTPATIENT)
Dept: PSYCHIATRY | Facility: CLINIC | Age: 47
End: 2022-11-21

## 2022-11-21 DIAGNOSIS — F41.9 ANXIETY: ICD-10-CM

## 2022-11-21 DIAGNOSIS — F33.0 DEPRESSION, MAJOR, RECURRENT, MILD (HCC): Primary | ICD-10-CM

## 2022-11-22 NOTE — PSYCH
Virtual Regular Visit    Verification of patient location:    Patient is located in the following state in which I hold an active license [place]    Problem List Items Addressed This Visit        Other    Depression, major, recurrent, mild ([facility] [address] ) - Primary    Anxiety          Encounter provider SUSI Valladares    Provider located at    [address] [place] [id number] [phone]    Recent Visits  Date Type Provider Dept   [facility]   Showing recent visits within past 7 days and meeting all other requirements  Future Appointments  No visits were found meeting these conditions  Showing future appointments within next 150 days and meeting all other requirements         The patient was identified by name and date of birth  Manan Escudero was informed that this is a telemedicine visit and that the visit is being conducted [employer]  She agrees to proceed     My office door was closed  No one else was in the room  She acknowledged consent and understanding of privacy and security of the video platform  The patient has agreed to participate and understands they can discontinue the visit at any time  Patient is aware this is a billable service       HPI     Current Outpatient Medications   Medication Sig Dispense Refill   • carbidopa-levodopa (SINEMET)  mg per tablet      • cholecalciferol (VITAMIN D3) 1,000 units tablet Take 1 tablet (1,000 Units total) by mouth daily 30 tablet 3   • EPINEPHrine (EPIPEN) 0 3 mg/0 3 mL SOAJ inject 0 3 milliliters intramuscularly immediately for ALLERGIC REACTION     • Galcanezumab-gnlm 120 MG/ML SOAJ Inject 120 mg under the skin every 30 (thirty) days 1 mL 11   • hydrOXYzine HCL (ATARAX) 25 mg tablet Take 1 tablet (25 mg total) by mouth 2 (two) times a day as needed for anxiety (anxiety or insomnia) 90 tablet 1   • magnesium Oxide (MAG-OX) 400 mg TABS Take 400 mg by mouth in the morning     • Multiple Vitamin (MULTIVITAMIN) capsule Take 1 capsule by mouth daily     • rizatriptan (MAXALT-MLT) 10 MG disintegrating tablet take 1 tablet by mouth AT MIGRAINE ONSET CAN REPEAT ONCE IN 2 HOURS  MAX 2 TO 3 DAYS A WEEK 12 tablet 3   • norethindrone (MICRONOR) 0 35 MG tablet Take 1 tablet (0 35 mg total) by mouth daily 90 tablet 3     No current facility-administered medications for this visit  Review of Systems  Video Exam    There were no vitals filed for this visit  Physical Exam   As a result of this visit, I have referred the patient for further respiratory evaluation  No      VIRTUAL VISIT DISCLAIMER    Chris Gann acknowledges that she has consented to an online visit or consultation  She understands that the online visit is based solely on information provided by her, and that, in the absence of a face-to-face physical evaluation by the physician, the diagnosis she receives is both limited and provisional in terms of accuracy and completeness  This is not intended to replace a full medical face-to-face evaluation by the physician  Chris Pemanicholas understands and accepts these terms  MEDICATION MANAGEMENT NOTE        [facility]    Name and Date of Birth:  Chris Gann 48 y o  1975 MRN: [id number]    Date of Visit: November 22, 2022    Allergies   Allergen Reactions   • Grass Pollen(K-O-R-T-Swt [place]anning) Shortness Of Breath     Fresh cut, breathing    • Penicillins Hives   • Codeine GI Intolerance   • Shellfish-Derived Products - Food Allergy Other (See Comments)     Intolerance to scallops -reaction severe vomitting   • Tetracyclines & Related GI Intolerance   • Ancef [Cefazolin] Tachycardia     SUBJECTIVE:    Cecy is seen today for a follow up for depression  She reports that she continues to do relatively well since the last visit  Recently spoke to patient on the phone, she was interested in titrating off Lexapro due to inability to lose weight, she is now completely off Lexapro and taking no psychiatric medications at this time  She would like to trial no medications for 2 months and seeing how she responds before considering another antidepressant such as Wellbutrin  Denies any overwhelming symptoms at this time, reports some irritability but admits that could be due to many external stressors in her life  Follow-up in two months, will re-evaluate symptoms and assess any potential needs for medications  Patient has no other concerns or questions at this time  She continues to follow-up with psychotherapy on a monthly approach  Denies SI        PLAN:     -currently taking no psychiatric medications, she will follow-up in two months to assess the need for an antidepressant   -continue psychotherapy on a weekly approach    Aware of 24 hour and weekend coverage for urgent situations accessed by calling [facility] main practice number    Diagnoses and all orders for this visit:    Depression, major, recurrent, mild (HCC)    Anxiety        Current Outpatient Medications on File Prior to Visit   Medication Sig Dispense Refill   • carbidopa-levodopa (SINEMET)  mg per tablet      • cholecalciferol (VITAMIN D3) 1,000 units tablet Take 1 tablet (1,000 Units total) by mouth daily 30 tablet 3   • EPINEPHrine (EPIPEN) 0 3 mg/0 3 mL SOAJ inject 0 3 milliliters intramuscularly immediately for ALLERGIC REACTION     • Galcanezumab-gnlm 120 MG/ML SOAJ Inject 120 mg under the skin every 30 (thirty) days 1 mL 11   • hydrOXYzine HCL (ATARAX) 25 mg tablet Take 1 tablet (25 mg total) by mouth 2 (two) times a day as needed for anxiety (anxiety or insomnia) 90 tablet 1   • magnesium Oxide (MAG-OX) 400 mg TABS Take 400 mg by mouth in the morning     • Multiple Vitamin (MULTIVITAMIN) capsule Take 1 capsule by mouth daily     • rizatriptan (MAXALT-MLT) 10 MG disintegrating tablet take 1 tablet by mouth AT MIGRAINE ONSET CAN REPEAT ONCE IN 2 HOURS  MAX 2 TO 3 DAYS A WEEK 12 tablet 3   • norethindrone (MICRONOR) 0 35 MG tablet Take 1 tablet (0 35 mg total) by mouth daily 90 tablet 3     No current facility-administered medications on file prior to visit  Psychotherapy Provided:     Individual psychotherapy provided: Yes  Counseling was provided during the session today for 16 minutes  Supportive counseling provided  Medication education provided to [place]  HPI ROS Appetite Changes and Sleep:     She reports normal sleep, adequate appetite, adequate energy level   Denies homicidal ideation, denies suicidal ideation    Review Of Systems:      HPI ROS:               Medication Side Effects:  N/A    Depression (10 worst): 1/10    Anxiety (10 worst): 1/10    Safety concerns (SI, HI, etc): Denies si and hi    Sleep: WNL    Energy: WNL    Appetite: fair    Weight Change: Denies, prior inability to lose weight with the lexapro        Mental Status Evaluation:    Appearance Adequate hygiene and grooming   Behavior calm and cooperative   Mood euthymic  Depression Scale - 1 of 10 (0 = No depression)  Anxiety Scale - 1 of 10 (0 = No anxiety)   Speech Normal rate and volume   Affect appropriate   Thought Processes Goal directed and coherent   Thought Content Does not verbalize delusional material   Associations Tightly connected   Perceptual Disturbances Denies hallucinations and does not appear to be responding to internal stimuli   Risk Potential Suicidal/Homicidal Ideation - No evidence of suicidal or homicidal ideation and patient does not verbalize suicidal or homicidal ideation  Risk of Violence - No evidence of risk for violence found on assessment  Risk of Self Mutilation - No evidence of risk for self mutilation found on assessment   Orientation oriented to person, place, time/date and situation   Memory recent and remote memory grossly intact Consciousness alert and awake   Attention/Concentration attention span and concentration are age appropriate   Insight intact   Judgement intact   Muscle Strength and Gait normal muscle strength and normal muscle tone, normal gait/station and normal balance   Motor Activity no abnormal movements   Language no difficulty naming common objects, no difficulty repeating a phrase, no difficulty writing a sentence   Fund of Knowledge adequate knowledge of current events  adequate fund of knowledge regarding past history  adequate fund of knowledge regarding vocabulary          Past Medical History:    Past Medical History:   Diagnosis Date   • Anxiety    • Depression    • Food allergy     scallops   • Gall stone     lap ari today 5/8/2019   • History of migraine    • Motion sickness    • PONV (postoperative nausea and vomiting)    • Wears glasses         Past Surgical History:   Procedure Laterality Date   • BREAST CYST ASPIRATION Left     benign-age 13-14   • COLONOSCOPY     • ENDOMETRIAL ABLATION     • HYSTERECTOMY  2015    Partial   • INJECTION ANESTHETIC AGENT TO CERVICAL PLEXUS     • KNEE ARTHROSCOPY Left    • OOPHORECTOMY Right 2016   • [place] LAP,CHOLECYSTECTOMY N/A 5/8/2019    Procedure: LAP ARI w/ IOC;  Surgeon: Loni Lomeli MD;  [facility];  Service: General   • ND LAP,DIAGNOSTIC ABDOMEN N/A 9/16/2016    Procedure: LAPAROSCOPY DIAGNOSTIC;  Surgeon: [facility] ;  [facility];  Service: Gynecology   • ND LAP,DIAGNOSTIC ABDOMEN N/A 5/8/2019    Procedure: LAPAROSCOPY DIAGNOSTIC;  Surgeon: Loni Lomeli MD;  [facility];  Service: General   • ND LAP,RMV  ADNEXAL STRUCTURE Right 9/16/2016    Procedure:  OOPHRECTOMY;  Surgeon: [facility] ;  [facility];  Service: Gynecology   • REFRACTIVE SURGERY Bilateral    • TONSILLECTOMY     • WISDOM TOOTH EXTRACTION       Allergies   Allergen Reactions   • Grass Pollen(K-O-R-T-Swt Channing) Shortness Of Breath     Fresh cut, breathing    • Penicillins Hives   • Codeine GI Intolerance   • Shellfish-Derived Products - Food Allergy Other (See Comments)     Intolerance to scallops -reaction severe vomitting   • Tetracyclines & Related GI Intolerance   • Ancef [Cefazolin] Tachycardia     Substance Abuse History:    Social History     Substance and Sexual Activity   Alcohol Use Yes   • Alcohol/week: 2 0 standard drinks   • Types: 1 Glasses of wine, 1 Cans of beer per week    Comment: 2 weekly     Social History     Substance and Sexual Activity   Drug Use No     Social History:    Social History     Socioeconomic History   • Marital status: /Civil Union     Spouse name: Not on file   • Number of children: Not on file   • Years of education: Not on file   • Highest education level: Not on file   Occupational History   • Not on file   Tobacco Use   • Smoking status: Never   • Smokeless tobacco: Never   Vaping Use   • Vaping Use: Never used   Substance and Sexual Activity   • Alcohol use:  Yes     Alcohol/week: 2 0 standard drinks     Types: 1 Glasses of wine, 1 Cans of beer per week     Comment: 2 weekly   • Drug use: No   • Sexual activity: Not on file   Other Topics Concern   • Not on file   Social History Narrative    2 children    Caffeine use     Social Determinants of Health     Financial Resource Strain: Not on file   Food Insecurity: Not on file   Transportation Needs: Not on file   Physical Activity: Not on file   Stress: Not on file   Social Connections: Not on file   Intimate Partner Violence: Not on file   Housing Stability: Not on file     Family Psychiatric History:     Family History   Adopted: Yes   Problem Relation Age of Onset   • Alcohol abuse Father    • Substance Abuse Father    • Diabetes Sister    • Cancer Maternal Grandmother    • Alcohol abuse Maternal Grandfather    • Heart disease Maternal Grandfather    • Hypertension Maternal Grandfather    • Cancer Maternal Aunt    • No Known Problems Daughter    • No Known Problems Sister    • Breast cancer Neg Hx      History Review: The following portions of the patient's history were reviewed and updated as appropriate: allergies, current medications, past family history, past medical history, past social history, past surgical history and problem list     OBJECTIVE:     Vital signs in last 24 hours: There were no vitals filed for this visit  Laboratory Results:   Recent Labs (last 2 months):   No visits with results within 2 Month(s) from this visit  Latest known visit with results is:   Appointment on 08/11/2022   Component Date Value   • TSH 3RD GENERATON 08/11/2022 2 400    • Vit D, 25-Hydroxy 08/11/2022 22 7 (L)      I have personally reviewed all pertinent laboratory/tests results  Suicide/Homicide Risk Assessment:    Risk of Harm to Self:  Protective Factors: no current suicidal ideation, access to mental health treatment, being a parent, being , compliant with medications, compliant with mental health treatment, connection to community  Based on today's assessment, Mercedes Castro presents the following risk of harm to self: none    Risk of Harm to Others:  Based on today's assessment, Cecy presents the following risk of harm to others: none    The following interventions are recommended: therapy appointment in 4 weeks    Medications Risks/Benefits:      Risks, Benefits And Possible Side Effects Of Medications:    Discussed risks and benefits of treatment with patient including :N/A     Controlled Medication Discussion:     Not applicable    Treatment Plan:    Due for update/Updated:   no  Last treatment plan done 7/21/22 by paulino  Treatment Plan due on 1/21/23  SUSI Grimaldo 11/22/22    This note was shared with patient     Visit Time    Visit Start Time: 685  Visit Stop Time: 833  Total Visit Duration: 15 minutes

## 2022-11-30 ENCOUNTER — TELEPHONE (OUTPATIENT)
Dept: NEUROLOGY | Facility: CLINIC | Age: 47
End: 2022-11-30

## 2022-11-30 NOTE — TELEPHONE ENCOUNTER
Message left from pharmacy that [facility] needs PA through her [address] [place] [address]   FK#[phone] and the phone # to call is   [phone]  # [phone]

## 2022-11-30 NOTE — TELEPHONE ENCOUNTER
Chart reviewed:  Per media, Emgality PA  on 10/26/2022  Need to know if pt had reduction in # of migraines day per month    Called pt and advised of the above  States that prior emgality, she had daily migraines  Now, <5 migraines per month     PA renewed on CMM   (Key: [id number])    Awaiting determination

## 2022-12-05 NOTE — TELEPHONE ENCOUNTER
Per M CINHVX:[id number];CLARENCE:DENISE; Review Type:Prior Auth; Coverage Start Date:10/31/2022; Coverage End Date:12/31/2099    [facility] pharmacy , spoke w/ Meenakshi Siegel and made aware of the approval

## 2022-12-20 ENCOUNTER — OFFICE VISIT (OUTPATIENT)
Dept: SLEEP CENTER | Facility: CLINIC | Age: 47
End: 2022-12-20

## 2022-12-20 VITALS
HEIGHT: 65 IN | WEIGHT: 168.4 LBS | HEART RATE: 80 BPM | BODY MASS INDEX: 28.06 KG/M2 | SYSTOLIC BLOOD PRESSURE: 110 MMHG | DIASTOLIC BLOOD PRESSURE: 60 MMHG | OXYGEN SATURATION: 98 %

## 2022-12-20 DIAGNOSIS — G47.52 REM SLEEP BEHAVIOR DISORDER: Primary | ICD-10-CM

## 2022-12-20 DIAGNOSIS — R51.9 DAILY HEADACHE: ICD-10-CM

## 2022-12-20 DIAGNOSIS — G47.19 EXCESSIVE DAYTIME SLEEPINESS: ICD-10-CM

## 2022-12-20 DIAGNOSIS — G20 EARLY-ONSET PARKINSON'S DISEASE (HCC): ICD-10-CM

## 2022-12-20 DIAGNOSIS — G25.81 RLS (RESTLESS LEGS SYNDROME): ICD-10-CM

## 2022-12-20 DIAGNOSIS — R06.83 SNORING: ICD-10-CM

## 2022-12-20 PROBLEM — G20.A1 EARLY-ONSET PARKINSON'S DISEASE: Status: ACTIVE | Noted: 2022-12-20

## 2022-12-20 NOTE — PROGRESS NOTES
Consultation - [address], 1975, MRN: [id number]    12/20/2022        Reason for Consult / Principal Problem:  Non-restorative sleep  Parasomnia  Restless Legs Syndrome  Evaluation of possible Obstructive Sleep Apnea     Thank you for the opportunity of participating in the evaluation and care of this patient in the Sleep Clinic at [facility]  Subjective:     HPI: Ilene Eavns is a 50y o  year old female  She presents for a consultation regarding poor sleep, as referred by her headache specialist   She is treated for migraines  She was also  recently diagnosed with early onset Parkinson's by Dr Elda Mccarthy at [facility]  She is adopted, so she is unsure of her family history  She began with a dystonia in her hand and also some fine tremor  She was also noted to have a decreased hand swing and slight gait abnormality  She began treatment with sinemet  She has some mild restless leg symptoms in the evening  Her  notices kicking while she is sleeping, which she is not aware she is having  She yells in her sleep  There is no significant snoring noted, however, she makes a whispering noise while sleeping  She does not feel rested and feels tired during the day, especially in the afternoon  She tried use of trazodone to help with sleep, but felt that her Parkinson's symptoms were worse the following morning      Comorbid conditions:  Overweight  Early onset Parkinson's disease  Anxiety  Depression    Sleep Study Results:  No prior sleep studies    Review of Systems      Genitourinary none   Cardiology ankle/leg swelling   Gastrointestinal none   Neurology frequent headaches, need to move extremities, muscle weakness, numbness/tingling of an extremity, forgetfulness, poor concentration or confusion,  and balance problems   Constitutional fatigue, excessive sweating at night and weight change Integumentary rash or dry skin   Psychiatry anxiety and depression   Musculoskeletal joint pain, muscle aches, back pain and legs twitching/jerking   Pulmonary none   ENT none   Endocrine excessive thirst   Hematological none       Employment:  She currently works full time as a , working between the hours of 8:00am-5:30pm, occasionally works later - - with some weekend hours    Sleep Schedule:       Bedtime:  Goes to bed between 8:00-8:30pm and tries to fall asleep by 9:00-9:30pm      Latency:  30-60 minutes      Wakeup time:  4:30am-5:00am, gets up early to work out    Awakenings:       Frequency:  2 times per night      Causes:  Repositioning, noises, /dog, unknown causes      Duration: This varies, can be a few minutes to an hour    Daytime Sleepiness / Inappropriate Sleep:       Most severe: In the afternoons       Naps :  She takes a nap on weekend days      Time:  After lunch - typically around 1:00pm      Duration:   One hour, naps are refreshing       Inappropriate drowsiness / sleep:  She becomes drowsy and may doze with sedentary activities, not at work    Snoring:  She is not aware of snoring, she makes a whispering sound    Apnea: No witnessed apnea    Change in Weight:  She has gained approximately 10 lbs over the past year  She does not feel she has changed eating habits or exercise significantly to affect weight gain  Restless Leg Syndrome:  She has clinical symptoms consistent with possible periodic limb movement disorder that include kicking while she is sleeping  There is also some yelling during sleep, associated with kicking  Other Complaints:  She talks and yells out in her sleep  She has walked in her sleep in the past, but is unsure of recent events  No reports of sleep paralysis or hallucinations surrounding sleep  She was getting frequent headaches  She awakens with headaches approximately 2 times per week    Headaches vary in length of time symptoms are present  She has been diagnosed with migraines  Migraines have improved with Emgality  No reports of bruxism  Social History:      Caffeine:  Green tea in the am and coffee later in the am, no caffeine after noon       Tobacco:   reports that she has never smoked  She has never used smokeless tobacco      E-cig/Vaping:    E-Cigarette/Vaping   • E-Cigarette Use Never User       E-Cigarette/Vaping Substances   • Nicotine No    • THC No    • CBD No    • Flavoring No    • Other No    • Unknown No          Alcohol:   reports current alcohol use of about 2 0 standard drinks per week  One glass of wine or less      Drugs:   reports no history of drug use  The review of systems and following portions of the patient's history were reviewed and updated as appropriate: allergies, current medications, past family history, past medical history, past social history, past surgical history and problem list         Objective:       Vitals:    12/20/22 0741   BP: 110/60   Pulse: 80   SpO2: 98%   Weight: 76 4 kg (168 lb 6 4 oz)   Height: 5' 5" (1 651 m)     Body mass index is 28 02 kg/m²  Neck Circumference: 13 5  [place] Sleepiness Scale:  Total score: 11      Current Outpatient Medications:   •  carbidopa-levodopa (SINEMET)  mg per tablet, , Disp: , Rfl:   •  cholecalciferol (VITAMIN D3) 1,000 units tablet, Take 1 tablet (1,000 Units total) by mouth daily, Disp: 30 tablet, Rfl: 3  •  EPINEPHrine (EPIPEN) 0 3 mg/0 3 mL SOAJ, inject 0 3 milliliters intramuscularly immediately for ALLERGIC REACTION, Disp: , Rfl:   •  Galcanezumab-gnlm 120 MG/ML SOAJ, Inject 120 mg under the skin every 30 (thirty) days, Disp: 1 mL, Rfl: 11  •  hydrOXYzine HCL (ATARAX) 25 mg tablet, Take 1 tablet (25 mg total) by mouth 2 (two) times a day as needed for anxiety (anxiety or insomnia), Disp: 90 tablet, Rfl: 1  •  magnesium Oxide (MAG-OX) 400 mg TABS, Take 400 mg by mouth in the morning, Disp: , Rfl:   •  Multiple Vitamin (MULTIVITAMIN) capsule, Take 1 capsule by mouth daily, Disp: , Rfl:   •  rizatriptan (MAXALT-MLT) 10 MG disintegrating tablet, take 1 tablet by mouth AT MIGRAINE ONSET CAN REPEAT ONCE IN 2 HOURS  MAX 2 TO 3 DAYS A WEEK, Disp: 12 tablet, Rfl: 3  •  norethindrone (MICRONOR) 0 35 MG tablet, Take 1 tablet (0 35 mg total) by mouth daily, Disp: 90 tablet, Rfl: 3    Physical Exam  General Appearance:   Alert, cooperative, no distress, appears stated age     Head:   Normocephalic, without obvious abnormality, atraumatic     Eyes:   Conjunctiva/corneas clear          Nose:  Nares normal, septum midline, mucosa normal, no drainage or sinus tenderness           Throat:  Lips, teeth and gums normal; tongue normal in size and midline in position; mucosa moist with short A/P diameter, uvula normal, tonsils absent, Mallampati class 3-4       Neck:  Supple, symmetrical, trachea midline, no adenopathy; no thyromegaly noted, no carotid bruit or JVD     Lungs:      Clear to auscultation bilaterally, respirations unlabored     Heart:   Regular rate and rhythm, S1 and S2 normal, no murmur, rub or gallop       Extremities:  Extremities normal, atraumatic, no cyanosis and trace edema in LE bilaterally, wearing compression socks       Skin:  Skin color, texture, turgor normal, no rashes or lesions       Neurologic:  No focal deficits noted  ASSESSMENT / PLAN     1  REM sleep behavior disorder  RBD Diagnostic Sleep Study      2  Early-onset Parkinson's disease ([facility] [address] )  RBD Diagnostic Sleep Study      3  RLS (restless legs syndrome)  Ambulatory Referral to Sleep Medicine    RBD Diagnostic Sleep Study      4  Snoring  Ambulatory Referral to Sleep Medicine    RBD Diagnostic Sleep Study      5  Daily headache  Ambulatory Referral to Sleep Medicine    RBD Diagnostic Sleep Study            Counseling / Coordination of Care  Total clinic time spent today 55 minutes   Greater than 50% of total time was spent with the patient and / or family counseling and / or coordination of care  A description of the counseling / coordination of care:     diagnostic results, instructions for management, risk factor reductions, prognosis, patient and family education, impressions, risks and benefits of treatment options and importance of compliance with treatment    We discussed her recent and current symptoms  We discussed parasomnias, possibly early RBD  There may be a component of restless legs/PLMD   A diagnostic sleep study with montage for RBD is needed to evaluate sleep and excessive daytime sleepiness  A home study is not appropriate, as it will not evaluate these concerns and symptoms are not consistent with DYLON  She will schedule testing and will plan to return to review results and plan of treatment  We discussed avoiding hydroxyzine or ZZquil, as they can worsen RLS/PLMD     The following instructions have been given to the patient today:    Patient Instructions   1  Schedule diagnostic sleep study   2  Schedule follow up visit to review study results and plan of treatment  3  Avoid hydroxyzine or Zzquil, etc they can worsen restless legs        Nursing Support:  When: Monday through Friday 7A-5PM except holidays  Where: Our direct line is [phone]  If you are having a true emergency please call 911  In the event that the line is busy or it is after hours please leave a voice message and we will return your call  Please speak clearly, leaving your full name, birth date, best number to reach you and the reason for your call  Medication refills: We will need the name of the medication, the dosage, the ordering provider, whether you get a 30 or 90 day refill, and the pharmacy name and address  Medications will be ordered by the provider only  Nurses cannot call in prescriptions  Please allow 7 days for medication refills  Physician requested updates:  If your provider requested that you call with an update after starting medication, please be ready to provide us the medication and dosage, what time you take your medication, the time you attempt to fall asleep, time you fall asleep, when you wake up, and what time you get out of bed  Sleep Study Results: We will contact you with sleep study results and/or next steps after the physician has reviewed your testing  Odilia Davison, [address] [place] [address]      Portions of the record may have been created with voice recognition software  Occasional wrong word or "sound a like" substitutions may have occurred due to the inherent limitations of voice recognition software  Read the chart carefully and recognize, using context, where substitutions have occurred

## 2022-12-20 NOTE — PATIENT INSTRUCTIONS
1  Schedule diagnostic sleep study   2  Schedule follow up visit to review study results and plan of treatment  3  Avoid hydroxyzine or Zzquil, etc they can worsen restless legs        Nursing Support:  When: Monday through Friday 7A-5PM except holidays  Where: Our direct line is [phone]  If you are having a true emergency please call 911  In the event that the line is busy or it is after hours please leave a voice message and we will return your call  Please speak clearly, leaving your full name, birth date, best number to reach you and the reason for your call  Medication refills: We will need the name of the medication, the dosage, the ordering provider, whether you get a 30 or 90 day refill, and the pharmacy name and address  Medications will be ordered by the provider only  Nurses cannot call in prescriptions  Please allow 7 days for medication refills  Physician requested updates: If your provider requested that you call with an update after starting medication, please be ready to provide us the medication and dosage, what time you take your medication, the time you attempt to fall asleep, time you fall asleep, when you wake up, and what time you get out of bed  Sleep Study Results: We will contact you with sleep study results and/or next steps after the physician has reviewed your testing

## 2022-12-21 ENCOUNTER — TELEPHONE (OUTPATIENT)
Dept: NEUROLOGY | Facility: CLINIC | Age: 47
End: 2022-12-21

## 2022-12-21 NOTE — TELEPHONE ENCOUNTER
Called patient and I rescheduled her 12/30/22 appointment to 04/14/23 at 07:30am in the  with [place] via virtual  Appointment has been placed on the wait list

## 2023-01-10 ENCOUNTER — TELEMEDICINE (OUTPATIENT)
Dept: BEHAVIORAL/MENTAL HEALTH CLINIC | Facility: CLINIC | Age: 48
End: 2023-01-10

## 2023-01-10 ENCOUNTER — HOSPITAL ENCOUNTER (OUTPATIENT)
Dept: MAMMOGRAPHY | Facility: CLINIC | Age: 48
Discharge: HOME | End: 2023-01-10

## 2023-01-10 VITALS — HEIGHT: 65 IN | WEIGHT: 168 LBS | BODY MASS INDEX: 27.99 KG/M2

## 2023-01-10 DIAGNOSIS — Z12.31 ENCOUNTER FOR SCREENING MAMMOGRAM FOR BREAST CANCER: ICD-10-CM

## 2023-01-10 DIAGNOSIS — F41.9 ANXIETY: Primary | ICD-10-CM

## 2023-01-10 NOTE — PSYCH
This note was not shared with the patient due to this is a psychotherapy note      Virtual Regular Visit    Verification of patient location:    Patient is located in the following state in which I hold an active license [place]      Assessment/Plan:    Problem List Items Addressed This Visit        Other    Anxiety - Primary       Goals addressed in session: Goal 1          Reason for visit is   Chief Complaint   Patient presents with   • Virtual Regular Visit        Encounter provider CARLOS ALBERTO [facility]    Provider located at [address] [place] [id number] [phone]       The patient was identified by name and date of birth  Jameson Resendiz was informed that this is a telemedicine visit and that the visit is being conducted [place]  She agrees to proceed     My office door was closed  No one else was in the room  She acknowledged consent and understanding of privacy and security of the video platform  The patient has agreed to participate and understands they can discontinue the visit at any time  Patient is aware this is a billable service  Subjective  Jameson Resendiz is a 50 y o  female   Psychotherapy Provided: Individual Psychotherapy 28 minutes     Length of time in session: 28 minutes, follow up in 6-8 week    Goals addressed in session: Goal 1     Pain:      none    0    Current suicide risk : Low     Met with Cecy Tapia says she has noticed a slight increase in her anxiety since she stopped taking the Lexapro; however, feels she has been doing fairly well overall  She shared her rationale for no longer taking the Lexapro due to concern with possible side effects, not sure if Parkinson's related  Validation was provided, reinforcing to be her own advocate for care     She is reporting some anxiety that occurs with work; however, she's been setting limits with herself and others for her emotional wellness  She is reporting some irritability at times and poor sleep but feels her lack of sleep due to her dogs injury could be contributing to this  We discussed some holistic approaches for her emotional wellness including daily exercise and light therapy lux [id number] to improve mood and sleep  Cecy does express the need for more self care,  follow through with scheduling appts for herself  We discussed some small tasks to begin meeting those goals, reinforcing how this can help improve her overall wellness  Her treatment plan was reviewed  Some positive progress toward goals  We discussed her ongoing areas of need and revisions were made to her treatment plan as appropriate  Cecy wishes to continue therapy x 6-8 weeks for additional support  She is encouraged to call the office if a sooner appt is needed  Mental status:  Appearance calm and cooperative , adequate hygiene and grooming and good eye contact    Mood Slightly anxious   Affect affect was constricted   Speech a normal rate and volume   Thought Processes coherent/organized and goal-directed   Hallucinations no hallucinations present    Thought Content no delusions   Abnormal Thoughts no suicidal thoughts  and no homicidal thoughts    Orientation  oriented to person and place and time   Remote Memory short term memory intact and long term memory intact   Attention Span concentration intact   Intellect Appears to be of Average Intelligence   Fund of Knowledge displays adequate knowledge of current events   Insight fair   Judgement fair   Muscle Strength Unable to assess due to virtual session   Language no difficulty naming common objects   Pain none   Pain Scale 0           [address]: Diagnosis and Treatment Plan explained to [place] relates understanding diagnosis and is agreeable to Treatment Plan   Yes     Visit start and stop times:    01/10/23  Start Time: 5873  Stop Time: 1726  Total Visit Time: 28 minutes

## 2023-01-12 NOTE — BH TREATMENT PLAN
Petty Reaganish  1975       Date of Initial Treatment Plan: 2/25/22  Date of Current Treatment Plan: 01/12/23    Treatment Plan Number 3    Strengths/Personal Resources for Self Care: employed, loves her family, using meditation and exercise as wellness tools, enjoys traveling    Diagnosis:   1  Anxiety            Area of Needs: maintain progress      Long Term Goal 1: maintain progress, support    Target Date: 6/10/22  Completion Date: TBD         Short Term Objectives for Goal 1: see objectives below   1 1  Cecy will continue to demonstrate openness to engage in therapy as evidenced by regular attendance and communication of her thoughts and feelings  1 2  Cecy will continue to express her feelings about triggers to her depression and anxiety  1 3  Cecy will continue to use and develop mindfulness and relaxation techniques to improve her mood and level of functioning  1 4  Cecy will continue to use and develop cognitive coping mechanisms to dismantle unhelpful thoughts to help alleviate symptoms of anxiety and depression  1 5  Cecy will continue to communicate her thoughts and feelings to supports in her life  1 6  Cecy will remain compliant with psychiatric medications  GOAL 1: Modality: Individual x 6-8 weeks, more as needed   Completion Date TBD and The person(s) responsible for carrying out the plan is  Bambi Chow, Olamide Loza, and Stephanie Mantilla  Modalities: CBT, psychoeducation, mindfulness, stress management skills,  DBT skills, guided imagery, emotional needs meeting  Behavioral Health Treatment Plan [facility]: Diagnosis and Treatment Plan explained to Isis Samme relates understanding diagnosis and is agreeable to Treatment Plan         Client Comments : Please share your thoughts, feelings, need and/or experiences regarding your treatment plan:     Petty Reaganish, 1975, actively participated in the review and update of this treatment plan her session today  Gerri Jacob  provided verbal consent on 1/10/23 at 1720 PM due to virtual session  The treatment plan was transcribed into the Electronic Health Record at a later time

## 2023-01-13 ENCOUNTER — PATIENT MESSAGE (OUTPATIENT)
Dept: FAMILY MEDICINE CLINIC | Facility: CLINIC | Age: 48
End: 2023-01-13

## 2023-01-13 DIAGNOSIS — G43.009 MIGRAINE WITHOUT AURA AND WITHOUT STATUS MIGRAINOSUS, NOT INTRACTABLE: ICD-10-CM

## 2023-01-13 RX ORDER — RIZATRIPTAN BENZOATE 10 MG/1
TABLET, ORALLY DISINTEGRATING ORAL
Qty: 12 TABLET | Refills: 3 | Status: SHIPPED | OUTPATIENT
Start: 2023-01-13

## 2023-02-13 ENCOUNTER — TELEMEDICINE (OUTPATIENT)
Dept: PSYCHIATRY | Facility: CLINIC | Age: 48
End: 2023-02-13

## 2023-02-13 DIAGNOSIS — F41.9 ANXIETY: ICD-10-CM

## 2023-02-13 DIAGNOSIS — F33.0 DEPRESSION, MAJOR, RECURRENT, MILD (HCC): Primary | ICD-10-CM
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
## 2023-03-10 ENCOUNTER — HOSPITAL ENCOUNTER (OUTPATIENT)
Dept: SLEEP CENTER | Facility: CLINIC | Age: 48
Discharge: HOME | End: 2023-03-10

## 2023-03-10 DIAGNOSIS — R51.9 DAILY HEADACHE: ICD-10-CM

## 2023-03-10 DIAGNOSIS — G47.52 REM SLEEP BEHAVIOR DISORDER: ICD-10-CM

## 2023-03-10 DIAGNOSIS — R06.83 SNORING: ICD-10-CM

## 2023-03-10 DIAGNOSIS — G25.81 RLS (RESTLESS LEGS SYNDROME): ICD-10-CM

## 2023-03-10 DIAGNOSIS — G20 EARLY-ONSET PARKINSON'S DISEASE (HCC): ICD-10-CM

## 2023-03-11 NOTE — PROGRESS NOTES
Sleep Study Documentation    Pre-Sleep Study       Sleep testing procedure explained to patient:YES    Patient napped prior to study:NO    Caffeine:Dayshift worker after 12PM   Caffeine use:YES- hot cocoa  6 ounces    Alcohol:Dayshift workers after 5PM: Alcohol use:NO    Typical day for patient:YES       Study Documentation    Sleep Study Indications: Chronic or AM headache, Unrefreshing sleep, Impaired concentration/memory, RLS, Parasomnia, Excessive Daytime Sleepiness, Mood Disturbance, REM sleep behavior disorder    Sleep Study: Diagnostic   Snore:Mild  Supplemental O2: no    O2 flow rate (L/min) range   O2 flow rate (L/min) final   Minimum SaO2 91%  Baseline SaO2 98%    EKG abnormalities: no     EEG abnormalities: no    Sleep Study Recorded < 2 hours: N/A    Sleep Study Recorded > 2 hours but incomplete study: N/A    Sleep Study Recorded 6 hours but no sleep obtained: NO    Patient classification: employed       Post-Sleep Study    Medication used at bedtime or during sleep study:YES other prescription medications    Patient reports time it took to fall asleep:20 to 30 minutes    Patient reports waking up during study:3 or more times  Patient reports returning to sleep in greater than 30 minutes  Patient reports sleeping 4 to 6 hours without dreaming  Patient reports sleep during study:typical    Patient rated sleepiness: Somewhat sleepy or tired    PAP treatment:no

## 2023-03-14 ENCOUNTER — TELEMEDICINE (OUTPATIENT)
Dept: BEHAVIORAL/MENTAL HEALTH CLINIC | Facility: CLINIC | Age: 48
End: 2023-03-14

## 2023-03-14 DIAGNOSIS — F41.9 ANXIETY: Primary | ICD-10-CM

## 2023-03-14 NOTE — BH CRISIS PLAN
Client Name: Joya Corral       Client YOB: 1975  : 1975    Treatment Team (include name and contact information):     Psychotherapist: She Charles  [phone]    Psychiatrist: Hans GOLDMAN   [phone]   Release of information completed: no   (part of [facility])    Healthcare Provider   Name: Ozawkie EDIL Portillo   Address[facility] [address] , [address], [address]              Telephone Number: [phone]       Type of Plan   * Child plans (children 15 yo and younger) must be completed and signed by the child's legal guardian   * Plans for all individuals 15 yo and above must be signed by the client  Plan Type: adolescent/adult (14 and over) Initial      My Personal Strengths are (in the client's own words):  "I like to learn", "I am pretty good in a crisis"    The stressors and triggers that may put me at risk are:  being physically tired, being hungry and other (describe) physical health, lack of respect for my time    Coping skills I can use to keep myself calm and safe:  [place]/meditate and Other (describe) breathwork, stretching, working out    Coping skills/supports I can use to maintain abstinence from substance use:   n/a    The people that provide me with help and support: (Include name, contact, and how they can help)   Support person #1:   - Carmen Dominguez    * Phone number: [phone]    * How can they help me? Take me to the bookstore, give me a hug, get my dog for me, talk it out     Support person #2:  Daughter-- Lore Carson    * Phone number: (in cell phone)    * How can they help me? Good listener, fun to hang out with     Support person #3: Son-  Elijah Chisholm III    * Phone number: (in cell phone)    * How can they help me?  He can make me laugh    In the past, the following has helped me in times of crisis:    Breathing exercises (or other mindfulness-based activities) and Praying or meditating      If it is an emergency and you need immediate help, call 9-1-1    If there is a possibility of danger to yourself or others, call the following crisis hotline resources:     Adult Crisis Numbers  Suicide Prevention Hotline - Dial 9-8-8  [facility]: Les Smiley 13: [address]: [address]: [address] [place] [address] Avenue: [address] [place] [address] Street: [address] [place] [address] Avenue: [phone]      In the event your feelings become unmanageable, and you cannot reach your support system, you will call 911 immediately or go to the nearest hospital emergency room

## 2023-03-17 NOTE — PSYCH
This note was not shared with the patient due to this is a psychotherapy note      Virtual Regular Visit    Verification of patient location:    Patient is located in the following state in which I hold an active license [place]      Assessment/Plan:    Problem List Items Addressed This Visit        Other    Anxiety - Primary       Goals addressed in session: Goal 1          Reason for visit is No chief complaint on file  Encounter provider Jose Garzon    Provider located at [address] [place] [id number] [phone]      The patient was identified by name and date of birth  Madai Joe was informed that this is a telemedicine visit and that the visit is being conducted Bundle  She agrees to proceed     My office door was closed  No one else was in the room  She acknowledged consent and understanding of privacy and security of the video platform  The patient has agreed to participate and understands they can discontinue the visit at any time  Patient is aware this is a billable service  Subjective  Madai Joe is a 50 y o  female   Behavioral Health Psychotherapy Progress Note    Psychotherapy Provided: Individual Psychotherapy     1  Anxiety            Goals addressed in session: Goal 1     DATA: Met with Cecy Sam reports doing fairly well since her last session  She is reporting poor energy, low motivation at times  Restlessness at night  She feels this is related to her dx of Parkinson's  She expressed her thoughts and feelings about this, sharing how it is difficult to plan ahead sometimes not knowing how she will be feeling  Her family has been supportive  She feels her job has been going fairly well  Reinforced limit setting with herself and others for her emotional wellness  She does report some anxiety-induced unhelpful thoughts at times  She uses exercise and meditation as forms of coping, self care  She was introduced to Thought Defusion in session today as another mindfulness strategy to help with stuck thoughts  Initial crisis plan developed in session today  During this session, this clinician used the following therapeutic modalities: Client-centered Therapy, Mindfulness-based Strategies and Supportive Psychotherapy    Substance Abuse was not addressed during this session  If the client is diagnosed with a co-occurring substance use disorder, please indicate any changes in the frequency or amount of use: na  Stage of change for addressing substance use diagnoses: No substance use/Not applicable    ASSESSMENT:  Madai Joe presents with a euthymic/normal mood  her affect is Normal range and intensity, which is congruent, with her mood and the content of the session  The client has made progress on their goals  Madai Joe presents with a none risk of suicide, none risk of self-harm, and none risk of harm to others  For any risk assessment that surpasses a "low" rating, a safety plan must be developed  A safety plan was indicated: no  If yes, describe in detail na    PLAN: Between sessions, Madai Joe will continue to use and develop, mindfulness strategies  Self care  At the next session, the therapist will use Client-centered Therapy, Mindfulness-based Strategies and Supportive Psychotherapy to address stressors, anxiety  Behavioral Health Treatment Plan and Discharge Planning: Madai Joe is aware of and agrees to continue to work on their treatment plan  They have identified and are working toward their discharge goals   yes    Visit start and stop times:    3/14/23  Start Time: 1642  Stop Time: 1732  Total Visit Time: 50 minutes

## 2023-03-20 ENCOUNTER — TELEPHONE (OUTPATIENT)
Dept: SLEEP CENTER | Facility: CLINIC | Age: 48
End: 2023-03-20

## 2023-03-20 NOTE — TELEPHONE ENCOUNTER
----- Message from [place]ila [place], [place] sent at 3/18/2023  1:03 PM EDT -----  DYLON was not indicated during testing  There was no evidence of REM behavior disorder during this test    There were some limb movements noted, however, they were insignificant  Recommend follow up, as scheduled, to review results and plan of treatment

## 2023-03-20 NOTE — TELEPHONE ENCOUNTER
Called patient and advised of sleep study results and Chanel's recommendation for follow up  Patient has appointment scheduled 3/31/23

## 2023-04-04 ENCOUNTER — TELEPHONE (OUTPATIENT)
Dept: PSYCHIATRY | Facility: CLINIC | Age: 48
End: 2023-04-04

## 2023-04-04 ENCOUNTER — TELEPHONE (OUTPATIENT)
Dept: BEHAVIORAL/MENTAL HEALTH CLINIC | Facility: CLINIC | Age: 48
End: 2023-04-04

## 2023-04-04 DIAGNOSIS — F33.0 DEPRESSION, MAJOR, RECURRENT, MILD (HCC): Primary | ICD-10-CM

## 2023-04-04 RX ORDER — ESCITALOPRAM OXALATE 5 MG/1
5 TABLET ORAL DAILY
Qty: 90 TABLET | Refills: 0 | Status: SHIPPED | OUTPATIENT
Start: 2023-04-04

## 2023-05-15 ENCOUNTER — TELEMEDICINE (OUTPATIENT)
Dept: PSYCHIATRY | Facility: CLINIC | Age: 48
End: 2023-05-15

## 2023-05-15 DIAGNOSIS — F33.0 DEPRESSION, MAJOR, RECURRENT, MILD (HCC): Primary | ICD-10-CM

## 2023-05-15 DIAGNOSIS — F41.9 ANXIETY: ICD-10-CM

## 2023-05-15 NOTE — PSYCH
Virtual Regular Visit    Verification of patient location:    Patient is located in the following state in which I hold an active license [place]    Problem List Items Addressed This Visit        Other    Depression, major, recurrent, mild ([facility] [address] ) - Primary    Anxiety          Encounter provider SUSI San    Provider located at    [address] [place] [address] E  [address] [facility] [address] [phone]    Recent Visits  No visits were found meeting these conditions  Showing recent visits within past 7 days and meeting all other requirements  Today's Visits  Date Type Provider Dept   05/15/23 Telemedicine [facility]   Showing today's visits and meeting all other requirements  Future Appointments  No visits were found meeting these conditions  Showing future appointments within next 150 days and meeting all other requirements         The patient was identified by name and date of birth  Chelsy Savage was informed that this is a telemedicine visit and that the visit is being conducted [employer]  She agrees to proceed     My office door was closed  No one else was in the room  She acknowledged consent and understanding of privacy and security of the video platform  The patient has agreed to participate and understands they can discontinue the visit at any time  Patient is aware this is a billable service       HPI     Current Outpatient Medications   Medication Sig Dispense Refill   • carbidopa-levodopa (SINEMET)  mg per tablet Take 1 tablet by mouth every 6 (six) hours     • cholecalciferol (VITAMIN D3) 1,000 units tablet Take 1 tablet (1,000 Units total) by mouth daily 30 tablet 3   • EPINEPHrine (EPIPEN) 0 3 mg/0 3 mL SOAJ inject 0 3 milliliters intramuscularly immediately for ALLERGIC REACTION     • escitalopram (LEXAPRO) 5 mg tablet Take 1 tablet (5 mg total) by mouth daily 90 tablet 0   • Galcanezumab-gnlm 120 MG/ML SOAJ Inject 120 mg under the skin every 30 (thirty) days 1 mL 11   • hydrOXYzine HCL (ATARAX) 25 mg tablet Take 1 tablet (25 mg total) by mouth 2 (two) times a day as needed for anxiety (anxiety or insomnia) 90 tablet 1   • magnesium Oxide (MAG-OX) 400 mg TABS Take 400 mg by mouth in the morning     • Multiple Vitamin (MULTIVITAMIN) capsule Take 1 capsule by mouth daily     • norethindrone (MICRONOR) 0 35 MG tablet Take 1 tablet (0 35 mg total) by mouth daily (Patient not taking: Reported on 4/14/2023) 90 tablet 3   • rizatriptan (MAXALT-MLT) 10 mg disintegrating tablet take 1 tablet by mouth AT MIGRAINE ONSET CAN REPEAT ONCE IN 2 HOURS  MAX 2 TO 3 DAYS A WEEK 12 tablet 3     No current facility-administered medications for this visit  Review of Systems  Video Exam    There were no vitals filed for this visit  Physical Exam   As a result of this visit, I have referred the patient for further respiratory evaluation  No      VIRTUAL VISIT DISCLAIMER    Chelsy Savage acknowledges that she has consented to an online visit or consultation  She understands that the online visit is based solely on information provided by her, and that, in the absence of a face-to-face physical evaluation by the physician, the diagnosis she receives is both limited and provisional in terms of accuracy and completeness  This is not intended to replace a full medical face-to-face evaluation by the physician  Chelsy Savage understands and accepts these terms      MEDICATION MANAGEMENT NOTE        [address] [place] [address]    Name and Date of Birth:  Chelsy Savage 50 y o  1975 MRN: [id number]    Date of Visit: May 15, 2023    Allergies   Allergen Reactions   • Grass Pollen(K-O-R-T-Swt Channing) Shortness Of Breath     Fresh cut, breathing    • Penicillins Hives   • Codeine GI Intolerance   • Shellfish-Derived Products - Food Allergy Other (See Comments)     Intolerance to scallops -reaction severe vomitting   • Tetracyclines & Related GI Intolerance   • Ancef [Cefazolin] Tachycardia     SUBJECTIVE:    Cecy is seen today for a follow up for Major Depressive Disorder and anxiety  She reports that she continues to do relatively well since the last visit  Patient doing well on the recently reinitiated Lexapro 5 mg daily for anxiety and depression management  One month ago, reported increase in job stress and anxiety, called the office and requested to be put back on Lexapro 5 mg daily and reports considerable improvement ever since  Describes how she handles stress more adequately, work going smoother  Denies any need to increase the Lexapro dosage and will continue current dosage with no side effects reported  Recently completed a sleep study test, showed no sleep apnea, has a follow-up appointment tomorrow with sleep medicine to discuss treatment plan  Mood is euthymic and appropriate during the encounter  Continues to work as a    remains a good support system  Denies any further stressors or needs at this time  Denies SI  She denies any side effects from medications  PLAN:      -Continue Lexapro 5mg daily for anxiety and depression management   PARQ completed including serotonin syndrome, SIADH, worsening depression, suicidality, induction of devi, GI upset, headaches, activation, sexual side effects, sedation, potential drug interactions, and others          Aware of 24 hour and weekend coverage for urgent situations accessed by calling [facility] main practice number  Continue psychotherapy with therapist    Diagnoses and all orders for this visit:    Depression, major, recurrent, mild ([facility] [address] )    Anxiety        Current Outpatient Medications on File Prior to Visit   Medication Sig Dispense Refill   • carbidopa-levodopa (SINEMET)  mg per tablet Take 1 tablet by mouth every 6 (six) hours     • cholecalciferol (VITAMIN D3) 1,000 units tablet Take 1 tablet (1,000 Units total) by mouth daily 30 tablet 3   • EPINEPHrine (EPIPEN) 0 3 mg/0 3 mL SOAJ inject 0 3 milliliters intramuscularly immediately for ALLERGIC REACTION     • escitalopram (LEXAPRO) 5 mg tablet Take 1 tablet (5 mg total) by mouth daily 90 tablet 0   • Galcanezumab-gnlm 120 MG/ML SOAJ Inject 120 mg under the skin every 30 (thirty) days 1 mL 11   • hydrOXYzine HCL (ATARAX) 25 mg tablet Take 1 tablet (25 mg total) by mouth 2 (two) times a day as needed for anxiety (anxiety or insomnia) 90 tablet 1   • magnesium Oxide (MAG-OX) 400 mg TABS Take 400 mg by mouth in the morning     • Multiple Vitamin (MULTIVITAMIN) capsule Take 1 capsule by mouth daily     • norethindrone (MICRONOR) 0 35 MG tablet Take 1 tablet (0 35 mg total) by mouth daily (Patient not taking: Reported on 4/14/2023) 90 tablet 3   • rizatriptan (MAXALT-MLT) 10 mg disintegrating tablet take 1 tablet by mouth AT MIGRAINE ONSET CAN REPEAT ONCE IN 2 HOURS  MAX 2 TO 3 DAYS A WEEK 12 tablet 3     No current facility-administered medications on file prior to visit  Psychotherapy Provided:     Individual psychotherapy provided: Supportive counseling provided  Medication changes discussed with Cecy  Medication education provided to [place]  HPI ROS Appetite Changes and Sleep:     She reports normal sleep, adequate appetite, adequate energy level   Denies homicidal ideation, denies suicidal ideation    Review Of Systems:      HPI ROS:               Medication Side Effects:  denies    Depression (10 worst): 2/10    Anxiety (10 worst): 3/10    Safety concerns (SI, HI, etc): Denies si and hi    Sleep: 5-6 hrs, sleep study appt tomorrow     Energy: fair    Appetite: 2-3 meals    Weight Change: denies        Mental Status Evaluation:    Appearance Adequate hygiene and grooming   Behavior calm and cooperative   Mood euthymic  Depression Scale - 2 of 10 (0 = No depression)  Anxiety Scale - 3 of 10 (0 = No anxiety)   Speech Normal rate and volume   Affect mood-congruent   Thought Processes Goal directed and coherent   Thought Content Does not verbalize delusional material   Associations Tightly connected   Perceptual Disturbances Denies hallucinations and does not appear to be responding to internal stimuli   Risk Potential Suicidal/Homicidal Ideation - No evidence of suicidal or homicidal ideation and patient does not verbalize suicidal or homicidal ideation  Risk of Violence - No evidence of risk for violence found on assessment  Risk of Self Mutilation - No evidence of risk for self mutilation found on assessment   Orientation oriented to person, place, time/date and situation   Memory recent and remote memory grossly intact   Consciousness alert and awake   Attention/Concentration attention span and concentration are age appropriate   Insight intact   Judgement intact   Muscle Strength and Gait normal muscle strength and normal muscle tone, normal gait/station and normal balance   Motor Activity no abnormal movements   Language no difficulty naming common objects, no difficulty repeating a phrase, no difficulty writing a sentence   Fund of Knowledge adequate knowledge of current events  adequate fund of knowledge regarding past history  adequate fund of knowledge regarding vocabulary          Past Medical History:    Past Medical History:   Diagnosis Date   • Anxiety    • Depression    • Food allergy     scallops   • Gall stone     lap ari today 5/8/2019   • Headache, tension-type    • History of migraine    • Memory loss noticed november 2019    Im having issues remembering names, words, etc    • Migraine    • Motion sickness    • PONV (postoperative nausea and vomiting)    • Wears glasses         Past Surgical History:   Procedure Laterality Date   • BREAST CYST ASPIRATION Left     benign-age 11-14   • COLONOSCOPY     • ENDOMETRIAL ABLATION     • HYSTERECTOMY  2015    Partial   • INJECTION ANESTHETIC AGENT TO CERVICAL PLEXUS     • KNEE ARTHROSCOPY Left    • OOPHORECTOMY Right 2016   • OK LAPAROSCOPY SURG CHOLECYSTECTOMY N/A 5/8/2019    Procedure: LAP JULES w/ IOC;  Surgeon: Brisa Bray MD;  Location: [facility];  Service: General   • OK LAPAROSCOPY W/RMVL ADNEXAL STRUCTURES Right 9/16/2016    Procedure:  Stevie Whitta;  Surgeon: So Ortiz DO;  Location: [facility];  Service: Gynecology   • OK LAPS ABD PRTM&OMENTUM DX W/WO [address] [facility] [address] [facility] N/A 9/16/2016    Procedure: LAPAROSCOPY DIAGNOSTIC;  Surgeon: So Ortiz DO;  Location: [facility];  Service: Gynecology   • OK LAPS ABD PRTM&OMENTUM DX W/WO [address] [facility] [address] [facility] N/A 5/8/2019    Procedure: LAPAROSCOPY DIAGNOSTIC;  Surgeon: Brisa Bray MD;  Location: [facility];  Service: General   • REFRACTIVE SURGERY Bilateral    • TONSILLECTOMY     • WISDOM TOOTH EXTRACTION       Allergies   Allergen Reactions   • Grass Pollen(K-O-R-T-Swt Channing) Shortness Of Breath     Fresh cut, breathing    • Penicillins Hives   • Codeine GI Intolerance   • Shellfish-Derived Products - Food Allergy Other (See Comments)     Intolerance to scallops -reaction severe vomitting   • Tetracyclines & Related GI Intolerance   • Ancef [Cefazolin] Tachycardia     Substance Abuse History:    Social History     Substance and Sexual Activity   Alcohol Use Yes   • Alcohol/week: 1 0 standard drink   • Types: 1 Glasses of wine per week    Comment: 2 weekly     Social History     Substance and Sexual Activity   Drug Use No     Social History:    Social History     Socioeconomic History   • Marital status: /Civil Union     Spouse name: Not on file   • Number of children: Not on file   • Years of education: Not on file   • Highest education level: Not on file   Occupational History   • Not on file   Tobacco Use   • Smoking status: Never   • Smokeless tobacco: Never   Vaping Use   • Vaping Use: Never used   Substance and Sexual Activity   • Alcohol use:  Yes     Alcohol/week: 1 0 standard drink     Types: 1 Glasses of wine per week     Comment: 2 weekly   • Drug use: No   • Sexual activity: Yes     Partners: Male     Birth control/protection: Surgical, Male Sterilization     Comment: My  had a vasectomy and I had a partial hysterectomy   Other Topics Concern   • Not on file   Social History Narrative    2 children    Caffeine use     Social Determinants of Health     Financial Resource Strain: Not on file   Food Insecurity: Not on file   Transportation Needs: Not on file   Physical Activity: Not on file   Stress: Not on file   Social Connections: Not on file   Intimate Partner Violence: Not on file   Housing Stability: Not on file     Family Psychiatric History:     Family History   Adopted: Yes   Problem Relation Age of Onset   • Alcohol abuse Father    • Substance Abuse Father    • Diabetes Sister    • Migraines Sister    • No Known Problems Sister    • No Known Problems Daughter    • Cancer Maternal Grandmother    • Alcohol abuse Maternal Grandfather    • Heart disease Maternal Grandfather    • Hypertension Maternal Grandfather    • Ovarian cancer Maternal Aunt    • Migraines Sister    • Breast cancer Neg Hx      History Review: The following portions of the patient's history were reviewed and updated as appropriate: allergies, current medications, past family history, past medical history, past social history, past surgical history and problem list     OBJECTIVE:     Vital signs in last 24 hours: There were no vitals filed for this visit  Laboratory Results:   Recent Labs (last 2 months):   No visits with results within 2 Month(s) from this visit  Latest known visit with results is:   Appointment on 08/11/2022   Component Date Value   • TSH 3RD GENERATON 08/11/2022 2 400    • Vit D, 25-Hydroxy 08/11/2022 22 7 (L)      I have personally reviewed all pertinent laboratory/tests results      Suicide/Homicide Risk Assessment:    Risk of Harm to Self:  Protective Factors: no current suicidal ideation, access to mental health treatment, being a parent, being , compliant with medications, compliant with mental health treatment, good health, good self-esteem, having a desire to be alive  Based on today's assessment, Hector Roblero presents the following risk of harm to self: minimal    Risk of Harm to Others:  Based on today's assessment, Cecy presents the following risk of harm to others: none    The following interventions are recommended: therapy appointment in 2 weeks    Medications Risks/Benefits:      Risks, Benefits And Possible Side Effects Of Medications:    Discussed risks and benefits of treatment with patient including risk of suicidality, serotonin syndrome, increased QTc interval and SIADH related to treatment with antidepressants; Risk of induction of manic symptoms in certain patient populations     Controlled Medication Discussion:     Not applicable    Treatment Plan:    Due for update/Updated:   no  Last treatment plan done 1/10/23  Treatment Plan due on 7/10/23  SUSI Carlton 05/15/23    This note was shared with patient        Visit Time    Visit Start Time: 4  Visit Stop Time: 419  Total Visit Duration: 19 minutes

## 2023-05-16 ENCOUNTER — OFFICE VISIT (OUTPATIENT)
Dept: SLEEP CENTER | Facility: CLINIC | Age: 48
End: 2023-05-16

## 2023-05-16 VITALS
BODY MASS INDEX: 27.66 KG/M2 | WEIGHT: 166 LBS | HEIGHT: 65 IN | DIASTOLIC BLOOD PRESSURE: 62 MMHG | HEART RATE: 86 BPM | SYSTOLIC BLOOD PRESSURE: 112 MMHG | OXYGEN SATURATION: 98 %

## 2023-05-16 DIAGNOSIS — G47.8 NON-RESTORATIVE SLEEP: ICD-10-CM

## 2023-05-16 DIAGNOSIS — G47.00 INSOMNIA, UNSPECIFIED TYPE: Primary | ICD-10-CM

## 2023-05-16 RX ORDER — TRAZODONE HYDROCHLORIDE 50 MG/1
TABLET ORAL
Qty: 30 TABLET | Refills: 5 | Status: SHIPPED | OUTPATIENT
Start: 2023-05-16

## 2023-05-16 NOTE — ASSESSMENT & PLAN NOTE
Diagnostic sleep study with extended monitoring for REM behavior disorder completed 3/10/23 and evidence of REM behavior disorder was not noted    Plan:  Monitor for ongoing or worsening symptoms and re-evaluation may be needed

## 2023-05-16 NOTE — PROGRESS NOTES
Progress Note - [address] [address] [place] 50 y o  female   :1975, MRN: [id number]  2023          Follow Up Evaluation / Problem:  Insomnia - sleep maintenance  Non-restorative sleep        Thank you for the opportunity of participating in the evaluation and care of this patient in the Sleep Clinic at [facility]  HPI: Mary Mom is a 50y o  year old female  She presents for follow up to review results of a diagnostic sleep study  She was referred by her neurologist for evaluation of poor sleep  She reports that she is typically able to initiate sleep, but wakes up frequently during the night and has difficulty returning to sleep  Her  reported kicking and what appears to be dream enactment  She completed a diagnostic sleep study with extended monitoring for REM behavior disorder    Comorbid conditions:  Migraines, early onset Parkinson's, anxiety, depression    Current Outpatient Medications:   •  carbidopa-levodopa (SINEMET)  mg per tablet, Take 1 tablet by mouth every 6 (six) hours, Disp: , Rfl:   •  cholecalciferol (VITAMIN D3) 1,000 units tablet, Take 1 tablet (1,000 Units total) by mouth daily, Disp: 30 tablet, Rfl: 3  •  EPINEPHrine (EPIPEN) 0 3 mg/0 3 mL SOAJ, inject 0 3 milliliters intramuscularly immediately for ALLERGIC REACTION, Disp: , Rfl:   •  escitalopram (LEXAPRO) 5 mg tablet, Take 1 tablet (5 mg total) by mouth daily, Disp: 90 tablet, Rfl: 0  •  Galcanezumab-gnlm 120 MG/ML SOAJ, Inject 120 mg under the skin every 30 (thirty) days, Disp: 1 mL, Rfl: 11  •  hydrOXYzine HCL (ATARAX) 25 mg tablet, Take 1 tablet (25 mg total) by mouth 2 (two) times a day as needed for anxiety (anxiety or insomnia), Disp: 90 tablet, Rfl: 1  •  magnesium Oxide (MAG-OX) 400 mg TABS, Take 400 mg by mouth in the morning, Disp: , Rfl:   •  Multiple Vitamin (MULTIVITAMIN) capsule, Take 1 capsule by mouth daily, Disp: ", Rfl:   •  rizatriptan (MAXALT-MLT) 10 mg disintegrating tablet, take 1 tablet by mouth AT MIGRAINE ONSET CAN REPEAT ONCE IN 2 HOURS  MAX 2 TO 3 DAYS A WEEK, Disp: 12 tablet, Rfl: 3  •  traZODone (DESYREL) 50 mg tablet, Take 1/2 to one tablet daily at bedtime, as needed for insomnia, Disp: 30 tablet, Rfl: 5  •  norethindrone (MICRONOR) 0 35 MG tablet, Take 1 tablet (0 35 mg total) by mouth daily (Patient not taking: Reported on 4/14/2023), Disp: 90 tablet, Rfl: 3    How likely are you to doze off or fall asleep in the following situations, in contrast to feeling just tired? Sitting and reading: Slight chance of dozing  Watching TV: Moderate chance of dozing  Sitting, inactive in a public place (e g  a theatre or a meeting): Slight chance of dozing  As a passenger in a car for an hour without a break: Slight chance of dozing  Lying down to rest in the afternoon when circumstances permit: Moderate chance of dozing  Sitting and talking to someone: Would never doze  Sitting quietly after a lunch without alcohol: Would never doze  In a car, while stopped for a few minutes in traffic: Would never doze  Total score: 7              Vitals:    05/16/23 1406   BP: 112/62   BP Location: Right arm   Patient Position: Sitting   Cuff Size: Standard   Pulse: 86   SpO2: 98%   Weight: 75 3 kg (166 lb)   Height: 5' 5\" (1 651 m)       Body mass index is 27 62 kg/m²  EPWORTH SLEEPINESS SCORE  Total score: 7      Past History Since Last Sleep Center Visit:   She denies any significant changes to her health since her last visit  She continues to wake up during the night and has difficulty returning to sleep  She does not feel rested and feels tired during the day  Results of the diagnostic sleep study, completed on 3/10/23, are as follows:  INTERPRETATION:  1  This test does not support the diagnosis of obstructive sleep apnea as the apnea hypopnea index (AHI) was normal (Normal is less than 5)  2   Normal baseline " oxygen saturation  3  Sleep efficiency was low  Decreased Stage N1 sleep (light sleep) was present    Stage N3 sleep (deep sleep) was decreased  REM sleep percentage was normal  Mild sleep fragmentation noted  4  The number of periodic movements during sleep was insignificant  5  EKG showed normal sinus rhythm  6  A REM sleep disorder montage was used  No evidence of REM sleep behavior disorder noted       RECOMMENDATION:  Clinical correlation required  If this test is felt to represent a false negative result with regard to REM sleep behavior disorder  The review of systems and following portions of the patient's history were reviewed and updated as appropriate: allergies, current medications, past family history, past medical history, past social history, past surgical history, and problem list         OBJECTIVE    Physical Exam:     General Appearance:    Neurologic:   Alert, cooperative, no distress, appears stated age    Mild tremor of voice noted         ASSESSMENT / PLAN    1  Insomnia, unspecified type  traZODone (DESYREL) 50 mg tablet    sleep maintenance insomnia            Counseling / Coordination of Care  I have spent a total time of 25 minutes on 05/16/23 in caring for this patient including Risks and benefits of tx options, Instructions for management, Patient and family education, Risk factor reductions, Impressions, Documenting in the medical record, Reviewing / ordering tests, medicine, procedures   and Obtaining or reviewing history    Today, we reviewed the results of the diagnostic sleep study  DYLON was not noted  There was no evidence of REM behavior disorder  Sleep efficiency was poor  She reports that this was a typical night for her  There were some PLMs noted that caused arousal, but were minimal   There was only 6% N3 sleep and it was very fragmented  We discussed use of gabapentin   She reports that she has several family members who use it and have anger issues from it  She does not wish to try it  We discussed use of trazodone, which can help with sleep fragmentation and increase N3 sleep  She tried 25mg in the past and felt that it may have worsened tremors in the morning  She only took it 1-2 nights and would like to try it again  She will call with an update if sleep is not improving  She was advised to monitor for orthostatic hypotension while taking with sinemet and to discuss use of trazodone with psychiatry if increase in lexapro is considered, due to potential for serotonin syndrome  We can consider suvorexant, doxepin or temazepam if trazodone is not effective  CBT-I may also be helpful  The following instructions have been given to the patient today:    Patient Instructions   1  Keep sleep/wake schedule consistent, especially wake up time  2  Begin trazodone 1/2 to one tablet daily at bedtime to help with fragmentation and sleep maintenance insomnia  3  Monitor for decrease in blood pressure at night - dizziness  4  Schedule follow up visit in 6 months  5  Call with any questions or concerns prior to follow up, as needed        Nursing Support:  When: Monday through Friday 7A-5PM except holidays  Where: Our direct line is [phone]  If you are having a true emergency please call 911  In the event that the line is busy or it is after hours please leave a voice message and we will return your call  Please speak clearly, leaving your full name, birth date, best number to reach you and the reason for your call  Medication refills: We will need the name of the medication, the dosage, the ordering provider, whether you get a 30 or 90 day refill, and the pharmacy name and address  Medications will be ordered by the provider only  Nurses cannot call in prescriptions  Please allow 7 days for medication refills  Physician requested updates:  If your provider requested that you call with an update after starting medication, please be ready to provide us the medication and dosage, what time you take your medication, the time you attempt to fall asleep, time you fall asleep, when you wake up, and what time you get out of bed  Sleep Study Results: We will contact you with sleep study results and/or next steps after the physician has reviewed your testing            Sarah Booker [facility]

## 2023-05-16 NOTE — PATIENT INSTRUCTIONS
Keep sleep/wake schedule consistent, especially wake up time  Begin trazodone 1/2 to one tablet daily at bedtime to help with fragmentation and sleep maintenance insomnia  Monitor for decrease in blood pressure at night - dizziness  Schedule follow up visit in 6 months  Call with any questions or concerns prior to follow up, as needed        Nursing Support:  When: Monday through Friday 7A-5PM except holidays  Where: Our direct line is [phone]  If you are having a true emergency please call 911  In the event that the line is busy or it is after hours please leave a voice message and we will return your call  Please speak clearly, leaving your full name, birth date, best number to reach you and the reason for your call  Medication refills: We will need the name of the medication, the dosage, the ordering provider, whether you get a 30 or 90 day refill, and the pharmacy name and address  Medications will be ordered by the provider only  Nurses cannot call in prescriptions  Please allow 7 days for medication refills  Physician requested updates: If your provider requested that you call with an update after starting medication, please be ready to provide us the medication and dosage, what time you take your medication, the time you attempt to fall asleep, time you fall asleep, when you wake up, and what time you get out of bed  Sleep Study Results: We will contact you with sleep study results and/or next steps after the physician has reviewed your testing

## 2023-06-02 ENCOUNTER — PATIENT MESSAGE (OUTPATIENT)
Dept: SLEEP CENTER | Facility: CLINIC | Age: 48
End: 2023-06-02

## 2023-06-02 ENCOUNTER — TELEPHONE (OUTPATIENT)
Dept: SLEEP CENTER | Facility: CLINIC | Age: 48
End: 2023-06-02

## 2023-06-02 NOTE — TELEPHONE ENCOUNTER
----- Message from Mee Waters sent at 6/2/2023 10:31 AM EDT -----  Regarding: Trazodone  Contact: [phone]  Good Morning,     Thank you for the telephone call  I think the email/messages might be easier  The trazodone is helping me sleep, but I am now having issues with swelling in my joints (knees), knee pain, and head pressure  I stopped taking the trazodone for a few days because I had a stomach bug, and the swelling and knee pain went away  I resumed taking trazodone and I can feel the swelling and pain in my knees        Thank you,     Sushma Greco

## 2023-06-05 ENCOUNTER — TELEMEDICINE (OUTPATIENT)
Dept: BEHAVIORAL/MENTAL HEALTH CLINIC | Facility: CLINIC | Age: 48
End: 2023-06-05
Payer: OTHER GOVERNMENT

## 2023-06-05 DIAGNOSIS — F33.0 DEPRESSION, MAJOR, RECURRENT, MILD (HCC): Primary | ICD-10-CM

## 2023-06-05 DIAGNOSIS — F41.9 ANXIETY: ICD-10-CM

## 2023-06-05 PROCEDURE — 90832 PSYTX W PT 30 MINUTES: CPT | Performed by: SOCIAL WORKER

## 2023-06-06 NOTE — PSYCH
This note was not shared with the patient due to this is a psychotherapy note      Virtual Regular Visit    Verification of patient location:    Patient is located at Home in the following state in which I hold an active license [place]      Assessment/Plan:    Problem List Items Addressed This Visit    None      Goals addressed in session: Goal 1          Reason for visit is No chief complaint on file  Encounter provider Brooke Santos    Provider located at [place] [id number]  [address] [facility] [address] [phone]      The patient was identified by name and date of birth  Connie Collier was informed that this is a telemedicine visit and that the visit is being conducted [employer]  She agrees to proceed     My office door was closed  No one else was in the room  She acknowledged consent and understanding of privacy and security of the video platform  The patient has agreed to participate and understands they can discontinue the visit at any time  Patient is aware this is a billable service  Subjective  Connie Collier is a 50 y o  female   Behavioral Health Psychotherapy Progress Note    Psychotherapy Provided: Individual Psychotherapy     1  Depression, major, recurrent, mild ([facility] [address] )        2  Anxiety            Goals addressed in session: Goal 1     DATA: Met with Cecy  Her main stressor is her dx and symptoms of Parkinson's  She has begun to take her Lexapro again to help manage her anxiety symptoms  Sleep has been an issue due to restlessness at night, she believes this is associated with the Parkinson's; however, her doctor is telling her it can also be behavioral health  She expressed her thoughts and feelings, she does voice some frustration with the lack of answers after a sleep test   Her feelings were validated  Support therapy was provided     She was able to get into a "movement doctor for her Parkinson's at the end of the month and she is looking forward to this  She agreed to a referral to [facility] for additional support along with the recommendation of CBT-I  yousif  We did review sleep hygiene as well which Javier Schwartz is reporting no issues with  [facility] Support group was recommended for herself and her   She did state, \"it may be time for this '  She was informed that there are several other virtual larger support groups online she can look into- she is aware of the [employer] and has been utilizing this resource  Her family has been supportive and she continues to remain active- working and traveling to visit her children  During this session, this clinician used the following therapeutic modalities: Client-centered Therapy, Cognitive Behavioral Therapy and Supportive Psychotherapy    Substance Abuse was not addressed during this session  If the client is diagnosed with a co-occurring substance use disorder, please indicate any changes in the frequency or amount of use: na  Stage of change for addressing substance use diagnoses: No substance use/Not applicable    ASSESSMENT:  Favian Galvan presents with a euthymic/normal mood  her affect is Normal range and intensity, which is congruent, with her mood and the content of the session  The client has made progress on their goals  Favian Galvan presents with a none risk of suicide, none risk of self-harm, and none risk of harm to others  For any risk assessment that surpasses a \"low\" rating, a safety plan must be developed  A safety plan was indicated: no  If yes, describe in detail na    PLAN: Between sessions, Favian Galvan will continue to use anxiety-reduction tools learned in session- begin using José Luisoud and CBT I phone yousif   At the next session, the therapist will use Client-centered Therapy, Cognitive Behavioral " Therapy and Supportive Psychotherapy to address anxiety, stressors  Behavioral Health Treatment Plan and Discharge Planning: Court Dameon is aware of and agrees to continue to work on their treatment plan  They have identified and are working toward their discharge goals   yes    Visit start and stop times:    06/05/23  Start Time: 1801  Stop Time: 1826  Total Visit Time: 25 minutes

## 2023-06-27 DIAGNOSIS — F33.0 DEPRESSION, MAJOR, RECURRENT, MILD (HCC): ICD-10-CM

## 2023-06-27 RX ORDER — ESCITALOPRAM OXALATE 5 MG/1
TABLET ORAL
Qty: 90 TABLET | Refills: 0 | Status: SHIPPED | OUTPATIENT
Start: 2023-06-27 | End: 2023-07-06

## 2023-07-06 ENCOUNTER — OFFICE VISIT (OUTPATIENT)
Dept: FAMILY MEDICINE CLINIC | Facility: CLINIC | Age: 48
End: 2023-07-06
Payer: OTHER GOVERNMENT

## 2023-07-06 VITALS
SYSTOLIC BLOOD PRESSURE: 110 MMHG | HEART RATE: 85 BPM | OXYGEN SATURATION: 98 % | DIASTOLIC BLOOD PRESSURE: 72 MMHG | TEMPERATURE: 98 F | HEIGHT: 65 IN | BODY MASS INDEX: 27.56 KG/M2 | WEIGHT: 165.4 LBS

## 2023-07-06 DIAGNOSIS — M25.572 CHRONIC PAIN OF BOTH ANKLES: ICD-10-CM

## 2023-07-06 DIAGNOSIS — G89.29 CHRONIC PAIN OF BOTH ANKLES: ICD-10-CM

## 2023-07-06 DIAGNOSIS — M25.562 PAIN AND SWELLING OF LEFT KNEE: Primary | ICD-10-CM

## 2023-07-06 DIAGNOSIS — M25.571 CHRONIC PAIN OF BOTH ANKLES: ICD-10-CM

## 2023-07-06 DIAGNOSIS — Z13.220 SCREENING FOR LIPID DISORDERS: ICD-10-CM

## 2023-07-06 DIAGNOSIS — M25.462 PAIN AND SWELLING OF LEFT KNEE: Primary | ICD-10-CM

## 2023-07-06 PROBLEM — M79.10 GENERALIZED MUSCLE ACHE: Status: ACTIVE | Noted: 2023-06-23

## 2023-07-06 PROBLEM — R20.0 NUMBNESS AND TINGLING IN BOTH HANDS: Status: ACTIVE | Noted: 2023-06-23

## 2023-07-06 PROBLEM — R20.2 NUMBNESS AND TINGLING IN BOTH HANDS: Status: ACTIVE | Noted: 2023-06-23

## 2023-07-06 PROBLEM — L25.1: Status: RESOLVED | Noted: 2019-05-20 | Resolved: 2023-07-06

## 2023-07-06 PROBLEM — M25.469 PAIN AND SWELLING OF KNEE: Status: ACTIVE | Noted: 2023-06-23

## 2023-07-06 PROBLEM — M25.569 PAIN AND SWELLING OF KNEE: Status: ACTIVE | Noted: 2023-06-23

## 2023-07-06 PROCEDURE — 99214 OFFICE O/P EST MOD 30 MIN: CPT | Performed by: PHYSICIAN ASSISTANT

## 2023-07-06 NOTE — PROGRESS NOTES
Assessment/Plan:          Diagnoses and all orders for this visit:    Pain and swelling of left knee  -     HUMBERTO Screen w/ Reflex to Titer/Pattern; Future  -     CBC and differential; Future  -     Comprehensive metabolic panel; Future  -     TSH, 3rd generation with Free T4 reflex; Future  -     RF Screen w/ Reflex to Titer; Future  -     Lyme Disease Serology w/Reflex; Future  -     Ambulatory Referral to Orthopedic Surgery; Future    Chronic pain of both ankles  -     HUMBERTO Screen w/ Reflex to Titer/Pattern; Future  -     CBC and differential; Future  -     Comprehensive metabolic panel; Future  -     TSH, 3rd generation with Free T4 reflex; Future  -     RF Screen w/ Reflex to Titer; Future  -     Lyme Disease Serology w/Reflex; Future    Screening for lipid disorders  -     Lipid panel; Future            Subjective:      Patient ID: Pio Marquez is a 50 y.o. female. Patient is for follow-up of bilateral foot, ankle, and knee pain. She saw a [facility] podiatrist for her foot and ankle pain. She was given a website to go on an order orthotics. She was not told what orthotics she needed. She was then given a list of tennis shoes that would be best for her to wear. She continues with bilateral foot and ankle pain along with knee pain. Her left knee is getting swollen through the day. Knee Pain   The incident occurred more than 1 week ago. There was no injury mechanism. The pain is present in the left knee and right knee. The pain is moderate. The pain has been fluctuating since onset. Pertinent negatives include no inability to bear weight, loss of motion, loss of sensation, muscle weakness, numbness or tingling. She reports no foreign bodies present. Nothing aggravates the symptoms. She has tried acetaminophen and NSAIDs for the symptoms. The treatment provided mild relief.        The following portions of the patient's history were reviewed and updated as appropriate:   She has a past medical history of Anxiety, Depression, Food allergy, Gall stone, Headache, tension-type, History of migraine, Memory loss (noticed november 2019), Migraine, Motion sickness, PONV (postoperative nausea and vomiting), and Wears glasses. ,  does not have any pertinent problems on file. ,   has a past surgical history that includes Tonsillectomy; Knee arthroscopy (Left); Refractive surgery (Bilateral); Endometrial ablation; Colonoscopy; [place] tooth extraction; pr laparoscopy w/rmvl adnexal structures (Right, 9/16/2016); pr laps abd prtm&omentum dx w/wo spec br/wa spx (N/A, 9/16/2016); Injection anesthetic agent to cervical plexus; pr laparoscopy surg cholecystectomy (N/A, 5/8/2019); pr laps abd prtm&omentum dx w/wo spec br/wa spx (N/A, 5/8/2019); Breast cyst aspiration (Left); Hysterectomy (2015); and Oophorectomy (Right, 2016). ,  family history includes Alcohol abuse in her father and maternal grandfather; Cancer in her maternal grandmother; Diabetes in her sister; Heart disease in her maternal grandfather; Hypertension in her maternal grandfather; Migraines in her sister and sister; No Known Problems in her daughter and sister; Ovarian cancer in her maternal aunt; Substance Abuse in her father. She was adopted. ,   reports that she has never smoked. She has never used smokeless tobacco. She reports current alcohol use of about 1.0 standard drink of alcohol per week. She reports that she does not use drugs. ,  is allergic to grass pollen(k-o-r-t-swt kevin), penicillins, codeine, shellfish-derived products - food allergy, tetracyclines & related, trazodone, and ancef [cefazolin]. .  Current Outpatient Medications   Medication Sig Dispense Refill   • carbidopa-levodopa (SINEMET)  mg per tablet Take 1 tablet by mouth every 6 (six) hours     • cholecalciferol (VITAMIN D3) 1,000 units tablet Take 1 tablet (1,000 Units total) by mouth daily 30 tablet 3   • EPINEPHrine (EPIPEN) 0.3 mg/0.3 mL SOAJ inject 0.3 milliliters intramuscularly immediately for ALLERGIC REACTION     • Galcanezumab-gnlm 120 MG/ML SOAJ Inject 120 mg under the skin every 30 (thirty) days 1 mL 11   • magnesium Oxide (MAG-OX) 400 mg TABS Take 400 mg by mouth in the morning     • Multiple Vitamin (MULTIVITAMIN) capsule Take 1 capsule by mouth daily     • rizatriptan (MAXALT-MLT) 10 mg disintegrating tablet take 1 tablet by mouth AT MIGRAINE ONSET CAN REPEAT ONCE IN 2 HOURS. MAX 2 TO 3 DAYS A WEEK 12 tablet 3     No current facility-administered medications for this visit. Review of Systems   Constitutional: Negative for chills and diaphoresis. Musculoskeletal: Positive for arthralgias and joint swelling. Negative for back pain and myalgias. Neurological: Negative for tingling and numbness. Objective:  Vitals:    07/06/23 1045   BP: 110/72   BP Location: Left arm   Patient Position: Standing   Cuff Size: Standard   Pulse: 85   Temp: 98 °F (36.7 °C)   TempSrc: Tympanic   SpO2: 98%   Weight: 75 kg (165 lb 6.4 oz)   Height: 5' 5" (1.651 m)     Body mass index is 27.52 kg/m². Physical Exam  Vitals and nursing note reviewed. Constitutional:       Appearance: Normal appearance. HENT:      Head: Normocephalic. Eyes:      Conjunctiva/sclera: Conjunctivae normal.   Cardiovascular:      Rate and Rhythm: Normal rate. Pulmonary:      Effort: Pulmonary effort is normal.   Musculoskeletal:      Right knee: No swelling, bony tenderness or crepitus. Normal range of motion. No tenderness. Left knee: No swelling, bony tenderness or crepitus. Normal range of motion. Tenderness present over the patellar tendon. Right lower leg: No edema. Left lower leg: No edema. Right ankle: Normal.      Right Achilles Tendon: Normal.      Left ankle: Normal.      Left Achilles Tendon: Normal.   Neurological:      Mental Status: She is alert and oriented to person, place, and time.    Psychiatric:         Mood and Affect: Mood normal.         Behavior: Behavior normal.

## 2023-07-07 ENCOUNTER — APPOINTMENT (OUTPATIENT)
Dept: LAB | Facility: CLINIC | Age: 48
End: 2023-07-07
Payer: OTHER GOVERNMENT

## 2023-07-07 DIAGNOSIS — M25.571 CHRONIC PAIN OF BOTH ANKLES: ICD-10-CM

## 2023-07-07 DIAGNOSIS — M25.462 PAIN AND SWELLING OF LEFT KNEE: ICD-10-CM

## 2023-07-07 DIAGNOSIS — M25.572 CHRONIC PAIN OF BOTH ANKLES: ICD-10-CM

## 2023-07-07 DIAGNOSIS — M25.562 PAIN AND SWELLING OF LEFT KNEE: ICD-10-CM

## 2023-07-07 DIAGNOSIS — Z13.220 SCREENING FOR LIPID DISORDERS: ICD-10-CM

## 2023-07-07 DIAGNOSIS — F33.0 DEPRESSION, MAJOR, RECURRENT, MILD (HCC): ICD-10-CM

## 2023-07-07 DIAGNOSIS — G89.29 CHRONIC PAIN OF BOTH ANKLES: ICD-10-CM

## 2023-07-07 LAB
25(OH)D3 SERPL-MCNC: 39.8 NG/ML (ref 30–100)
ALBUMIN SERPL BCP-MCNC: 4.2 G/DL (ref 3.5–5)
ALP SERPL-CCNC: 86 U/L (ref 46–116)
ALT SERPL W P-5'-P-CCNC: 31 U/L (ref 12–78)
ANA SER QL IA: NEGATIVE
ANION GAP SERPL CALCULATED.3IONS-SCNC: 1 MMOL/L
AST SERPL W P-5'-P-CCNC: 32 U/L (ref 5–45)
B BURGDOR IGG+IGM SER-ACNC: 0.2 AI
BASOPHILS # BLD AUTO: 0.04 THOUSANDS/ÂΜL (ref 0–0.1)
BASOPHILS NFR BLD AUTO: 1 % (ref 0–1)
BILIRUB SERPL-MCNC: 1.94 MG/DL (ref 0.2–1)
BUN SERPL-MCNC: 9 MG/DL (ref 5–25)
CALCIUM SERPL-MCNC: 9.3 MG/DL (ref 8.3–10.1)
CHLORIDE SERPL-SCNC: 107 MMOL/L (ref 96–108)
CHOLEST SERPL-MCNC: 180 MG/DL
CO2 SERPL-SCNC: 27 MMOL/L (ref 21–32)
CREAT SERPL-MCNC: 0.9 MG/DL (ref 0.6–1.3)
EOSINOPHIL # BLD AUTO: 0.08 THOUSAND/ÂΜL (ref 0–0.61)
EOSINOPHIL NFR BLD AUTO: 1 % (ref 0–6)
ERYTHROCYTE [DISTWIDTH] IN BLOOD BY AUTOMATED COUNT: 13 % (ref 11.6–15.1)
GFR SERPL CREATININE-BSD FRML MDRD: 75 ML/MIN/1.73SQ M
GLUCOSE P FAST SERPL-MCNC: 89 MG/DL (ref 65–99)
HCT VFR BLD AUTO: 47.4 % (ref 34.8–46.1)
HDLC SERPL-MCNC: 90 MG/DL
HGB BLD-MCNC: 16 G/DL (ref 11.5–15.4)
IMM GRANULOCYTES # BLD AUTO: 0.01 THOUSAND/UL (ref 0–0.2)
IMM GRANULOCYTES NFR BLD AUTO: 0 % (ref 0–2)
LDLC SERPL CALC-MCNC: 80 MG/DL (ref 0–100)
LYMPHOCYTES # BLD AUTO: 1.49 THOUSANDS/ÂΜL (ref 0.6–4.47)
LYMPHOCYTES NFR BLD AUTO: 20 % (ref 14–44)
MCH RBC QN AUTO: 29.4 PG (ref 26.8–34.3)
MCHC RBC AUTO-ENTMCNC: 33.8 G/DL (ref 31.4–37.4)
MCV RBC AUTO: 87 FL (ref 82–98)
MONOCYTES # BLD AUTO: 0.55 THOUSAND/ÂΜL (ref 0.17–1.22)
MONOCYTES NFR BLD AUTO: 8 % (ref 4–12)
NEUTROPHILS # BLD AUTO: 5.2 THOUSANDS/ÂΜL (ref 1.85–7.62)
NEUTS SEG NFR BLD AUTO: 70 % (ref 43–75)
NONHDLC SERPL-MCNC: 90 MG/DL
NRBC BLD AUTO-RTO: 0 /100 WBCS
PLATELET # BLD AUTO: 251 THOUSANDS/UL (ref 149–390)
PMV BLD AUTO: 10.2 FL (ref 8.9–12.7)
POTASSIUM SERPL-SCNC: 4.4 MMOL/L (ref 3.5–5.3)
PROT SERPL-MCNC: 8 G/DL (ref 6.4–8.4)
RBC # BLD AUTO: 5.44 MILLION/UL (ref 3.81–5.12)
RHEUMATOID FACT SER QL LA: NEGATIVE
SODIUM SERPL-SCNC: 135 MMOL/L (ref 135–147)
TRIGL SERPL-MCNC: 51 MG/DL
TSH SERPL DL<=0.05 MIU/L-ACNC: 2.01 UIU/ML (ref 0.45–4.5)
WBC # BLD AUTO: 7.37 THOUSAND/UL (ref 4.31–10.16)

## 2023-07-07 PROCEDURE — 80061 LIPID PANEL: CPT

## 2023-07-07 PROCEDURE — 86618 LYME DISEASE ANTIBODY: CPT

## 2023-07-07 PROCEDURE — 85025 COMPLETE CBC W/AUTO DIFF WBC: CPT

## 2023-07-07 PROCEDURE — 86430 RHEUMATOID FACTOR TEST QUAL: CPT

## 2023-07-07 PROCEDURE — 86038 ANTINUCLEAR ANTIBODIES: CPT

## 2023-07-07 PROCEDURE — 84443 ASSAY THYROID STIM HORMONE: CPT

## 2023-07-07 PROCEDURE — 36415 COLL VENOUS BLD VENIPUNCTURE: CPT

## 2023-07-07 PROCEDURE — 82306 VITAMIN D 25 HYDROXY: CPT

## 2023-07-07 PROCEDURE — 80053 COMPREHEN METABOLIC PANEL: CPT

## 2023-07-17 ENCOUNTER — APPOINTMENT (OUTPATIENT)
Dept: RADIOLOGY | Facility: AMBULARY SURGERY CENTER | Age: 48
End: 2023-07-17
Attending: STUDENT IN AN ORGANIZED HEALTH CARE EDUCATION/TRAINING PROGRAM
Payer: OTHER GOVERNMENT

## 2023-07-17 ENCOUNTER — OFFICE VISIT (OUTPATIENT)
Dept: OBGYN CLINIC | Facility: CLINIC | Age: 48
End: 2023-07-17
Payer: OTHER GOVERNMENT

## 2023-07-17 VITALS
DIASTOLIC BLOOD PRESSURE: 68 MMHG | SYSTOLIC BLOOD PRESSURE: 102 MMHG | WEIGHT: 165.4 LBS | HEART RATE: 64 BPM | BODY MASS INDEX: 27.56 KG/M2 | HEIGHT: 65 IN

## 2023-07-17 DIAGNOSIS — M25.561 RIGHT KNEE PAIN, UNSPECIFIED CHRONICITY: ICD-10-CM

## 2023-07-17 DIAGNOSIS — M25.462 PAIN AND SWELLING OF LEFT KNEE: ICD-10-CM

## 2023-07-17 DIAGNOSIS — M17.0 PRIMARY OSTEOARTHRITIS OF BOTH KNEES: ICD-10-CM

## 2023-07-17 DIAGNOSIS — M25.562 LEFT KNEE PAIN, UNSPECIFIED CHRONICITY: ICD-10-CM

## 2023-07-17 DIAGNOSIS — M25.562 LEFT KNEE PAIN, UNSPECIFIED CHRONICITY: Primary | ICD-10-CM

## 2023-07-17 DIAGNOSIS — M25.562 PAIN AND SWELLING OF LEFT KNEE: ICD-10-CM

## 2023-07-17 PROCEDURE — 73562 X-RAY EXAM OF KNEE 3: CPT

## 2023-07-17 PROCEDURE — 99244 OFF/OP CNSLTJ NEW/EST MOD 40: CPT | Performed by: STUDENT IN AN ORGANIZED HEALTH CARE EDUCATION/TRAINING PROGRAM

## 2023-07-17 RX ORDER — MELOXICAM 15 MG/1
15 TABLET ORAL DAILY
Qty: 30 TABLET | Refills: 3 | Status: SHIPPED | OUTPATIENT
Start: 2023-07-17

## 2023-07-17 NOTE — PROGRESS NOTES
Orthopaedics Office Visit - New Patient Visit    ASSESSMENT/PLAN:    Assessment:   1. Bilateral mild knee primary osteoarthritis  2. Polyarthralgia, NOS    Plan:   - Discussion had with the patient regarding possible rheumatoid vs. Osteoarthritis as etiology for her knee pain. Patient was advised that steroid injections can provide symptomatic relief, but will not treat underlying etiologies. Her work up to date has not demonstrated serological evidence of autoimmune disease and a negative lyme titer, but she was advised that autoimmune arthralgias can be seronegative and she should follow up with her PCP/rheumatology for further evaluation.  - Patient offered steroid injections which she declined due to prior experience of flushing related to steroid injection  -A prescription was written today for meloxicam 15 mg p.o. once daily as needed for pain relief. Patient also instructed that if she is having pain at different times of the day she can take half of the medication in the morning and half of the medication at night. Risk medications discussed. - Follow up PCP for further poly rheumatic processes.  -Discussed with the patient option of viscosupplementation for bilateral knees since she cannot receive corticosteroid injections given her previous reaction. Patient will call clinic if she would like to proceed with that route. - Return to clinic PRN for her bilateral knee arthritis      _____________________________________________________  CHIEF COMPLAINT:  Chief Complaint   Patient presents with   • Left Knee - Pain   • Right Knee - Pain         SUBJECTIVE:  Merline Griffins is a 50 y.o. female who presents for 1 month of polyarthralgias in her bilateral hands, knees, ankles and feet. She was referred to our clinic for evaluation of her bilateral knee pain. The patient reports that the knee pain began atraumatically 1 month ago and is not associated with activities.   She has no radiation of the pain and no numbness, tingling, fevers or chills. She has a history of early onset parkinson's, but no autoimmune diseases. Right knee bothers her more than left. She had a prior left knee arthroscopy in [facility] and patient believes was a meniscectomy. .  The patient denies any new traumas to the right or left knee. Patient has stiffness after prolonged periods of inactivity. She has stiffness in the beginning of the morning when she arises from bed which is difficult to start getting moving. She also describes aching at the end of high activity days which is throbbing in nature. No catching or locking.     PAST MEDICAL HISTORY:  Past Medical History:   Diagnosis Date   • Anxiety    • Depression    • Food allergy     scallops   • Gall stone     lap jules today 5/8/2019   • Headache, tension-type    • History of migraine    • Memory loss noticed november 2019    Im having issues remembering names, words, etc.   • Migraine    • Motion sickness    • PONV (postoperative nausea and vomiting)    • Wears glasses        PAST SURGICAL HISTORY:  Past Surgical History:   Procedure Laterality Date   • BREAST CYST ASPIRATION Left     benign-age 11-14   • COLONOSCOPY     • ENDOMETRIAL ABLATION     • HYSTERECTOMY  2015    Partial   • INJECTION ANESTHETIC AGENT TO CERVICAL PLEXUS     • KNEE ARTHROSCOPY Left    • OOPHORECTOMY Right 2016   • UT LAPAROSCOPY SURG CHOLECYSTECTOMY N/A 5/8/2019    Procedure: LAP JULES w/ IOC;  Surgeon: Amador Jacques MD;  Location: [facility];  Service: General   • UT LAPAROSCOPY W/RMVL ADNEXAL STRUCTURES Right 9/16/2016    Procedure:  Natalie Serrano;  Surgeon: Valeria Garcia DO;  Location: [facility];  Service: Gynecology   • UT LAPS ABD PRTM&OMENTUM DX W/WO SPEC BR/WA SPX N/A 9/16/2016    Procedure: LAPAROSCOPY DIAGNOSTIC;  Surgeon: Valeria Garcia DO;  Location: [facility];  Service: Gynecology   • UT LAPS ABD PRTM&OMENTUM DX W/WO [facility] BR/WA [facility] N/A 5/8/2019    Procedure: LAPAROSCOPY DIAGNOSTIC; Surgeon: Atanacio Bamberger, MD;  Location[facility];  Service: General   • REFRACTIVE SURGERY Bilateral    • TONSILLECTOMY     • WISDOM TOOTH EXTRACTION         FAMILY HISTORY:  Family History   Adopted: Yes   Problem Relation Age of Onset   • Alcohol abuse Father    • Substance Abuse Father    • Diabetes Sister    • Migraines Sister    • No Known Problems Sister    • No Known Problems Daughter    • Cancer Maternal Grandmother    • Alcohol abuse Maternal Grandfather    • Heart disease Maternal Grandfather    • Hypertension Maternal Grandfather    • Ovarian cancer Maternal Aunt    • Migraines Sister    • Breast cancer Neg Hx        SOCIAL HISTORY:  Social History     Tobacco Use   • Smoking status: Never   • Smokeless tobacco: Never   Vaping Use   • Vaping Use: Never used   Substance Use Topics   • Alcohol use: Yes     Alcohol/week: 1.0 standard drink of alcohol     Types: 1 Glasses of wine per week     Comment: 2 weekly   • Drug use: No       MEDICATIONS:    Current Outpatient Medications:   •  carbidopa-levodopa (SINEMET)  mg per tablet, Take 1 tablet by mouth every 6 (six) hours, Disp: , Rfl:   •  cholecalciferol (VITAMIN D3) 1,000 units tablet, Take 1 tablet (1,000 Units total) by mouth daily, Disp: 30 tablet, Rfl: 3  •  EPINEPHrine (EPIPEN) 0.3 mg/0.3 mL SOAJ, inject 0.3 milliliters intramuscularly immediately for ALLERGIC REACTION, Disp: , Rfl:   •  Galcanezumab-gnlm 120 MG/ML SOAJ, Inject 120 mg under the skin every 30 (thirty) days, Disp: 1 mL, Rfl: 11  •  magnesium Oxide (MAG-OX) 400 mg TABS, Take 400 mg by mouth in the morning, Disp: , Rfl:   •  Multiple Vitamin (MULTIVITAMIN) capsule, Take 1 capsule by mouth daily, Disp: , Rfl:   •  rizatriptan (MAXALT-MLT) 10 mg disintegrating tablet, take 1 tablet by mouth AT MIGRAINE ONSET CAN REPEAT ONCE IN 2 HOURS.  MAX 2 TO 3 DAYS A WEEK, Disp: 12 tablet, Rfl: 3    ALLERGIES:  Allergies   Allergen Reactions   • Grass Pollen(K-O-R-T-Swt Channing) Shortness Of Breath Fresh cut, breathing    • Penicillins Hives   • Codeine GI Intolerance   • Shellfish-Derived Products - Food Allergy Other (See Comments)     Intolerance to scallops -reaction severe vomitting   • Tetracyclines & Related GI Intolerance   • Trazodone Other (See Comments)     Joint pain/myalgia   • Ancef [Cefazolin] Tachycardia       REVIEW OF SYSTEMS:  MSK: Bilateral knee pain  Neuro: no numbness or tingling  Pertinent items are otherwise noted in HPI. A comprehensive review of systems was otherwise negative. LABS:  HgA1c:   Lab Results   Component Value Date    HGBA1C 5.1 08/02/2022     BMP:   Lab Results   Component Value Date    CALCIUM 9.3 07/07/2023    K 4.4 07/07/2023    CO2 27 07/07/2023     07/07/2023    BUN 9 07/07/2023    CREATININE 0.90 07/07/2023     CBC: No components found for: "CBC"    _____________________________________________________  PHYSICAL EXAMINATION:  Vital signs: Ht 5' 5" (1.651 m)   Wt 75 kg (165 lb 6.4 oz)   LMP  (LMP Unknown)   BMI 27.52 kg/m²   General: No acute distress, awake and alert  Psychiatric: Mood and affect appear appropriate  HEENT: Trachea Midline, No torticollis, no apparent facial trauma  Cardiovascular: No audible murmurs; Extremities appear perfused  Pulmonary: No audible wheezing or stridor  Skin: No open lesions; see further details (if any) below    MUSCULOSKELETAL EXAMINATION:  Extremities: The right knee was exposed and inspected. Patient has no lacerations or abrasions. No knee effusion present. Patient's range of motion is from 0 to 120 degrees of flexion. Patient has medial joint line tenderness. No lateral joint line tenderness. No catching or locking throughout range of motion. The patient's knee is stable to varus and valgus stress. Has slight hyperlaxity compared to the normal population. Equivalent to the contralateral side. The patient's negative anterior and posterior drawer testing. Negative Vibha.   Patient's sensation is intact to light touch in the superficial peroneal, deep peroneal, sural, saphenous, plantar nerve distributions. Tibialis anterior, extensor hallux longus, gastroc, extensor mechanism intact. limb is well-perfused    Left knee was exposed inspected. Patient has no lacerations or abrasions. Patient's previous surgical scars are well-healed without any surrounding erythema or induration. The patient has range of motion of the knee from 0 to 120 degrees of flexion. Slight crepitus throughout range of motion. Patient has medial and lateral joint line tenderness. No catching or locking throughout range of motion. Knee is stable to varus and valgus stress at 0 and 30 degrees of flexion. Patient's is negative anterior and posterior drawer test.  Negative Vibha. Patient's sensation is intact to light touch in the superficial peroneal, deep peroneal, sural, saphenous, plantar nerve distributions. Tibialis anterior, extensor hallux longus, gastrocnemius, extensor mechanism intact. Limb is well-perfused  _____________________________________________________  STUDIES REVIEWED:  I personally reviewed the images and interpretation is as follows:  X-rays bilateral knees demonstrate largely preserved joint space of the medial and lateral compartments with minimal joint space narrowing and no osteophyte formation.     PROCEDURES PERFORMED:  Procedures    Niesha Jacinto MD

## 2023-07-27 DIAGNOSIS — M25.50 POLYARTHRALGIA: Primary | ICD-10-CM

## 2023-08-15 ENCOUNTER — ANNUAL EXAM (OUTPATIENT)
Dept: GYNECOLOGY | Facility: CLINIC | Age: 48
End: 2023-08-15
Payer: OTHER GOVERNMENT

## 2023-08-15 ENCOUNTER — OFFICE VISIT (OUTPATIENT)
Dept: FAMILY MEDICINE CLINIC | Facility: CLINIC | Age: 48
End: 2023-08-15
Payer: OTHER GOVERNMENT

## 2023-08-15 VITALS
DIASTOLIC BLOOD PRESSURE: 72 MMHG | SYSTOLIC BLOOD PRESSURE: 116 MMHG | HEIGHT: 65 IN | BODY MASS INDEX: 27.49 KG/M2 | WEIGHT: 165 LBS

## 2023-08-15 VITALS
HEIGHT: 65 IN | BODY MASS INDEX: 27.66 KG/M2 | TEMPERATURE: 97.7 F | HEART RATE: 69 BPM | OXYGEN SATURATION: 99 % | DIASTOLIC BLOOD PRESSURE: 72 MMHG | WEIGHT: 166 LBS | SYSTOLIC BLOOD PRESSURE: 116 MMHG

## 2023-08-15 DIAGNOSIS — Z98.890 HISTORY OF COLONOSCOPY: ICD-10-CM

## 2023-08-15 DIAGNOSIS — Z90.711 STATUS POST LAPAROSCOPIC SUPRACERVICAL HYSTERECTOMY: ICD-10-CM

## 2023-08-15 DIAGNOSIS — Z01.419 WOMEN'S ANNUAL ROUTINE GYNECOLOGICAL EXAMINATION: Primary | ICD-10-CM

## 2023-08-15 DIAGNOSIS — Z00.00 ANNUAL PHYSICAL EXAM: Primary | ICD-10-CM

## 2023-08-15 DIAGNOSIS — Z78.0 ASYMPTOMATIC MENOPAUSAL STATE: ICD-10-CM

## 2023-08-15 DIAGNOSIS — Z86.69 HISTORY OF PARKINSON'S DISEASE: ICD-10-CM

## 2023-08-15 DIAGNOSIS — Z12.31 VISIT FOR SCREENING MAMMOGRAM: ICD-10-CM

## 2023-08-15 PROCEDURE — 99396 PREV VISIT EST AGE 40-64: CPT | Performed by: OBSTETRICS & GYNECOLOGY

## 2023-08-15 PROCEDURE — 99396 PREV VISIT EST AGE 40-64: CPT | Performed by: PHYSICIAN ASSISTANT

## 2023-08-15 PROCEDURE — G0476 HPV COMBO ASSAY CA SCREEN: HCPCS | Performed by: OBSTETRICS & GYNECOLOGY

## 2023-08-15 PROCEDURE — G0145 SCR C/V CYTO,THINLAYER,RESCR: HCPCS | Performed by: OBSTETRICS & GYNECOLOGY

## 2023-08-15 NOTE — PROGRESS NOTES
[address]  [address] [facility]    NAME: Kat Jacob  AGE: 50 y.o. SEX: female  : 1975     DATE: 8/15/2023     Assessment and Plan:     Problem List Items Addressed This Visit        Other    Annual physical exam - Primary         Immunizations and preventive care screenings were discussed with patient today. Appropriate education was printed on patient's after visit summary. Counseling:  Dental Health: discussed importance of regular tooth brushing, flossing, and dental visits. Injury prevention: discussed safety/seat belts, safety helmets, smoke detectors, carbon dioxide detectors, and smoking near bedding or upholstery. Exercise: the importance of regular exercise/physical activity was discussed. Recommend exercise 3-5 times per week for at least 30 minutes. BMI Counseling: Body mass index is 27.62 kg/m². The BMI is above normal. Nutrition recommendations include encouraging healthy choices of fruits and vegetables, consuming healthier snacks, limiting drinks that contain sugar, moderation in carbohydrate intake, increasing intake of lean protein, reducing intake of saturated and trans fat and reducing intake of cholesterol. Exercise recommendations include moderate physical activity 150 minutes/week. Rationale for BMI follow-up plan is due to patient being overweight or obese. Return in about 1 year (around 8/15/2024) for Annual physical.     Chief Complaint:     Chief Complaint   Patient presents with   • Physical Exam      History of Present Illness:     Adult Annual Physical   Patient here for a comprehensive physical exam. The patient reports no problems. Diet and Physical Activity  Diet/Nutrition: well balanced diet. Exercise: walking, moderate cardiovascular exercise and strength training exercises.       Depression Screening  PHQ-2/9 Depression Screening    Little interest or pleasure in doing things: 0 - not at all  Feeling down, depressed, or hopeless: 1 - several days  Trouble falling or staying asleep, or sleeping too much: 1 - several days  Feeling tired or having little energy: 1 - several days  Poor appetite or overeatin - several days  Feeling bad about yourself - or that you are a failure or have let yourself or your family down: 0 - not at all  Trouble concentrating on things, such as reading the newspaper or watching television: 0 - not at all  Moving or speaking so slowly that other people could have noticed. Or the opposite - being so fidgety or restless that you have been moving around a lot more than usual: 0 - not at all  Thoughts that you would be better off dead, or of hurting yourself in some way: 0 - not at all  PHQ-9 Score: 4   PHQ-9 Interpretation: No or Minimal depression        General Health  Sleep: sleeps poorly. Hearing: normal - bilateral.  Vision: goes for regular eye exams and wears glasses. Dental: regular dental visits. /GYN Health  Patient is: hysterectomy  Last menstrual period: hysterectomy  Contraceptive method: hysterectomy. Review of Systems:     Review of Systems   Constitutional: Negative for appetite change, fatigue, fever and unexpected weight change. HENT: Negative for dental problem, ear pain, hearing loss, nosebleeds, rhinorrhea, tinnitus, trouble swallowing and voice change. Eyes: Negative for photophobia, pain, discharge and visual disturbance. Respiratory: Negative for cough, chest tightness, shortness of breath and wheezing. Cardiovascular: Negative for chest pain and palpitations. Gastrointestinal: Negative for abdominal pain, blood in stool, constipation, diarrhea, nausea, rectal pain and vomiting. Endocrine: Negative for cold intolerance, polydipsia, polyphagia and polyuria.    Genitourinary: Negative for decreased urine volume, difficulty urinating, dysuria, enuresis, frequency, genital sores, hematuria and urgency. Musculoskeletal: Negative for arthralgias, back pain, gait problem, joint swelling, myalgias, neck pain and neck stiffness. Skin: Negative for color change and rash. Allergic/Immunologic: Negative for environmental allergies, food allergies and immunocompromised state. Neurological: Negative for dizziness, seizures, speech difficulty, light-headedness and headaches. Hematological: Negative for adenopathy. Does not bruise/bleed easily. Psychiatric/Behavioral: Positive for sleep disturbance. Negative for behavioral problems, confusion, decreased concentration and self-injury. The patient is not nervous/anxious and is not hyperactive.        Past Medical History:     Past Medical History:   Diagnosis Date   • Anxiety    • Depression    • Food allergy     scallops   • Gall stone     lap jules today 5/8/2019   • Headache, tension-type    • History of migraine    • Memory loss noticed november 2019    Im having issues remembering names, words, etc.   • Migraine    • Motion sickness    • PONV (postoperative nausea and vomiting)    • Wears glasses       Past Surgical History:     Past Surgical History:   Procedure Laterality Date   • BREAST CYST ASPIRATION Left     benign-age 11-14   • COLONOSCOPY     • ENDOMETRIAL ABLATION     • HYSTERECTOMY  2015    Partial   • INJECTION ANESTHETIC AGENT TO CERVICAL PLEXUS     • KNEE ARTHROSCOPY Left    • OOPHORECTOMY Right 2016   • VT LAPAROSCOPY SURG CHOLECYSTECTOMY N/A 5/8/2019    Procedure: LAP JULES w/ IOC;  Surgeon: Gerhardt Shores, MD;  Location: [facility];  Service: General   • VT LAPAROSCOPY W/RMVL ADNEXAL STRUCTURES Right 9/16/2016    Procedure:  Arabella Marin;  Surgeon: Zhanna Brown DO;  [facility];  Service: Gynecology   • VT LAPS ABD PRTM&OMENTUM DX W/WO SPEC BR/WA SPX N/A 9/16/2016    Procedure: LAPAROSCOPY DIAGNOSTIC;  Surgeon: Zhanna Brown DO;  [facility];  Service: Gynecology   • VT LAPS ABD PRTM&OMENTUM DX W/WO [facility] BR/WA SPX N/A 5/8/2019    Procedure: LAPAROSCOPY DIAGNOSTIC;  Surgeon: Cailin Fletcher MD;  Location[facility];  Service: General   • REFRACTIVE SURGERY Bilateral    • TONSILLECTOMY     • WISDOM TOOTH EXTRACTION        Social History:     Social History     Socioeconomic History   • Marital status: /Civil Union     Spouse name: None   • Number of children: None   • Years of education: None   • Highest education level: None   Occupational History   • None   Tobacco Use   • Smoking status: Never   • Smokeless tobacco: Never   Vaping Use   • Vaping Use: Never used   Substance and Sexual Activity   • Alcohol use:  Yes     Alcohol/week: 1.0 standard drink of alcohol     Types: 1 Glasses of wine per week     Comment: 2 weekly   • Drug use: No   • Sexual activity: Yes     Partners: Male     Birth control/protection: Surgical, Male Sterilization     Comment: My  had a vasectomy and I had a partial hysterectomy   Other Topics Concern   • None   Social History Narrative    2 children    Caffeine use     Social Determinants of Health     Financial Resource Strain: Not on file   Food Insecurity: Not on file   Transportation Needs: Not on file   Physical Activity: Not on file   Stress: Not on file   Social Connections: Not on file   Intimate Partner Violence: Not on file   Housing Stability: Not on file      Family History:     Family History   Adopted: Yes   Problem Relation Age of Onset   • Alcohol abuse Father    • Substance Abuse Father    • Diabetes Sister    • Migraines Sister    • No Known Problems Sister    • No Known Problems Daughter    • Cancer Maternal Grandmother    • Alcohol abuse Maternal Grandfather    • Heart disease Maternal Grandfather    • Hypertension Maternal Grandfather    • Ovarian cancer Maternal Aunt    • Migraines Sister    • Breast cancer Neg Hx       Current Medications:     Current Outpatient Medications   Medication Sig Dispense Refill   • carbidopa-levodopa (SINEMET)  mg per tablet Take 1 tablet by mouth every 6 (six) hours     • cholecalciferol (VITAMIN D3) 1,000 units tablet Take 1 tablet (1,000 Units total) by mouth daily 30 tablet 3   • EPINEPHrine (EPIPEN) 0.3 mg/0.3 mL SOAJ inject 0.3 milliliters intramuscularly immediately for ALLERGIC REACTION     • magnesium Oxide (MAG-OX) 400 mg TABS Take 400 mg by mouth in the morning     • meloxicam (Mobic) 15 mg tablet Take 1 tablet (15 mg total) by mouth daily 30 tablet 3   • Multiple Vitamin (MULTIVITAMIN) capsule Take 1 capsule by mouth daily     • Galcanezumab-gnlm 120 MG/ML SOAJ Inject 120 mg under the skin every 30 (thirty) days (Patient not taking: Reported on 7/17/2023) 1 mL 11   • rizatriptan (MAXALT-MLT) 10 mg disintegrating tablet take 1 tablet by mouth AT MIGRAINE ONSET CAN REPEAT ONCE IN 2 HOURS. MAX 2 TO 3 DAYS A WEEK (Patient not taking: Reported on 7/17/2023) 12 tablet 3     No current facility-administered medications for this visit. Allergies: Allergies   Allergen Reactions   • Grass Pollen(K-O-R-T-Swt Channing) Shortness Of Breath     Fresh cut, breathing    • Penicillins Hives   • Codeine GI Intolerance   • Shellfish-Derived Products - Food Allergy Other (See Comments)     Intolerance to scallops -reaction severe vomitting   • Tetracyclines & Related GI Intolerance   • Trazodone Other (See Comments)     Joint pain/myalgia   • Ancef [Cefazolin] Tachycardia      Physical Exam:     /72 (BP Location: Left arm, Patient Position: Sitting, Cuff Size: Standard)   Pulse 69   Temp 97.7 °F (36.5 °C) (Tympanic)   Ht 5' 5" (1.651 m)   Wt 75.3 kg (166 lb)   LMP  (LMP Unknown)   SpO2 99%   BMI 27.62 kg/m²     Physical Exam  Vitals and nursing note reviewed. Constitutional:       Appearance: Normal appearance. She is normal weight. HENT:      Head: Normocephalic and atraumatic.       Right Ear: Tympanic membrane, ear canal and external ear normal.      Left Ear: Tympanic membrane, ear canal and external ear normal. Nose: Nose normal.      Mouth/Throat:      Mouth: Mucous membranes are moist.      Pharynx: Oropharynx is clear. Eyes:      Extraocular Movements: Extraocular movements intact. Conjunctiva/sclera: Conjunctivae normal.   Neck:      Thyroid: No thyromegaly. Vascular: No carotid bruit. Cardiovascular:      Rate and Rhythm: Normal rate and regular rhythm. Pulses: Normal pulses. Heart sounds: Normal heart sounds. Pulmonary:      Effort: Pulmonary effort is normal.      Breath sounds: Normal breath sounds. Abdominal:      General: Abdomen is flat. Bowel sounds are normal.      Palpations: Abdomen is soft. There is no hepatomegaly, splenomegaly or mass. Tenderness: There is no abdominal tenderness. There is no right CVA tenderness or left CVA tenderness. Musculoskeletal:         General: Normal range of motion. Cervical back: Normal range of motion and neck supple. Right lower leg: No edema. Left lower leg: No edema. Lymphadenopathy:      Cervical: No cervical adenopathy. Skin:     General: Skin is warm and dry. Neurological:      General: No focal deficit present. Mental Status: She is alert and oriented to person, place, and time. Motor: No weakness. Coordination: Coordination normal.      Gait: Gait normal.   Psychiatric:         Mood and Affect: Mood normal.         Behavior: Behavior normal.         Thought Content:  Thought content normal.         Judgment: Judgment normal.      Labs personally discussed with patient    Results for orders placed or performed in visit on 07/07/23   Vitamin D 25 hydroxy   Result Value Ref Range    Vit D, 25-Hydroxy 39.8 30.0 - 100.0 ng/mL   HUMBERTO Screen w/ Reflex to Titer/Pattern   Result Value Ref Range    HUMBERTO Negative Negative   CBC and differential   Result Value Ref Range    WBC 7.37 4.31 - 10.16 Thousand/uL    RBC 5.44 (H) 3.81 - 5.12 Million/uL    Hemoglobin 16.0 (H) 11.5 - 15.4 g/dL    Hematocrit 47.4 (H) 34.8 - 46.1 %    MCV 87 82 - 98 fL    MCH 29.4 26.8 - 34.3 pg    MCHC 33.8 31.4 - 37.4 g/dL    RDW 13.0 11.6 - 15.1 %    MPV 10.2 8.9 - 12.7 fL    Platelets 915 096 - 125 Thousands/uL    nRBC 0 /100 WBCs    Neutrophils Relative 70 43 - 75 %    Immat GRANS % 0 0 - 2 %    Lymphocytes Relative 20 14 - 44 %    Monocytes Relative 8 4 - 12 %    Eosinophils Relative 1 0 - 6 %    Basophils Relative 1 0 - 1 %    Neutrophils Absolute 5.20 1.85 - 7.62 Thousands/µL    Immature Grans Absolute 0.01 0.00 - 0.20 Thousand/uL    Lymphocytes Absolute 1.49 0.60 - 4.47 Thousands/µL    Monocytes Absolute 0.55 0.17 - 1.22 Thousand/µL    Eosinophils Absolute 0.08 0.00 - 0.61 Thousand/µL    Basophils Absolute 0.04 0.00 - 0.10 Thousands/µL   Comprehensive metabolic panel   Result Value Ref Range    Sodium 135 135 - 147 mmol/L    Potassium 4.4 3.5 - 5.3 mmol/L    Chloride 107 96 - 108 mmol/L    CO2 27 21 - 32 mmol/L    ANION GAP 1 mmol/L    BUN 9 5 - 25 mg/dL    Creatinine 0.90 0.60 - 1.30 mg/dL    Glucose, Fasting 89 65 - 99 mg/dL    Calcium 9.3 8.3 - 10.1 mg/dL    AST 32 5 - 45 U/L    ALT 31 12 - 78 U/L    Alkaline Phosphatase 86 46 - 116 U/L    Total Protein 8.0 6.4 - 8.4 g/dL    Albumin 4.2 3.5 - 5.0 g/dL    Total Bilirubin 1.94 (H) 0.20 - 1.00 mg/dL    eGFR 75 ml/min/1.73sq m   TSH, 3rd generation with Free T4 reflex   Result Value Ref Range    TSH 3RD GENERATON 2.011 0.450 - 4.500 uIU/mL   RF Screen w/ Reflex to Titer   Result Value Ref Range    Rheumatoid Factor Negative Negative   Lipid panel   Result Value Ref Range    Cholesterol 180 See Comment mg/dL    Triglycerides 51 See Comment mg/dL    HDL, Direct 90 >=50 mg/dL    LDL Calculated 80 0 - 100 mg/dL    Non-HDL-Chol (CHOL-HDL) 90 mg/dl   Lyme Total Antibody Profile with reflex to WB   Result Value Ref Range    Lyme Total Antibodies 0.2 0.2 - 8.0 AI     Clary Reddy, PA-C  [address] [place] [address]

## 2023-08-15 NOTE — PROGRESS NOTES
Assessment   Annual well woman exam  Status post laparoscopic supracervical hysterectomy , AUB and pelvic pain  Asymptomatic menopausal state, discontinued norethindrone 0.35 mg daily  History of Parkinson's disease  History of colonoscopy        Plan   Screen mammography ordered  Reviewed self breast exam techniques  No new GYN complaints or concerns   All questions answered. Await pap smear results. Subjective here for annual exam     Debbie Her is a 50 y.o. female who presents for annual exam.  Status post laparoscopic supracervical hysterectomy in  for abnormal uterine bleeding and pelvic pain. She is still occasionally sexually active. Denies any pelvic pain symptoms denies vaginal bleeding or unusual discharge. The patient reports that there is not domestic violence in her life. Current contraception: status post hysterectomy  History of abnormal Pap smear: no  Family history of uterine or ovarian cancer: no  Regular self breast exam: yes  History of abnormal mammogram: no  Family history of breast cancer: no  History of abnormal lipids: no  Menstrual History:  OB History        2    Para   2    Term   2            AB        Living           SAB        IAB        Ectopic        Multiple        Live Births                    Menarche age: 13  No LMP recorded (lmp unknown). Patient has had a hysterectomy. Review of Systems  Pertinent items are noted in HPI.       Objective no acute distress   /72   Ht 5' 5" (1.651 m)   Wt 74.8 kg (165 lb)   LMP  (LMP Unknown)   BMI 27.46 kg/m²     General:   alert and oriented, in no acute distress, alert, appears stated age and cooperative   Heart: regular rate and rhythm, S1, S2 normal, no murmur, click, rub or gallop   Lungs: clear to auscultation bilaterally   Abdomen: soft, non-tender, without masses or organomegaly   Vulva: normal, Bartholin's, Urethra, Griffith's normal, female escutcheon   Vagina: normal mucosa, normal discharge, no palpable nodules   Cervix: anteverted, multiparous appearance, no bleeding following Pap, no cervical motion tenderness and no lesions   Uterus: surgically absent   Adnexa: normal adnexa no pelvic masses noted   Bilateral breast exam in the sitting and supine position with chaperone present, no visible or palpable breast lesions identified. No breast masses noted. No supraclavicular or axillary lymphadenopathy noted. No nipple discharge. Reviewed self-breast exam techniques.    Rectal exam,  deferred

## 2023-08-22 LAB
LAB AP GYN PRIMARY INTERPRETATION: NORMAL
Lab: NORMAL

## 2023-09-18 ENCOUNTER — DOCUMENTATION (OUTPATIENT)
Dept: BEHAVIORAL/MENTAL HEALTH CLINIC | Facility: CLINIC | Age: 48
End: 2023-09-18

## 2023-09-18 DIAGNOSIS — F33.0 DEPRESSION, MAJOR, RECURRENT, MILD (HCC): Primary | ICD-10-CM

## 2023-09-18 NOTE — PROGRESS NOTES
This note was not shared with the patient due to this is a psychotherapy note      Psychotherapy Discharge Summary    Preferred Name: Patricia Allen  YOB: 1975    Admission date to psychotherapy: 2/25/22    Referred by: self    Presenting Problem:   Per this writer 2/25/22:  Patricia Allen is a 49 y/o , female referred to psychotherapy by self due to worsening depression and anxiety. She was adopted at birth and knows minimal hx about her biological family. She presented as depressed, anxious, and tearful during evaluation today, verbalizes judgements toward self for needing to seek help. 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 She lives with her  who is a retired Marine. They've been  28 years. They have 2 adult children who live in [place] and [place]. Cecy does not have a hx of IP psychiatric treatment or SI, HI, or SIB. She has a hx of OP therapy-1 session several years ago. She stopped going after seeing a client of hers in the waiting room. Today, Cecy reports struggling with a lack of pleasure and interest in things she once enjoyed, trouble sleeping, low energy, poor motivation, constant worry, restlessness, and irritableness. She describes herself as someone who used to be out the door early in the morning, motivated and now she does things because she has to, not because she wants to. When her anxiety is the worst, she finds herself double checking things around the house, not remembering if she completed tasks. Her stressors include her 's mental health, work, and no longer finding yevgeniy in things she once enjoyed. She describes herself caring less at work due to burnout, feeling guilt about this. Cecy currently works out daily and has begun writing. She identifies her  and children as supportive. She seeks therapy to learn coping skills to better manage her depression and anxiety.   She has an initial psychiatric eval with Kathe Rm scheduled for 4/5/22. Course of treatment included : psychoeducation and individual therapy     Progress/Outcome of Treatment Goals (brief summary of course of treatment)   Petra Jacob attended 11 individual therapy sessions. During this time, her treatment goals focused on reducing anxiety. She felt comfortable enough to share her current stressors with her job, relationships, and her dx of Parkinsons. She began to see Cary Olmos at [facility] office for medication management services as well. Modalities utilized in therapy include: Client Centered, Cognitive Behavioral Therapy, Mindfulness, Dialectical Behavioral Therapy. Through therapy, Justyna Das voiced improvement in being able to better manage her anxiety, she was using her family for support, and using additional community resources for Parkinsons dx for support. Cecy was last seen by this clinician on 6/5/23. She cancelled appt on 8/21/23 and did not reschedule. Clinician will discharge Cecy from therapy services as it has been over 90 days without contact with patient and/or her calling the office to schedule an appt. Treatment complications (if any): Parkinsons dx    Treatment Progress: good    Current [facility] Psychiatric Provider: Kathe ENRIQUEZNP    Discharge Medications include: none    Discharge Date: 9/18/23    Discharge Diagnosis:   1. Depression, major, recurrent, mild ([address])            Criteria for Discharge: Clinician will discharge Cecy from therapy services as it has been over 90 days without contact with patient and/or her calling the office to schedule an appt.     Aftercare recommendations include (include specific referral names and phone numbers, if appropriate):   Continue OP psychiatry with Cary Olmos at [facility] office  Continue to use support groups and resources for Parkinsons dx    Prognosis: good

## 2023-09-29 ENCOUNTER — DOCUMENTATION (OUTPATIENT)
Dept: PSYCHIATRY | Facility: CLINIC | Age: 48
End: 2023-09-29

## 2023-10-17 ENCOUNTER — OFFICE VISIT (OUTPATIENT)
Dept: FAMILY MEDICINE CLINIC | Facility: CLINIC | Age: 48
End: 2023-10-17
Payer: OTHER GOVERNMENT

## 2023-10-17 VITALS
OXYGEN SATURATION: 98 % | TEMPERATURE: 99.3 F | HEART RATE: 78 BPM | HEIGHT: 65 IN | SYSTOLIC BLOOD PRESSURE: 110 MMHG | WEIGHT: 166 LBS | DIASTOLIC BLOOD PRESSURE: 68 MMHG | BODY MASS INDEX: 27.66 KG/M2

## 2023-10-17 DIAGNOSIS — R59.1 LYMPHADENOPATHY: ICD-10-CM

## 2023-10-17 DIAGNOSIS — R05.3 PERSISTENT COUGH: Primary | ICD-10-CM

## 2023-10-17 PROCEDURE — 99214 OFFICE O/P EST MOD 30 MIN: CPT | Performed by: STUDENT IN AN ORGANIZED HEALTH CARE EDUCATION/TRAINING PROGRAM

## 2023-10-17 RX ORDER — AZITHROMYCIN 250 MG/1
TABLET, FILM COATED ORAL
Qty: 6 TABLET | Refills: 0 | Status: SHIPPED | OUTPATIENT
Start: 2023-10-17 | End: 2023-10-21

## 2023-10-17 RX ORDER — DEXTROMETHORPHAN HYDROBROMIDE AND PROMETHAZINE HYDROCHLORIDE 15; 6.25 MG/5ML; MG/5ML
5 SYRUP ORAL 4 TIMES DAILY PRN
Qty: 180 ML | Refills: 0 | Status: SHIPPED | OUTPATIENT
Start: 2023-10-17

## 2023-10-17 NOTE — PROGRESS NOTES
Assessment/Plan:         Problem List Items Addressed This Visit    None  Visit Diagnoses     Persistent cough    -  Primary    Relevant Medications    promethazine-dextromethorphan (PHENERGAN-DM) 6.25-15 mg/5 mL oral syrup    Lymphadenopathy        Relevant Medications    azithromycin (ZITHROMAX) 250 mg tablet            Subjective:      Patient ID: Danilo Tolentino is a 50 y.o. female. HPI    10+ days of URI symptoms, sore throat, mild upper gums? sinus pain on the R side, E ear ache, sore thraot and chronic dry cough. Denies any sick contacts, negative home covid test. Limited by what medicines she can take 2/2 to sinemet so not taking any otc medications    The following portions of the patient's history were reviewed and updated as appropriate:   Past Medical History:  She has a past medical history of Anxiety, Depression, Food allergy, Gall stone, Headache, tension-type, History of migraine, Memory loss (noticed november 2019), Migraine, Motion sickness, PONV (postoperative nausea and vomiting), and Wears glasses. ,  _______________________________________________________________________  Medical Problems:  does not have any pertinent problems on file.,  _______________________________________________________________________  Past Surgical History:   has a past surgical history that includes Tonsillectomy; Knee arthroscopy (Left); Refractive surgery (Bilateral); Endometrial ablation; Colonoscopy; [place] tooth extraction; pr laparoscopy w/rmvl adnexal structures (Right, 9/16/2016); pr laps abd prtm&omentum dx w/wo spec br/wa spx (N/A, 9/16/2016); Injection anesthetic agent to cervical plexus; pr laparoscopy surg cholecystectomy (N/A, 5/8/2019); pr laps abd prtm&omentum dx w/wo spec br/wa spx (N/A, 5/8/2019); Breast cyst aspiration (Left); Hysterectomy (2015); and Oophorectomy (Right, 2016). ,  _______________________________________________________________________  Family History:  family history includes Alcohol abuse in her father and maternal grandfather; Cancer in her maternal grandmother; Diabetes in her sister; Heart disease in her maternal grandfather; Hypertension in her maternal grandfather; Migraines in her sister and sister; No Known Problems in her daughter and sister; Ovarian cancer in her maternal aunt; Substance Abuse in her father. She was adopted. ,  _______________________________________________________________________  Social History:   reports that she has never smoked. She has never used smokeless tobacco. She reports current alcohol use of about 1.0 standard drink of alcohol per week. She reports that she does not use drugs. ,  _______________________________________________________________________  Allergies:  is allergic to grass pollen(k-o-r-t-swt kevin), penicillins, codeine, shellfish-derived products - food allergy, tetracyclines & related, trazodone, and ancef [cefazolin]. .  _______________________________________________________________________  Current Outpatient Medications   Medication Sig Dispense Refill   • azithromycin (ZITHROMAX) 250 mg tablet Take 2 tablets today then 1 tablet daily x 4 days 6 tablet 0   • carbidopa-levodopa (SINEMET)  mg per tablet Take 1 tablet by mouth every 6 (six) hours     • cholecalciferol (VITAMIN D3) 1,000 units tablet Take 1 tablet (1,000 Units total) by mouth daily 30 tablet 3   • EPINEPHrine (EPIPEN) 0.3 mg/0.3 mL SOAJ inject 0.3 milliliters intramuscularly immediately for ALLERGIC REACTION     • magnesium Oxide (MAG-OX) 400 mg TABS Take 400 mg by mouth in the morning     • meloxicam (Mobic) 15 mg tablet Take 1 tablet (15 mg total) by mouth daily 30 tablet 3   • Multiple Vitamin (MULTIVITAMIN) capsule Take 1 capsule by mouth daily     • promethazine-dextromethorphan (PHENERGAN-DM) 6.25-15 mg/5 mL oral syrup Take 5 mL by mouth 4 (four) times a day as needed for cough 180 mL 0   • Galcanezumab-gnlm 120 MG/ML SOAJ Inject 120 mg under the skin every 30 (thirty) days (Patient not taking: Reported on 7/17/2023) 1 mL 11   • rizatriptan (MAXALT-MLT) 10 mg disintegrating tablet take 1 tablet by mouth AT MIGRAINE ONSET CAN REPEAT ONCE IN 2 HOURS. MAX 2 TO 3 DAYS A WEEK (Patient not taking: Reported on 7/17/2023) 12 tablet 3     No current facility-administered medications for this visit.     _______________________________________________________________________  Review of Systems   Constitutional:  Positive for fatigue. Negative for activity change, appetite change and fever. HENT:  Positive for rhinorrhea, sinus pressure, sore throat and voice change. Negative for congestion. Eyes:  Negative for visual disturbance. Respiratory:  Positive for cough. Negative for shortness of breath. Cardiovascular:  Negative for chest pain. Gastrointestinal:  Negative for nausea and vomiting. Objective:  Vitals:    10/17/23 1251   BP: 110/68   BP Location: Left arm   Patient Position: Sitting   Cuff Size: Standard   Pulse: 78   Temp: 99.3 °F (37.4 °C)   SpO2: 98%   Weight: 75.3 kg (166 lb)   Height: 5' 5" (1.651 m)     Body mass index is 27.62 kg/m². Physical Exam  Constitutional:       Appearance: Normal appearance. HENT:      Head: Normocephalic and atraumatic. Right Ear: No swelling or tenderness. No middle ear effusion. Tympanic membrane is erythematous and bulging. Tympanic membrane is not retracted. Left Ear: No swelling or tenderness. No middle ear effusion. Tympanic membrane is erythematous. Tympanic membrane is not retracted or bulging. Nose: Mucosal edema, congestion and rhinorrhea present. Rhinorrhea is clear. Mouth/Throat:      Lips: Pink. Mouth: Mucous membranes are moist.      Palate: No mass and lesions. Pharynx: Oropharynx is clear. No pharyngeal swelling, oropharyngeal exudate, posterior oropharyngeal erythema or uvula swelling. Tonsils: No tonsillar exudate or tonsillar abscesses.    Cardiovascular: Rate and Rhythm: Normal rate and regular rhythm. Pulmonary:      Effort: Pulmonary effort is normal. No respiratory distress. Breath sounds: Transmitted upper airway sounds present. Musculoskeletal:      Cervical back: Normal range of motion. Lymphadenopathy:      Cervical: Cervical adenopathy present. Neurological:      Mental Status: She is alert.

## 2023-11-02 ENCOUNTER — APPOINTMENT (OUTPATIENT)
Dept: LAB | Facility: CLINIC | Age: 48
End: 2023-11-02
Payer: OTHER GOVERNMENT

## 2023-11-02 ENCOUNTER — OFFICE VISIT (OUTPATIENT)
Dept: RHEUMATOLOGY | Facility: CLINIC | Age: 48
End: 2023-11-02
Payer: OTHER GOVERNMENT

## 2023-11-02 VITALS
HEART RATE: 80 BPM | SYSTOLIC BLOOD PRESSURE: 120 MMHG | BODY MASS INDEX: 27.66 KG/M2 | OXYGEN SATURATION: 99 % | WEIGHT: 166 LBS | DIASTOLIC BLOOD PRESSURE: 78 MMHG | HEIGHT: 65 IN

## 2023-11-02 DIAGNOSIS — M25.50 POLYARTHRALGIA: ICD-10-CM

## 2023-11-02 PROCEDURE — 99204 OFFICE O/P NEW MOD 45 MIN: CPT | Performed by: INTERNAL MEDICINE

## 2023-11-02 PROCEDURE — 86200 CCP ANTIBODY: CPT

## 2023-11-02 PROCEDURE — 36415 COLL VENOUS BLD VENIPUNCTURE: CPT

## 2023-11-02 NOTE — PROGRESS NOTES
This is a Rheumatology Consult seen at the request of Houston Sanchez PA-C      HPI: Tasha Garcia is a 49 y/o female who presents for further evaluation chronic arthralgias. She hs past medical history migraine headaches, early onset parkinson's disease    She was recently evaluated per Orthopedics    Had a prior h/o tennis elbow and also DDD cervical spine for which she has had steroid injections and developed flushing. Ortho is considering Visco injections for knee OA    Chronic arthralgias b/l knees and right foot. No pain, swelling or stiffness small joints hands/wrist    She used to dance in high school and also had injury from horse. Pain is worse with walking. Denies rashes, psoriasis, inflammatory bowel disease. Occasional neck pain with shoulder pain worse with workouts        --------------------------------------------------------------------------------------------------------        ROS:    + as above with the addition of cough, numbness and tingling (occasionally  hands and feet)     - for: Fevers, Chills or sweats. No HAs or scalp tenderness. No jaw claudication. No acute visual or eye changes. No dry eyes. No auditory complaints. No oral lesions or ulcers. No dry mouth. No chest pains or palpitations. No shortness of breath, dyspnea on exertion or wheezing. No hemotpysis. No abdominal pain, GERD symptoms, diarrhea or constipation. No urinary complaints. No rashes.     All other ROS was reviewed and negative except as above         --------------------------------------------------------------------------------------------------------    Past Medical History    Past Medical History:   Diagnosis Date    Anxiety     Depression     Food allergy     scallops    Gall stone     lap ari today 5/8/2019    Headache, tension-type     History of migraine     Memory loss noticed november 2019    Im having issues remembering names, words, etc.    Migraine     Motion sickness PONV (postoperative nausea and vomiting)     Wears glasses            Past Surgical History    Past Surgical History:   Procedure Laterality Date    BREAST CYST ASPIRATION Left     benign-age 11-14    COLONOSCOPY      ENDOMETRIAL ABLATION      HYSTERECTOMY  2015    Partial    INJECTION ANESTHETIC AGENT TO CERVICAL PLEXUS      KNEE ARTHROSCOPY Left     OOPHORECTOMY Right 2016    TN LAPAROSCOPY SURG CHOLECYSTECTOMY N/A 5/8/2019    Procedure: LAP JULES w/ IOC;  Surgeon: Katrin Cordero MD;  Location: [facility];  Service: General    TN LAPAROSCOPY W/RMVL ADNEXAL STRUCTURES Right 9/16/2016    Procedure:  Anup Hernández;  Surgeon: Fabiola Banks DO;  Location: [facility];  Service: Gynecology    TN LAPS ABD PRTM&OMENTUM DX W/WO SPEC BR/[address] N/A 9/16/2016    Procedure: LAPAROSCOPY DIAGNOSTIC;  Surgeon: Fabiola Banks DO;  Location: [facility];  Service: Gynecology    TN LAPS ABD PRTM&OMENTUM DX W/WO SPEC BR/WA [address] [place] [address] N/A 5/8/2019    Procedure: LAPAROSCOPY DIAGNOSTIC;  Surgeon: Katrin Cordero MD;  Location: [facility];  Service: General    REFRACTIVE SURGERY Bilateral     TONSILLECTOMY      WISDOM TOOTH EXTRACTION             Family History    Family History   Adopted: Yes   Problem Relation Age of Onset    Alcohol abuse Father     Substance Abuse Father     Diabetes Sister     Migraines Sister     No Known Problems Sister     No Known Problems Daughter     Cancer Maternal Grandmother     Alcohol abuse Maternal Grandfather     Heart disease Maternal Grandfather     Hypertension Maternal Grandfather     Ovarian cancer Maternal Aunt     Migraines Sister     Breast cancer Neg Hx             Social History    Social History     Tobacco Use    Smoking status: Never    Smokeless tobacco: Never   Vaping Use    Vaping Use: Never used   Substance Use Topics    Alcohol use: Yes     Alcohol/week: 1.0 standard drink of alcohol     Types: 1 Glasses of wine per week     Comment: Socially    Drug use:  No     Allergies    Allergies   Allergen Reactions    Grass Pollen(K-O-R-T-Swt [place]) Shortness Of Breath     Fresh cut, breathing     Penicillins Hives    Codeine GI Intolerance    Shellfish-Derived Products - Food Allergy Other (See Comments)     Intolerance to scallops -reaction severe vomitting    Tetracyclines & Related GI Intolerance    Trazodone Other (See Comments)     Joint pain/myalgia    Ancef [Cefazolin] Tachycardia     Pt states she is unaware of the tachycardia but believes she had hives instead. Medications    Current Outpatient Medications   Medication Instructions    carbidopa-levodopa (SINEMET)  mg per tablet 1 tablet, Oral, Every 6 hours    cholecalciferol (VITAMIN D3) 1,000 Units, Oral, Daily    EPINEPHrine (EPIPEN) 0.3 mg/0.3 mL SOAJ inject 0.3 milliliters intramuscularly immediately for ALLERGIC REACTION    Galcanezumab-gnlm 120 mg, Subcutaneous, Every 30 days    magnesium Oxide (MAG-OX) 400 mg, Oral, Daily    meloxicam (MOBIC) 15 mg, Oral, Daily    Multiple Vitamin (MULTIVITAMIN) capsule 1 capsule, Oral, Daily    promethazine-dextromethorphan (PHENERGAN-DM) 6.25-15 mg/5 mL oral syrup 5 mL, Oral, 4 times daily PRN    rizatriptan (MAXALT-MLT) 10 mg disintegrating tablet take 1 tablet by mouth AT MIGRAINE ONSET CAN REPEAT ONCE IN 2 HOURS. MAX 2 TO 3 DAYS A WEEK          Physical Exam    /78   Pulse 80   Ht 5' 5" (1.651 m)   Wt 75.3 kg (166 lb)   LMP  (LMP Unknown)   SpO2 99%   BMI 27.62 kg/m²     GEN: AAO, No apparent distress. Patient is well developed. HEENT:  Pupils are equal, round and reactive. Sclera are clear. Fundoscopic exam is normal.  External ears are without lesions. Oral pharynx is clear of ulcers or other lesions. MMM. NECK:  Supple. There is no adenopathy appreciable in anterior or posterior cervical chains or supraclavicularly. JVP is normal.    HEART: Regular rate and rhythm. There is no appreciable murmur, gallop or rub.   LUNGS: Clear to auscultation. ABD:  Soft, without tenderness, rebound or guarding. No appreciable organomegally. NEURO: Speech and cognition are normal.  Strength is 5/5 throughout. Tone is normal.  DTRs are 2/4 at the knees, ankles and elbows. Gait is normal.  SKIN: There are no rashes or lesions    MUSCULOSKELETAL:   + lou OA      ________________________________________________________________________          Results Review    Component      Latest Ref Rng 7/7/2023   Vit D, 25-Hydroxy      30.0 - 100.0 ng/mL 39.8    HUMBERTO      Negative  Negative    Lyme Total Antibodies      0.2 - 8.0 AI 0.2        Component      Latest Ref Rng 7/7/2023   RHEUMATOID FACTOR      Negative  Negative          Impressions and Plans:    Gabbi Arredondo is a 49 y/o female with clinical s/s osteoarthritis with classical lou OA hands  I have reviewed OA and provided handouts  Agree with Mobic (called in per Ortho)  Labs including HUMBERTO and RF negative  Check CCP ab  Will review test results and f/u as needed      Thank you for involving me in this patient's care.         Ryan Dooley MD  Department of Rheumatology  [facility]

## 2023-11-08 LAB — CCP AB SER IA-ACNC: 2

## 2023-11-18 DIAGNOSIS — N95.1 MENOPAUSAL SYNDROME (HOT FLASHES): Primary | ICD-10-CM

## 2023-11-18 RX ORDER — ACETAMINOPHEN AND CODEINE PHOSPHATE 120; 12 MG/5ML; MG/5ML
1 SOLUTION ORAL DAILY
Qty: 90 TABLET | Refills: 1 | Status: SHIPPED | OUTPATIENT
Start: 2023-11-18 | End: 2024-05-16

## 2023-11-21 ENCOUNTER — OFFICE VISIT (OUTPATIENT)
Dept: FAMILY MEDICINE CLINIC | Facility: CLINIC | Age: 48
End: 2023-11-21
Payer: OTHER GOVERNMENT

## 2023-11-21 VITALS
HEART RATE: 73 BPM | WEIGHT: 167 LBS | TEMPERATURE: 98 F | OXYGEN SATURATION: 98 % | SYSTOLIC BLOOD PRESSURE: 110 MMHG | HEIGHT: 65 IN | DIASTOLIC BLOOD PRESSURE: 72 MMHG | BODY MASS INDEX: 27.82 KG/M2

## 2023-11-21 DIAGNOSIS — R05.3 CHRONIC COUGH: Primary | ICD-10-CM

## 2023-11-21 DIAGNOSIS — R49.0 HOARSE VOICE QUALITY: ICD-10-CM

## 2023-11-21 PROCEDURE — 99214 OFFICE O/P EST MOD 30 MIN: CPT | Performed by: STUDENT IN AN ORGANIZED HEALTH CARE EDUCATION/TRAINING PROGRAM

## 2023-11-21 RX ORDER — FAMOTIDINE 20 MG/1
20 TABLET, FILM COATED ORAL 2 TIMES DAILY
Qty: 60 TABLET | Refills: 0 | Status: SHIPPED | OUTPATIENT
Start: 2023-11-21

## 2023-11-21 RX ORDER — FLUTICASONE PROPIONATE 50 MCG
1 SPRAY, SUSPENSION (ML) NASAL DAILY
Qty: 11.1 ML | Refills: 0 | Status: SHIPPED | OUTPATIENT
Start: 2023-11-21

## 2023-11-21 NOTE — PROGRESS NOTES
Assessment/Plan:         Problem List Items Addressed This Visit    None  Visit Diagnoses     Chronic cough    -  Primary    Relevant Medications    fluticasone (FLONASE) 50 mcg/act nasal spray    famotidine (PEPCID) 20 mg tablet    Other Relevant Orders    XR chest pa & lateral    Complete PFT    Ambulatory Referral to Otolaryngology    Hoarse voice quality            Perhaps allergen induced? Given the new hoarseness of the voice will have her see ENT, may benefit from visuzlizaton of vocal cords. Given improvement on tums, recommend daily pepcid as it may be silent reflux    Subjective:      Patient ID: Jackson Joe is a 50 y.o. female. Cough  Pertinent negatives include no chest pain, fever, rhinorrhea, sore throat or shortness of breath. Continued cough about 2 months, possible RSV exposure 1 month ago but had symptoms prior. Cannot take anti histmaines 2/2 sinemet usage. Symptoms are irregular and no known exacerbation factors. Has tried sudafed once and believes symptoms may have worsened. Has tried to take tums at night, symptoms overnight did improve. Denies any heart burn or symptoms worsened with ETOH, red sauces, etc. Normal radon testing and otherwise clean house. Dog does not exacerbate symptoms     The following portions of the patient's history were reviewed and updated as appropriate:   Past Medical History:  She has a past medical history of Anxiety, Depression, Food allergy, Gall stone, Headache, tension-type, History of migraine, Memory loss (noticed november 2019), Migraine, Motion sickness, PONV (postoperative nausea and vomiting), and Wears glasses. ,  _______________________________________________________________________  Medical Problems:  does not have any pertinent problems on file.,  _______________________________________________________________________  Past Surgical History:   has a past surgical history that includes Tonsillectomy; Knee arthroscopy (Left);  Refractive surgery (Bilateral); Endometrial ablation; Colonoscopy; [place] tooth extraction; pr laparoscopy w/rmvl adnexal structures (Right, 9/16/2016); pr laps abd prtm&omentum dx w/wo spec br/wa spx (N/A, 9/16/2016); Injection anesthetic agent to cervical plexus; pr laparoscopy surg cholecystectomy (N/A, 5/8/2019); pr laps abd prtm&omentum dx w/wo spec br/wa spx (N/A, 5/8/2019); Breast cyst aspiration (Left); Hysterectomy (2015); and Oophorectomy (Right, 2016). ,  _______________________________________________________________________  Family History:  family history includes Alcohol abuse in her father and maternal grandfather; Cancer in her maternal grandmother; Diabetes in her sister; Heart disease in her maternal grandfather; Hypertension in her maternal grandfather; Migraines in her sister and sister; No Known Problems in her daughter and sister; Ovarian cancer in her maternal aunt; Substance Abuse in her father. She was adopted. ,  _______________________________________________________________________  Social History:   reports that she has never smoked. She has never used smokeless tobacco. She reports current alcohol use of about 1.0 standard drink of alcohol per week. She reports that she does not use drugs. ,  _______________________________________________________________________  Allergies:  is allergic to grass pollen(k-o-r-t-swt kevin), penicillins, codeine, shellfish-derived products - food allergy, tetracyclines & related, trazodone, and ancef [cefazolin]. .  _______________________________________________________________________  Current Outpatient Medications   Medication Sig Dispense Refill   • carbidopa-levodopa (SINEMET)  mg per tablet Take 1 tablet by mouth every 6 (six) hours     • cholecalciferol (VITAMIN D3) 1,000 units tablet Take 1 tablet (1,000 Units total) by mouth daily 30 tablet 3   • EPINEPHrine (EPIPEN) 0.3 mg/0.3 mL SOAJ inject 0.3 milliliters intramuscularly immediately for ALLERGIC REACTION     • famotidine (PEPCID) 20 mg tablet Take 1 tablet (20 mg total) by mouth 2 (two) times a day 60 tablet 0   • fluticasone (FLONASE) 50 mcg/act nasal spray 1 spray into each nostril daily 11.1 mL 0   • Galcanezumab-gnlm 120 MG/ML SOAJ Inject 120 mg under the skin every 30 (thirty) days 1 mL 11   • magnesium Oxide (MAG-OX) 400 mg TABS Take 400 mg by mouth in the morning     • meloxicam (Mobic) 15 mg tablet Take 1 tablet (15 mg total) by mouth daily 30 tablet 3   • Multiple Vitamin (MULTIVITAMIN) capsule Take 1 capsule by mouth daily     • norethindrone (MICRONOR) 0.35 MG tablet Take 1 tablet (0.35 mg total) by mouth daily 90 tablet 1     No current facility-administered medications for this visit.     _______________________________________________________________________  Review of Systems   Constitutional:  Negative for activity change, appetite change, fatigue and fever. HENT:  Positive for voice change. Negative for congestion, rhinorrhea and sore throat. Eyes:  Negative for visual disturbance. Respiratory:  Positive for cough. Negative for shortness of breath. Cardiovascular:  Negative for chest pain. Gastrointestinal:  Negative for nausea and vomiting. Objective:  Vitals:    11/21/23 1041   BP: 110/72   BP Location: Left arm   Patient Position: Sitting   Cuff Size: Standard   Pulse: 73   Temp: 98 °F (36.7 °C)   TempSrc: Tympanic   SpO2: 98%   Weight: 75.8 kg (167 lb)   Height: 5' 5" (1.651 m)     Body mass index is 27.79 kg/m². Physical Exam  Constitutional:       General: She is not in acute distress. Appearance: Normal appearance. She is not ill-appearing. HENT:      Head: Normocephalic and atraumatic. Right Ear: Tympanic membrane, ear canal and external ear normal. There is no impacted cerumen. Left Ear: Tympanic membrane, ear canal and external ear normal. There is no impacted cerumen. Nose: Nose normal. No congestion or rhinorrhea.    Pulmonary: Effort: Pulmonary effort is normal. No respiratory distress. Breath sounds: Normal breath sounds. No stridor. No wheezing, rhonchi or rales. Chest:      Chest wall: No tenderness. Musculoskeletal:      Cervical back: Normal range of motion. Lymphadenopathy:      Cervical: No cervical adenopathy. Neurological:      Mental Status: She is alert.

## 2023-11-24 ENCOUNTER — HOSPITAL ENCOUNTER (OUTPATIENT)
Dept: RADIOLOGY | Facility: HOSPITAL | Age: 48
End: 2023-11-24
Payer: OTHER GOVERNMENT

## 2023-11-24 DIAGNOSIS — R05.3 CHRONIC COUGH: ICD-10-CM

## 2023-11-24 PROCEDURE — 71046 X-RAY EXAM CHEST 2 VIEWS: CPT

## 2023-12-01 DIAGNOSIS — R05.3 CHRONIC COUGH: Primary | ICD-10-CM

## 2023-12-01 RX ORDER — AZITHROMYCIN 250 MG/1
TABLET, FILM COATED ORAL
Qty: 6 TABLET | Refills: 0 | Status: SHIPPED | OUTPATIENT
Start: 2023-12-01 | End: 2023-12-05

## 2023-12-04 ENCOUNTER — HOSPITAL ENCOUNTER (OUTPATIENT)
Dept: PULMONOLOGY | Facility: HOSPITAL | Age: 48
Discharge: HOME | End: 2023-12-04
Payer: OTHER GOVERNMENT

## 2023-12-04 DIAGNOSIS — R05.3 CHRONIC COUGH: ICD-10-CM

## 2023-12-04 PROCEDURE — 94726 PLETHYSMOGRAPHY LUNG VOLUMES: CPT

## 2023-12-04 PROCEDURE — 94060 EVALUATION OF WHEEZING: CPT

## 2023-12-04 PROCEDURE — 94729 DIFFUSING CAPACITY: CPT

## 2023-12-04 PROCEDURE — 94729 DIFFUSING CAPACITY: CPT | Performed by: INTERNAL MEDICINE

## 2023-12-04 PROCEDURE — 94760 N-INVAS EAR/PLS OXIMETRY 1: CPT

## 2023-12-04 PROCEDURE — 94726 PLETHYSMOGRAPHY LUNG VOLUMES: CPT | Performed by: INTERNAL MEDICINE

## 2023-12-04 PROCEDURE — 94060 EVALUATION OF WHEEZING: CPT | Performed by: INTERNAL MEDICINE

## 2023-12-04 RX ORDER — ALBUTEROL SULFATE 2.5 MG/3ML
2.5 SOLUTION RESPIRATORY (INHALATION) ONCE AS NEEDED
Status: COMPLETED | OUTPATIENT
Start: 2023-12-04 | End: 2023-12-04

## 2023-12-04 RX ADMIN — ALBUTEROL SULFATE 2.5 MG: 2.5 SOLUTION RESPIRATORY (INHALATION) at 08:19

## 2023-12-14 DIAGNOSIS — R05.3 CHRONIC COUGH: ICD-10-CM

## 2023-12-14 RX ORDER — FAMOTIDINE 20 MG/1
20 TABLET, FILM COATED ORAL 2 TIMES DAILY
Qty: 60 TABLET | Refills: 0 | Status: SHIPPED | OUTPATIENT
Start: 2023-12-14

## 2024-01-10 DIAGNOSIS — R05.3 CHRONIC COUGH: ICD-10-CM

## 2024-01-10 RX ORDER — FAMOTIDINE 20 MG/1
20 TABLET, FILM COATED ORAL 2 TIMES DAILY
Qty: 60 TABLET | Refills: 0 | Status: SHIPPED | OUTPATIENT
Start: 2024-01-10

## 2024-01-31 DIAGNOSIS — R05.3 CHRONIC COUGH: ICD-10-CM

## 2024-01-31 RX ORDER — FAMOTIDINE 20 MG/1
20 TABLET, FILM COATED ORAL 2 TIMES DAILY
Qty: 60 TABLET | Refills: 0 | Status: SHIPPED | OUTPATIENT
Start: 2024-01-31

## 2024-02-04 DIAGNOSIS — T78.1XXA ALLERGIC REACTION TO TREE NUT: Primary | ICD-10-CM

## 2024-02-05 RX ORDER — EPINEPHRINE 0.3 MG/.3ML
0.3 INJECTION SUBCUTANEOUS ONCE
Qty: 0.3 ML | Refills: 1 | Status: SHIPPED | OUTPATIENT
Start: 2024-02-05 | End: 2024-02-05

## 2024-03-28 ENCOUNTER — OFFICE VISIT (OUTPATIENT)
Dept: FAMILY MEDICINE CLINIC | Facility: CLINIC | Age: 49
End: 2024-03-28
Payer: OTHER GOVERNMENT

## 2024-03-28 VITALS
HEART RATE: 88 BPM | DIASTOLIC BLOOD PRESSURE: 62 MMHG | TEMPERATURE: 99.5 F | OXYGEN SATURATION: 99 % | SYSTOLIC BLOOD PRESSURE: 100 MMHG | HEIGHT: 65 IN | WEIGHT: 165.6 LBS | BODY MASS INDEX: 27.59 KG/M2

## 2024-03-28 DIAGNOSIS — R22.1 NECK MASS: Primary | ICD-10-CM

## 2024-03-28 PROCEDURE — 99213 OFFICE O/P EST LOW 20 MIN: CPT | Performed by: PHYSICIAN ASSISTANT

## 2024-03-28 NOTE — PROGRESS NOTES
Assessment/Plan:          Diagnoses and all orders for this visit:    Neck mass  -     US thyroid; Future  -     TSH, 3rd generation with Free T4 reflex; Future  -     T3, free; Future  -     Comprehensive metabolic panel; Future  -     PTH, intact; Future  -     CBC and differential; Future            Subjective:      Patient ID: Cecy Jacob is a 48 y.o. female.    Pt had X-rays of spine at chiropractor. A 13 mm calcified lesion noted in left paracervical tissue. Pt is not having any unusual symptoms. She does say that she has a sensation that food gets stuck sometimes.         The following portions of the patient's history were reviewed and updated as appropriate:   She has a past medical history of Allergic (When I was a child.), Anxiety, Depression, Food allergy, Gall stone, Headache(784.0) (Years), Headache, tension-type, History of migraine, Memory loss (noticed november 2019), Migraine, Motion sickness, PONV (postoperative nausea and vomiting), and Wears glasses.,  does not have any pertinent problems on file.,   has a past surgical history that includes Tonsillectomy; Knee arthroscopy (Left); Refractive surgery (Bilateral); Endometrial ablation; Colonoscopy; [place] tooth extraction; pr laparoscopy w/rmvl adnexal structures (Right, 09/16/2016); pr laps abd prtm&omentum dx w/wo spec br/wa spx (N/A, 09/16/2016); Injection anesthetic agent to cervical plexus; pr laparoscopy surg cholecystectomy (N/A, 05/08/2019); pr laps abd prtm&omentum dx w/wo spec br/wa spx (N/A, 05/08/2019); Breast cyst aspiration (Left); Hysterectomy (2015); Oophorectomy (Right, 2016); Cholecystectomy (June 2019); and Eye surgery (lasik surgery in 2005).,  family history includes Alcohol abuse in her father and maternal grandfather; Cancer in her maternal grandmother; Diabetes in her sister; Heart disease in her maternal grandfather; Hypertension in her maternal grandfather; Migraines in her sister and sister; No Known Problems in  her daughter and sister; Ovarian cancer in her maternal aunt; Substance Abuse in her father. She was adopted.,   reports that she has never smoked. She has never used smokeless tobacco. She reports current alcohol use of about 1.0 standard drink of alcohol per week. She reports that she does not use drugs.,  is allergic to grass pollen(k-o-r-t-swt kevin), penicillins, codeine, shellfish-derived products - food allergy, tetracyclines & related, trazodone, and ancef [cefazolin]..  Current Outpatient Medications   Medication Sig Dispense Refill   • carbidopa-levodopa (SINEMET CR)  mg per tablet Take 1 tablet by mouth daily at bedtime     • carbidopa-levodopa (SINEMET)  mg per tablet Take 1 tablet by mouth every 6 (six) hours     • cholecalciferol (VITAMIN D3) 1,000 units tablet Take 1 tablet (1,000 Units total) by mouth daily 30 tablet 3   • EPINEPHrine (EPIPEN) 0.3 mg/0.3 mL SOAJ Inject 0.3 mL (0.3 mg total) into a muscle once for 1 dose 0.3 mL 1   • famotidine (PEPCID) 20 mg tablet take 1 tablet by mouth twice a day 60 tablet 0   • fluticasone (FLONASE) 50 mcg/act nasal spray 1 spray into each nostril daily (Patient taking differently: 1 spray into each nostril if needed for allergies) 11.1 mL 0   • Galcanezumab-gnlm 120 MG/ML SOAJ Inject 120 mg under the skin every 30 (thirty) days 1 mL 11   • magnesium Oxide (MAG-OX) 400 mg TABS Take 400 mg by mouth in the morning     • meloxicam (Mobic) 15 mg tablet Take 1 tablet (15 mg total) by mouth daily (Patient taking differently: Take 15 mg by mouth if needed for moderate pain) 30 tablet 3   • norethindrone (MICRONOR) 0.35 MG tablet Take 1 tablet (0.35 mg total) by mouth daily 90 tablet 1   • Multiple Vitamin (MULTIVITAMIN) capsule Take 1 capsule by mouth daily (Patient not taking: Reported on 3/28/2024)       No current facility-administered medications for this visit.       Review of Systems   Constitutional:  Negative for chills, diaphoresis, fatigue and  "fever.   HENT:  Negative for congestion and sore throat.    Eyes:  Negative for pain and visual disturbance.   Respiratory:  Negative for cough, chest tightness and shortness of breath.    Cardiovascular:  Negative for palpitations and leg swelling.   Gastrointestinal:  Negative for abdominal pain, constipation, diarrhea and nausea.   Endocrine: Negative for polydipsia, polyphagia and polyuria.   Genitourinary:  Negative for difficulty urinating.   Musculoskeletal:  Negative for arthralgias and myalgias.   Neurological:  Negative for dizziness, light-headedness, numbness and headaches.         Objective:  Vitals:    03/28/24 1045   BP: 100/62   BP Location: Left arm   Patient Position: Sitting   Cuff Size: Standard   Pulse: 88   Temp: 99.5 °F (37.5 °C)   TempSrc: Tympanic   SpO2: 99%   Weight: 75.1 kg (165 lb 9.6 oz)   Height: 5' 5\" (1.651 m)     Body mass index is 27.56 kg/m².     Physical Exam  Vitals and nursing note reviewed.   Constitutional:       Appearance: Normal appearance.   HENT:      Head: Normocephalic.   Neck:      Thyroid: Thyroid mass present. No thyroid tenderness.   Cardiovascular:      Rate and Rhythm: Normal rate.      Heart sounds: Normal heart sounds.   Pulmonary:      Effort: Pulmonary effort is normal.      Breath sounds: Normal breath sounds.   Musculoskeletal:      Cervical back: Normal range of motion. No rigidity or tenderness.   Lymphadenopathy:      Cervical: No cervical adenopathy.      Right cervical: No superficial, deep or posterior cervical adenopathy.     Left cervical: No superficial, deep or posterior cervical adenopathy.   Neurological:      Mental Status: She is alert and oriented to person, place, and time.   Psychiatric:         Mood and Affect: Mood normal.         Behavior: Behavior normal.           "

## 2024-03-29 ENCOUNTER — APPOINTMENT (OUTPATIENT)
Dept: LAB | Facility: CLINIC | Age: 49
End: 2024-03-29
Payer: OTHER GOVERNMENT

## 2024-03-29 DIAGNOSIS — R22.1 NECK MASS: ICD-10-CM

## 2024-03-29 LAB
ALBUMIN SERPL BCP-MCNC: 4.1 G/DL (ref 3.5–5)
ALP SERPL-CCNC: 73 U/L (ref 34–104)
ALT SERPL W P-5'-P-CCNC: 6 U/L (ref 7–52)
ANION GAP SERPL CALCULATED.3IONS-SCNC: 4 MMOL/L (ref 4–13)
AST SERPL W P-5'-P-CCNC: 25 U/L (ref 13–39)
BASOPHILS # BLD AUTO: 0.04 THOUSANDS/ÂΜL (ref 0–0.1)
BASOPHILS NFR BLD AUTO: 1 % (ref 0–1)
BILIRUB SERPL-MCNC: 1.66 MG/DL (ref 0.2–1)
BUN SERPL-MCNC: 13 MG/DL (ref 5–25)
CALCIUM SERPL-MCNC: 8.9 MG/DL (ref 8.4–10.2)
CHLORIDE SERPL-SCNC: 103 MMOL/L (ref 96–108)
CO2 SERPL-SCNC: 32 MMOL/L (ref 21–32)
CREAT SERPL-MCNC: 0.77 MG/DL (ref 0.6–1.3)
EOSINOPHIL # BLD AUTO: 0.11 THOUSAND/ÂΜL (ref 0–0.61)
EOSINOPHIL NFR BLD AUTO: 2 % (ref 0–6)
ERYTHROCYTE [DISTWIDTH] IN BLOOD BY AUTOMATED COUNT: 12.3 % (ref 11.6–15.1)
GFR SERPL CREATININE-BSD FRML MDRD: 90 ML/MIN/1.73SQ M
GLUCOSE P FAST SERPL-MCNC: 82 MG/DL (ref 65–99)
HCT VFR BLD AUTO: 45.8 % (ref 34.8–46.1)
HGB BLD-MCNC: 15 G/DL (ref 11.5–15.4)
IMM GRANULOCYTES # BLD AUTO: 0 THOUSAND/UL (ref 0–0.2)
IMM GRANULOCYTES NFR BLD AUTO: 0 % (ref 0–2)
LYMPHOCYTES # BLD AUTO: 1.48 THOUSANDS/ÂΜL (ref 0.6–4.47)
LYMPHOCYTES NFR BLD AUTO: 28 % (ref 14–44)
MCH RBC QN AUTO: 30.1 PG (ref 26.8–34.3)
MCHC RBC AUTO-ENTMCNC: 32.8 G/DL (ref 31.4–37.4)
MCV RBC AUTO: 92 FL (ref 82–98)
MONOCYTES # BLD AUTO: 0.46 THOUSAND/ÂΜL (ref 0.17–1.22)
MONOCYTES NFR BLD AUTO: 9 % (ref 4–12)
NEUTROPHILS # BLD AUTO: 3.21 THOUSANDS/ÂΜL (ref 1.85–7.62)
NEUTS SEG NFR BLD AUTO: 60 % (ref 43–75)
NRBC BLD AUTO-RTO: 0 /100 WBCS
PLATELET # BLD AUTO: 262 THOUSANDS/UL (ref 149–390)
PMV BLD AUTO: 10.4 FL (ref 8.9–12.7)
POTASSIUM SERPL-SCNC: 4.2 MMOL/L (ref 3.5–5.3)
PROT SERPL-MCNC: 6.9 G/DL (ref 6.4–8.4)
PTH-INTACT SERPL-MCNC: 39.2 PG/ML (ref 12–88)
RBC # BLD AUTO: 4.99 MILLION/UL (ref 3.81–5.12)
SODIUM SERPL-SCNC: 139 MMOL/L (ref 135–147)
T3FREE SERPL-MCNC: 3.63 PG/ML (ref 2.5–3.9)
TSH SERPL DL<=0.05 MIU/L-ACNC: 2.52 UIU/ML (ref 0.45–4.5)
WBC # BLD AUTO: 5.3 THOUSAND/UL (ref 4.31–10.16)

## 2024-03-29 PROCEDURE — 84481 FREE ASSAY (FT-3): CPT

## 2024-03-29 PROCEDURE — 85025 COMPLETE CBC W/AUTO DIFF WBC: CPT

## 2024-03-29 PROCEDURE — 83970 ASSAY OF PARATHORMONE: CPT

## 2024-03-29 PROCEDURE — 36415 COLL VENOUS BLD VENIPUNCTURE: CPT

## 2024-03-29 PROCEDURE — 80053 COMPREHEN METABOLIC PANEL: CPT

## 2024-03-29 PROCEDURE — 84443 ASSAY THYROID STIM HORMONE: CPT

## 2024-04-01 DIAGNOSIS — G43.009 MIGRAINE WITHOUT AURA AND WITHOUT STATUS MIGRAINOSUS, NOT INTRACTABLE: ICD-10-CM

## 2024-04-01 RX ORDER — GALCANEZUMAB 120 MG/ML
INJECTION, SOLUTION SUBCUTANEOUS
Qty: 1 ML | Refills: 11 | Status: SHIPPED | OUTPATIENT
Start: 2024-04-01

## 2024-04-03 ENCOUNTER — HOSPITAL ENCOUNTER (OUTPATIENT)
Dept: ULTRASOUND IMAGING | Facility: CLINIC | Age: 49
Discharge: HOME | End: 2024-04-03
Payer: OTHER GOVERNMENT

## 2024-04-03 DIAGNOSIS — R22.1 NECK MASS: ICD-10-CM

## 2024-04-03 DIAGNOSIS — Z00.6 ENCOUNTER FOR EXAMINATION FOR NORMAL COMPARISON OR CONTROL IN CLINICAL RESEARCH PROGRAM: ICD-10-CM

## 2024-04-03 PROCEDURE — 76536 US EXAM OF HEAD AND NECK: CPT

## 2024-04-18 ENCOUNTER — APPOINTMENT (OUTPATIENT)
Dept: LAB | Facility: CLINIC | Age: 49
End: 2024-04-18

## 2024-04-18 DIAGNOSIS — Z00.6 ENCOUNTER FOR EXAMINATION FOR NORMAL COMPARISON OR CONTROL IN CLINICAL RESEARCH PROGRAM: ICD-10-CM

## 2024-04-18 PROCEDURE — 36415 COLL VENOUS BLD VENIPUNCTURE: CPT

## 2024-05-06 LAB
APOB+LDLR+PCSK9 GENE MUT ANL BLD/T: NOT DETECTED
BRCA1+BRCA2 DEL+DUP + FULL MUT ANL BLD/T: NOT DETECTED
MLH1+MSH2+MSH6+PMS2 GN DEL+DUP+FUL M: NOT DETECTED

## 2024-05-13 ENCOUNTER — OFFICE VISIT (OUTPATIENT)
Dept: GYNECOLOGY | Facility: CLINIC | Age: 49
End: 2024-05-13
Payer: OTHER GOVERNMENT

## 2024-05-13 VITALS — WEIGHT: 170.6 LBS | DIASTOLIC BLOOD PRESSURE: 70 MMHG | BODY MASS INDEX: 28.39 KG/M2 | SYSTOLIC BLOOD PRESSURE: 106 MMHG

## 2024-05-13 DIAGNOSIS — Z90.711 STATUS POST LAPAROSCOPIC SUPRACERVICAL HYSTERECTOMY: ICD-10-CM

## 2024-05-13 DIAGNOSIS — G20.A1 EARLY-ONSET PARKINSON'S DISEASE: ICD-10-CM

## 2024-05-13 DIAGNOSIS — N95.1 MENOPAUSAL SYNDROME (HOT FLASHES): Primary | ICD-10-CM

## 2024-05-13 DIAGNOSIS — E04.1 THYROID NODULE: ICD-10-CM

## 2024-05-13 PROCEDURE — 99213 OFFICE O/P EST LOW 20 MIN: CPT | Performed by: OBSTETRICS & GYNECOLOGY

## 2024-05-13 RX ORDER — ACETAMINOPHEN AND CODEINE PHOSPHATE 120; 12 MG/5ML; MG/5ML
1 SOLUTION ORAL DAILY
Qty: 90 TABLET | Refills: 1 | Status: SHIPPED | OUTPATIENT
Start: 2024-05-13 | End: 2024-11-09

## 2024-05-13 NOTE — PROGRESS NOTES
Assessment/Plan:    Diagnoses and all orders for this visit:    Menopausal syndrome (hot flashes)  -     norethindrone (MICRONOR) 0.35 MG tablet; Take 1 tablet (0.35 mg total) by mouth daily    Status post laparoscopic supracervical hysterectomy    Early-onset Parkinson's disease    Thyroid nodule        Subjective: Here for follow-up     Patient ID: eCcy Jacob is a 48 y.o. female.    HPI  48-year-old female,  2 para 2 this post supracervical laparoscopic hysterectomy in .  Overall doing well.  She is currently being treated for Parkinson's disease.  Does have some history of migraine headaches.  Was having some minor menopausal symptoms with hot flashes and occasional night sweats.  We started her on norethindrone 0.35 mg approximately 6 months ago here today for follow-up.  States overall she feels improved states her hot flashes and night sweats are stable and infrequent on this medication.  Would like to continue.  Recent laboratory studies within normal limits.  Had a thyroid scan which did recommend possible biopsy or follow-up.  Primary care feels no further follow-up indicated at this time.  Patient to schedule follow-up with endocrinology.  I did recommend she contact her PCP for further follow-up regarding abnormal thyroid scan.  She is scheduled for an annual exam in August and will follow-up at that time.  All questions answered.    Review of Systems unchanged    Objective: No acute distress  /70 (BP Location: Left arm, Patient Position: Sitting, Cuff Size: Standard)   Wt 77.4 kg (170 lb 9.6 oz)   LMP  (LMP Unknown)   BMI 28.39 kg/m²      Physical Exam  Vitals and nursing note reviewed.   Constitutional:       Appearance: Normal appearance.   Eyes:      Pupils: Pupils are equal, round, and reactive to light.   Pulmonary:      Effort: Pulmonary effort is normal.   Genitourinary:     Comments: Pelvic exam deferred  Musculoskeletal:         General: Normal range of motion.    Neurological:      Mental Status: She is alert and oriented to person, place, and time.   Psychiatric:         Mood and Affect: Mood normal.         Behavior: Behavior normal.         Thought Content: Thought content normal.         Judgment: Judgment normal.        Please note    In addition to the time spent discussing the findings and results of today's visit and exam, I spent approximately 20 minutes of face-to-face time with the patient, greater than 50% of which was spent in counseling and coordination of care for this patient.

## 2024-07-12 DIAGNOSIS — M62.9 MUSCLE DISORDER: Primary | ICD-10-CM

## 2024-07-12 DIAGNOSIS — G43.009 MIGRAINE WITHOUT AURA AND WITHOUT STATUS MIGRAINOSUS, NOT INTRACTABLE: Primary | ICD-10-CM

## 2024-07-12 DIAGNOSIS — M17.0 PRIMARY OSTEOARTHRITIS OF BOTH KNEES: ICD-10-CM

## 2024-07-12 DIAGNOSIS — R05.3 CHRONIC COUGH: ICD-10-CM

## 2024-07-12 RX ORDER — RIZATRIPTAN BENZOATE 10 MG/1
10 TABLET, ORALLY DISINTEGRATING ORAL AS NEEDED
Qty: 10 TABLET | Refills: 2 | Status: SHIPPED | OUTPATIENT
Start: 2024-07-12

## 2024-07-12 RX ORDER — FAMOTIDINE 20 MG/1
20 TABLET, FILM COATED ORAL 2 TIMES DAILY
Qty: 60 TABLET | Refills: 0 | Status: SHIPPED | OUTPATIENT
Start: 2024-07-12

## 2024-07-12 RX ORDER — LANOLIN ALCOHOL/MO/W.PET/CERES
400 CREAM (GRAM) TOPICAL DAILY
Qty: 30 TABLET | Refills: 3 | Status: SHIPPED | OUTPATIENT
Start: 2024-07-12

## 2024-07-12 RX ORDER — MELOXICAM 15 MG/1
15 TABLET ORAL DAILY
Qty: 30 TABLET | Refills: 5 | Status: SHIPPED | OUTPATIENT
Start: 2024-07-12

## 2024-07-23 PROBLEM — E04.1 THYROID NODULE: Status: ACTIVE | Noted: 2024-07-23

## 2024-07-23 NOTE — PROGRESS NOTES
Cecy Jacob 48 y.o. female MRN: [id number]    Encounter: [id number]      Assessment & Plan     Assessment:  This is a 48 y.o.-year-old female presenting for evaluation of thyroid nodule, which is subcentimeter, TR 3, not meeting biopsy or follow-up criteria.  Does also have nonspecific symptoms including weight gain, fatigue, arthralgias, feeling hot.  These are not related to the thyroid.    Plan:  1. Thyroid nodule  Assessment & Plan:  Ultra obtained due to calcification on x-ray cervical spine which was thought to be a calcified thyroid nodule  4/30/2024 thyroid ultrasound with subcentimeter left lower pole nodule TR 3, not meeting biopsy or follow-up criteria.  No calcification of this thyroid nodule.  It is not the structure seen on x-ray.  2. Abnormal x-ray of neck  Assessment & Plan:  3/26/24 cervical x-ray by chiropractor, noted with a paratracheal calcification concerning for thyroid nodule.  Thyroid ultrasound with a small subcentimeter noncalcified thyroid nodule which is not the structure seen on x-ray  Recommend PCP follow-up for consideration of further imaging of calcification structure  3. Fatigue, unspecified type  Assessment & Plan:  Not related to thyroid which is functioning normally  Likely multifactorial, patient with multiple potential etiologies including insomnia, poor sleep, sedating medication usage like Sinemet, multiple prior COVID infections  Orders:  -     Vitamin D 25 hydroxy; Future  -     Ferritin; Future  4. Vitamin D deficiency  -     Vitamin D 25 hydroxy; Future    FU PRN     CC: Thyroid nodule    History of Present Illness     HPI:  48 y.o. female with past medical history significant for migraine, early onset Parkinson's dx 2022, depression, restless leg syndrome, presenting in consult for thyroid nodule. Had xrays for chiropractor (scanned in 3/29/24) that showed a calcified paratracheal nodule concerning for thyroid nodule    Concern for weight gain   Todayu 174     12/23 156 on home scale, not on facility records   3/24 167    89/23 166   2/2022 159    9/2018 143  Walks daily, stretching , core   No changes last few months   , always stress     On Sinemet since 2022 Parkinson's diagnosis however was symptomatic for years prior  Emgality x 1 year for migraines   Norethindrone for partial hysterectomy x 2015, for ovarian cysts,  has tried to go off, no changes to how she feels  Covid 3x    Starting last year, achy, tired, joint aches,knees, hands, feet, started PRN meloxicam after seeing rheum. Feels hot all the time. Mostly evening sweats, occasionally drenched sheets.     4/30/2024 thyroid ultrasound with subcentimeter left lower pole nodule TR 3, not meeting biopsy or follow-up criteria.  Thyroid function normal going back to 2019    Family hx limited , patient adopted. No known syndromic issues, no known thyroid conditions    Review of Systems   Constitutional:  Positive for fatigue (worse later in day, ok im morning). Negative for activity change and appetite change.   Gastrointestinal:  Positive for constipation (chronic, prone to, manages on multiple meds). Negative for abdominal distention, abdominal pain and diarrhea.        + chronic cough improved w pepcid  + vague abdominal pain, random   Musculoskeletal:  Positive for arthralgias and myalgias.   Neurological:         + Parkinsonian movement symptoms   Psychiatric/Behavioral:  Positive for sleep disturbance (insomnia , trouble falling asleep but staying asleep is worse, ~5-6h).        Historical Information   Past Medical History:   Diagnosis Date    Allergic When I was a child.    Anxiety     Depression     Food allergy     scallops    Gall stone     lap ari today 5/8/2019    Headache(784.0) Years    Headache, tension-type     History of migraine     Memory loss noticed november 2019    Im having issues remembering names, words, etc.    Migraine     Motion sickness     PONV (postoperative  nausea and vomiting)     Wears glasses      Past Surgical History:   Procedure Laterality Date    BREAST CYST ASPIRATION Left     benign-age 13-14    CHOLECYSTECTOMY  June 2019    COLONOSCOPY      ENDOMETRIAL ABLATION      EYE SURGERY  lasik surgery in 2005    HYSTERECTOMY  2015    Partial    INJECTION ANESTHETIC AGENT TO CERVICAL PLEXUS      KNEE ARTHROSCOPY Left     OOPHORECTOMY Right 2016    NM LAPAROSCOPY SURG CHOLECYSTECTOMY N/A 05/08/2019    Procedure: LAP JULES w/ IOC;  Surgeon: Nimisha Kline MD;  Location: [facility];  Service: General    NM LAPAROSCOPY W/RMVL ADNEXAL STRUCTURES Right 09/16/2016    Procedure:  OOPHRECTOMY;  Surgeon: C William Riedel, DO;  Location: [facility];  Service: Gynecology    NM LAPS ABD PRTM&OMENTUM DX W/WO SPEC BR/WA SPX N/A 09/16/2016    Procedure: LAPAROSCOPY DIAGNOSTIC;  Surgeon: C William Riedel, DO;  Location: [facility];  Service: Gynecology    NM LAPS ABD PRTM&OMENTUM DX W/WO SPEC BR/WA SPX N/A 05/08/2019    Procedure: LAPAROSCOPY DIAGNOSTIC;  Surgeon: Nimisha Kline MD;  Location: [facility];  Service: General    REFRACTIVE SURGERY Bilateral     TONSILLECTOMY      WISDOM TOOTH EXTRACTION       Social History   Social History     Substance and Sexual Activity   Alcohol Use Not Currently    Alcohol/week: 1.0 standard drink of alcohol    Types: 1 Glasses of wine per week    Comment: When I do drink it is infrequent, and only special occasions     Social History     Substance and Sexual Activity   Drug Use No     Social History     Tobacco Use   Smoking Status Never   Smokeless Tobacco Never     Family History:   Family History   Adopted: Yes   Problem Relation Age of Onset    Alcohol abuse Father     Substance Abuse Father     Diabetes Sister     Migraines Sister     No Known Problems Sister     Migraines Sister     Cancer Maternal Grandmother     Alcohol abuse Maternal Grandfather     Heart disease Maternal Grandfather     Hypertension Maternal Grandfather     No Known  Problems Daughter     Ovarian cancer Maternal Aunt     Cancer Niece         ?throat cancer young    Breast cancer Neg Hx        Meds/Allergies   Current Outpatient Medications   Medication Sig Dispense Refill    carbidopa-levodopa (SINEMET CR)  mg per tablet Take 1 tablet by mouth daily at bedtime      carbidopa-levodopa (SINEMET)  mg per tablet Take 1 tablet by mouth every 6 (six) hours      cholecalciferol (VITAMIN D3) 1,000 units tablet Take 1 tablet (1,000 Units total) by mouth daily 30 tablet 3    Emgality 120 MG/ML SOAJ inject 120 milligrams subcutaneously every 30 DAYS 1 mL 11    EPINEPHrine (EPIPEN) 0.3 mg/0.3 mL SOAJ Inject 0.3 mL (0.3 mg total) into a muscle once for 1 dose 0.3 mL 1    famotidine (PEPCID) 20 mg tablet Take 1 tablet (20 mg total) by mouth 2 (two) times a day 60 tablet 0    fluticasone (FLONASE) 50 mcg/act nasal spray 1 spray into each nostril daily (Patient taking differently: 1 spray into each nostril if needed for allergies) 11.1 mL 0    magnesium Oxide (MAG-OX) 400 mg TABS Take 1 tablet (400 mg total) by mouth in the morning 30 tablet 3    meloxicam (Mobic) 15 mg tablet Take 1 tablet (15 mg total) by mouth daily 30 tablet 5    norethindrone (MICRONOR) 0.35 MG tablet Take 1 tablet (0.35 mg total) by mouth daily 90 tablet 1    rizatriptan (Maxalt-MLT) 10 mg disintegrating tablet Take 1 tablet (10 mg total) by mouth as needed for migraine Take at the onset of migraine; if symptoms continue or return, may take another dose at least 2 hours after first dose. Take no more than 2 doses in a day. 10 tablet 2     No current facility-administered medications for this visit.     Allergies   Allergen Reactions    Grass Pollen(K-O-R-T-Swt [place]) Shortness Of Breath     Fresh cut, breathing     Penicillins Hives    Codeine GI Intolerance    Shellfish-Derived Products - Food Allergy Other (See Comments)     Intolerance to scallops -reaction severe vomitting    Tetracyclines & Related GI  "Intolerance    Trazodone Other (See Comments)     Joint pain/myalgia    Ancef [Cefazolin] Tachycardia     Pt states she is unaware of the tachycardia but believes she had hives instead.        Objective   Vitals: Blood pressure 110/70, pulse 78, height 5' 5\" (1.651 m), weight 79 kg (174 lb 3.2 oz), SpO2 99%, not currently breastfeeding.    Physical Exam  Constitutional:       General: She is not in acute distress.     Appearance: Normal appearance. She is well-developed, well-groomed and overweight. She is not ill-appearing, toxic-appearing or diaphoretic.   HENT:      Head: Normocephalic and atraumatic.      Nose: Nose normal.   Eyes:      Extraocular Movements: Extraocular movements intact.      Conjunctiva/sclera: Conjunctivae normal.      Pupils: Pupils are equal, round, and reactive to light.   Neck:      Thyroid: No thyroid mass, thyromegaly or thyroid tenderness.   Pulmonary:      Effort: Pulmonary effort is normal. No respiratory distress.   Abdominal:      General: There is no distension.   Musculoskeletal:         General: No deformity.      Cervical back: Neck supple. No rigidity or tenderness.      Right lower leg: No edema.      Left lower leg: No edema.   Lymphadenopathy:      Cervical: No cervical adenopathy.   Skin:     General: Skin is warm and dry.   Neurological:      General: No focal deficit present.      Mental Status: She is alert. Mental status is at baseline.      Comments: Kicking legs frequently   Psychiatric:         Mood and Affect: Mood normal.         Behavior: Behavior normal.         Thought Content: Thought content normal.         The history was obtained from the review of the chart, patient and family.    Lab Results:   Lab Results   Component Value Date/Time    TSH 3RD [facility] 2.523 03/29/2024 07:06 AM       Imaging Studies:   Results for orders placed during the hospital encounter of 04/03/24    US thyroid    Impression  No nodule meets current ACR criteria for requiring " "biopsy or follow-up ultrasounds.        Reference: ACR Thyroid Imaging, Reporting and Data System (TI-RADS): White Paper of the ACR TI-RADS Committee. J AM Brittani Radiol 2017;14:587-595. Additional recommendations based on American Thyroid Association 2015 guidelines.      Workstation performed: [id number]      I have personally reviewed pertinent reports.      Portions of the record may have been created with voice recognition software. Occasional wrong word or \"sound a like\" substitutions may have occurred due to the inherent limitations of voice recognition software. Read the chart carefully and recognize, using context, where substitutions have occurred.    "

## 2024-07-24 ENCOUNTER — APPOINTMENT (OUTPATIENT)
Dept: LAB | Facility: CLINIC | Age: 49
End: 2024-07-24
Payer: OTHER GOVERNMENT

## 2024-07-24 ENCOUNTER — OFFICE VISIT (OUTPATIENT)
Dept: ENDOCRINOLOGY | Facility: CLINIC | Age: 49
End: 2024-07-24
Payer: OTHER GOVERNMENT

## 2024-07-24 VITALS
HEIGHT: 65 IN | HEART RATE: 78 BPM | DIASTOLIC BLOOD PRESSURE: 70 MMHG | BODY MASS INDEX: 29.02 KG/M2 | OXYGEN SATURATION: 99 % | WEIGHT: 174.2 LBS | SYSTOLIC BLOOD PRESSURE: 110 MMHG

## 2024-07-24 DIAGNOSIS — R53.83 FATIGUE, UNSPECIFIED TYPE: ICD-10-CM

## 2024-07-24 DIAGNOSIS — E55.9 VITAMIN D DEFICIENCY: ICD-10-CM

## 2024-07-24 DIAGNOSIS — R93.89 ABNORMAL X-RAY OF NECK: ICD-10-CM

## 2024-07-24 DIAGNOSIS — E04.1 THYROID NODULE: Primary | ICD-10-CM

## 2024-07-24 LAB
25(OH)D3 SERPL-MCNC: 65.2 NG/ML (ref 30–100)
FERRITIN SERPL-MCNC: 40 NG/ML (ref 11–307)

## 2024-07-24 PROCEDURE — 36415 COLL VENOUS BLD VENIPUNCTURE: CPT

## 2024-07-24 PROCEDURE — 82306 VITAMIN D 25 HYDROXY: CPT

## 2024-07-24 PROCEDURE — 99244 OFF/OP CNSLTJ NEW/EST MOD 40: CPT | Performed by: INTERNAL MEDICINE

## 2024-07-24 PROCEDURE — 82728 ASSAY OF FERRITIN: CPT

## 2024-07-24 NOTE — ASSESSMENT & PLAN NOTE
Ultra obtained due to calcification on x-ray cervical spine which was thought to be a calcified thyroid nodule  4/30/2024 thyroid ultrasound with subcentimeter left lower pole nodule TR 3, not meeting biopsy or follow-up criteria.  No calcification of this thyroid nodule.  It is not the structure seen on x-ray.

## 2024-07-24 NOTE — ASSESSMENT & PLAN NOTE
3/26/24 cervical x-ray by chiropractor, noted with a paratracheal calcification concerning for thyroid nodule.  Thyroid ultrasound with a small subcentimeter noncalcified thyroid nodule which is not the structure seen on x-ray  Recommend PCP follow-up for consideration of further imaging of calcification structure

## 2024-07-24 NOTE — PATIENT INSTRUCTIONS
--do follow up with PCP regarding the calcification on xray. It does not appear to be the thyroid nodule which has zero calcification. May require CT.   --no thyroid dysfunction on labs, nodule is small and benign , no need to repeat thyroid ultrasound  --non-urgent lbas- D and ferritin

## 2024-07-24 NOTE — ASSESSMENT & PLAN NOTE
Not related to thyroid which is functioning normally  Likely multifactorial, patient with multiple potential etiologies including insomnia, poor sleep, sedating medication usage like Sinemet, multiple prior COVID infections

## 2024-08-04 DIAGNOSIS — R05.3 CHRONIC COUGH: ICD-10-CM

## 2024-08-04 RX ORDER — FAMOTIDINE 20 MG/1
20 TABLET, FILM COATED ORAL 2 TIMES DAILY
Qty: 60 TABLET | Refills: 5 | Status: SHIPPED | OUTPATIENT
Start: 2024-08-04

## 2024-08-27 ENCOUNTER — TELEPHONE (OUTPATIENT)
Dept: GYNECOLOGY | Facility: CLINIC | Age: 49
End: 2024-08-27

## 2024-08-27 NOTE — TELEPHONE ENCOUNTER
Called patient and left message asking patient to call back and reschedule apt, DR.Riedel will not be in office the week of  9/30-10/03/24. Please reschedule patient thank you.

## 2024-09-09 ENCOUNTER — OFFICE VISIT (OUTPATIENT)
Dept: NEUROLOGY | Facility: CLINIC | Age: 49
End: 2024-09-09
Payer: OTHER GOVERNMENT

## 2024-09-09 VITALS
HEART RATE: 82 BPM | WEIGHT: 169.8 LBS | BODY MASS INDEX: 28.29 KG/M2 | HEIGHT: 65 IN | SYSTOLIC BLOOD PRESSURE: 110 MMHG | TEMPERATURE: 98.1 F | DIASTOLIC BLOOD PRESSURE: 60 MMHG

## 2024-09-09 DIAGNOSIS — G43.009 MIGRAINE WITHOUT AURA AND WITHOUT STATUS MIGRAINOSUS, NOT INTRACTABLE: ICD-10-CM

## 2024-09-09 PROCEDURE — 99214 OFFICE O/P EST MOD 30 MIN: CPT | Performed by: PHYSICIAN ASSISTANT

## 2024-09-09 RX ORDER — RIZATRIPTAN BENZOATE 10 MG/1
10 TABLET, ORALLY DISINTEGRATING ORAL AS NEEDED
Qty: 30 TABLET | Refills: 2 | Status: SHIPPED | OUTPATIENT
Start: 2024-09-09

## 2024-09-09 RX ORDER — GALCANEZUMAB 120 MG/ML
1 INJECTION, SOLUTION SUBCUTANEOUS
Qty: 3 ML | Refills: 4 | Status: SHIPPED | OUTPATIENT
Start: 2024-09-09

## 2024-09-09 NOTE — PATIENT INSTRUCTIONS
1. Migraine without aura and without status migrainosus, not intractable  Assessment & Plan:  Preventative:  Emgality 1 injection every 28 days  Continue Magnesium and Vitamin D    Abortive:  At onset of migraines, take rizatriptan 10 mg.  May repeat in 2 hours if needed.  Limit of 3 a week or 9 month  Ok to use ibuprofen 400-600 mg but less than 3 doses a week  Orders:  -     Galcanezumab-gnlm (Emgality) 120 MG/ML SOAJ; Inject 1 mL under the skin every 28 days  -     rizatriptan (Maxalt-MLT) 10 mg disintegrating tablet; Take 1 tablet (10 mg total) by mouth as needed for migraine Take at the onset of migraine; if symptoms continue or return, may take another dose at least 2 hours after first dose. Take no more than 2 doses in a day.

## 2024-09-09 NOTE — ASSESSMENT & PLAN NOTE
Preventative:  Emgality 1 injection every 28 days  Continue Magnesium and Vitamin D    Abortive:  At onset of migraines, take rizatriptan 10 mg.  May repeat in 2 hours if needed.  Limit of 3 a week or 9 month  Ok to use ibuprofen 400-600 mg but less than 3 doses a week
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
## 2024-10-22 ENCOUNTER — ANNUAL EXAM (OUTPATIENT)
Dept: GYNECOLOGY | Facility: CLINIC | Age: 49
End: 2024-10-22
Payer: OTHER GOVERNMENT

## 2024-10-22 VITALS
WEIGHT: 169.2 LBS | SYSTOLIC BLOOD PRESSURE: 112 MMHG | BODY MASS INDEX: 28.19 KG/M2 | HEIGHT: 65 IN | DIASTOLIC BLOOD PRESSURE: 74 MMHG

## 2024-10-22 DIAGNOSIS — Z01.419 WOMEN'S ANNUAL ROUTINE GYNECOLOGICAL EXAMINATION: Primary | ICD-10-CM

## 2024-10-22 DIAGNOSIS — Z12.31 VISIT FOR SCREENING MAMMOGRAM: ICD-10-CM

## 2024-10-22 DIAGNOSIS — Z86.69 HISTORY OF PARKINSON'S DISEASE: ICD-10-CM

## 2024-10-22 DIAGNOSIS — E55.9 VITAMIN D DEFICIENCY: ICD-10-CM

## 2024-10-22 DIAGNOSIS — Z90.711 STATUS POST LAPAROSCOPIC SUPRACERVICAL HYSTERECTOMY: ICD-10-CM

## 2024-10-22 PROCEDURE — 99396 PREV VISIT EST AGE 40-64: CPT | Performed by: OBSTETRICS & GYNECOLOGY

## 2024-10-22 NOTE — PROGRESS NOTES
"Assessment   Annual well woman exam  Status post laparoscopic supracervical hysterectomy  History of Parkinson's disease  History of vitamin patient seen  Menopausal syndrome      Plan   Normal Pap smear in , next Pap due in   Patient moving out of the area  Hot flashes controlled with low-dose norethindrone  Screening mammogram completed results pending   All questions answered.  Breast self exam technique reviewed and patient encouraged to perform self-exam monthly.  Discussed healthy lifestyle modifications.     Subjective here for annual exam     Cecy Jacob is a 48 y.o. female who presents for annual exam.  No longer has menstrual cycle since her hysterectomy many years ago.  She is still occasionally sexually active.  Denies pain or bleeding symptoms denies vaginal discharge. The patient reports that there is not domestic violence in her life.     Current contraception: none  History of abnormal Pap smear: no  Family history of uterine or ovarian cancer: no  Regular self breast exam: yes  History of abnormal mammogram: no  Family history of breast cancer: no  History of abnormal lipids: no  Menstrual History:  OB History          2    Para   2    Term   2            AB        Living             SAB        IAB        Ectopic        Multiple        Live Births                    Menarche age: 10  No LMP recorded (lmp unknown). Patient has had a hysterectomy.     Review of Systems  Pertinent items are noted in HPI.      Objective no acute distress   /74 (BP Location: Left arm, Patient Position: Sitting, Cuff Size: Standard)   Ht 5' 5\" (1.651 m)   Wt 76.7 kg (169 lb 3.2 oz)   LMP  (LMP Unknown)   BMI 28.16 kg/m²     General:   alert and oriented, in no acute distress, alert, appears stated age, and cooperative   Heart: regular rate and rhythm, S1, S2 normal, no murmur, click, rub or gallop   Lungs: clear to auscultation bilaterally   Abdomen: soft, non-tender, without masses " or organomegaly   Vulva: normal, Bartholin's, Urethra, Effingham's normal, female escutcheon   Vagina: normal mucosa, normal discharge, no palpable nodules   Cervix: anteverted, multiparous appearance, no cervical motion tenderness, and no lesions   Uterus: surgically absent   Adnexa: normal adnexa and no mass, fullness, tenderness   Bilateral breast exam in the sitting and supine position with chaperone present, no visible or palpable breast lesions identified.  No breast masses noted.    No supraclavicular or axillary lymphadenopathy noted.  No nipple discharge.   Reviewed self-breast exam techniques.   Rectal exam,  deferred

## 2024-11-09 DIAGNOSIS — M62.9 MUSCLE DISORDER: ICD-10-CM

## 2024-11-11 DIAGNOSIS — G43.009 MIGRAINE WITHOUT AURA AND WITHOUT STATUS MIGRAINOSUS, NOT INTRACTABLE: Primary | ICD-10-CM

## 2024-11-11 RX ORDER — LANOLIN ALCOHOL/MO/W.PET/CERES
400 CREAM (GRAM) TOPICAL EVERY MORNING
Qty: 30 TABLET | Refills: 0 | Status: SHIPPED | OUTPATIENT
Start: 2024-11-11

## 2025-01-06 DIAGNOSIS — N95.1 MENOPAUSAL SYNDROME (HOT FLASHES): ICD-10-CM

## 2025-01-06 NOTE — TELEPHONE ENCOUNTER
Patient called back in November for this refill and again in December. Ask for a phone call and never received a phone call. Been out of medication and would like someone to call her if this will not be filled.       Reason for call:   [x] Refill   [] Prior Auth  [] Other:     Office:   [] PCP/Provider -   [x] Specialty/Provider -C William Riedel/[facility] Zulma Degroot      Medication: Micronor    Dose/Frequency: 0.35 mg     Quantity: #90    Pharmacy: [facility]     Does the patient have enough for 3 days?   [] Yes   [x] No - Send as HP to POD

## 2025-01-15 RX ORDER — ACETAMINOPHEN AND CODEINE PHOSPHATE 120; 12 MG/5ML; MG/5ML
1 SOLUTION ORAL DAILY
Qty: 90 TABLET | Refills: 1 | OUTPATIENT
Start: 2025-01-15 | End: 2025-07-14

## 2025-06-18 DIAGNOSIS — M17.0 PRIMARY OSTEOARTHRITIS OF BOTH KNEES: ICD-10-CM

## 2025-06-18 RX ORDER — MELOXICAM 15 MG/1
15 TABLET ORAL DAILY
Qty: 30 TABLET | Refills: 0 | Status: SHIPPED | OUTPATIENT
Start: 2025-06-18

## 2025-06-18 NOTE — TELEPHONE ENCOUNTER
Per protocol,Patient needs updated Hemoglobin. Please place orders. A courtesy refill was provided.

## 2025-06-29 DIAGNOSIS — R05.3 CHRONIC COUGH: ICD-10-CM

## 2025-07-01 RX ORDER — FAMOTIDINE 20 MG/1
20 TABLET, FILM COATED ORAL 2 TIMES DAILY
Qty: 60 TABLET | Refills: 2 | Status: SHIPPED | OUTPATIENT
Start: 2025-07-01

## 2025-07-15 DIAGNOSIS — R05.3 CHRONIC COUGH: ICD-10-CM

## 2025-07-16 RX ORDER — FAMOTIDINE 20 MG/1
20 TABLET, FILM COATED ORAL 2 TIMES DAILY
Qty: 60 TABLET | Refills: 2 | Status: SHIPPED | OUTPATIENT
Start: 2025-07-16

## 2025-07-25 DIAGNOSIS — M17.0 PRIMARY OSTEOARTHRITIS OF BOTH KNEES: ICD-10-CM

## 2025-07-28 RX ORDER — MELOXICAM 15 MG/1
15 TABLET ORAL DAILY
Qty: 30 TABLET | Refills: 1 | Status: SHIPPED | OUTPATIENT
Start: 2025-07-28

## (undated) DEVICE — CHLORAPREP HI-LITE 26ML ORANGE

## (undated) DEVICE — SPONGE 4 X 4 XRAY 16 PLY STRL LF RFD

## (undated) DEVICE — GLOVE SRG BIOGEL 7

## (undated) DEVICE — TELFA NON-ADHERENT ABSORBENT DRESSING: Brand: TELFA

## (undated) DEVICE — INTENDED FOR TISSUE SEPARATION, AND OTHER PROCEDURES THAT REQUIRE A SHARP SURGICAL BLADE TO PUNCTURE OR CUT.: Brand: BARD-PARKER SAFETY BLADES SIZE 10, STERILE

## (undated) DEVICE — HARMONIC ACE 5MM DIAMETER SHEARS 36CM SHAFT LENGTH + ADAPTIVE TISSUE TECHNOLOGY FOR USE WITH GENERATOR G11: Brand: HARMONIC ACE

## (undated) DEVICE — TROCAR: Brand: KII FIOS FIRST ENTRY

## (undated) DEVICE — SCD SEQUENTIAL COMPRESSION COMFORT SLEEVE MEDIUM KNEE LENGTH: Brand: KENDALL SCD

## (undated) DEVICE — IV EXTENSION TUBING 33 IN

## (undated) DEVICE — BLUE HEAT SCOPE WARMER

## (undated) DEVICE — INTENDED FOR TISSUE SEPARATION, AND OTHER PROCEDURES THAT REQUIRE A SHARP SURGICAL BLADE TO PUNCTURE OR CUT.: Brand: BARD-PARKER SAFETY BLADES SIZE 15, STERILE

## (undated) DEVICE — 5 MM CURVED DISSECTORS WITH MONOPOLAR CAUTERY: Brand: ENDOPATH

## (undated) DEVICE — 3M™ STERI-STRIP™ COMPOUND BENZOIN TINCTURE 40 BAGS/CARTON 4 CARTONS/CASE C1544: Brand: 3M™ STERI-STRIP™

## (undated) DEVICE — DRAPE C-ARM X-RAY

## (undated) DEVICE — SUT MONOCRYL 4-0 PS-2 27 IN Y426H

## (undated) DEVICE — IV CATH 14 G SAFETY

## (undated) DEVICE — SMOKE EVACUATION TUBING WITH 7/8 IN TO 1/4 IN REDUCER: Brand: BUFFALO FILTER

## (undated) DEVICE — PENCIL ELECTROSURG E-Z CLEAN -0035H

## (undated) DEVICE — NEEDLE HYPO 22G X 1-1/2 IN

## (undated) DEVICE — SPONGE LAP 18 X 18 IN STRL RFD

## (undated) DEVICE — 2963 MEDIPORE SOFT CLOTH TAPE 3 IN X 10 YD 12 RLS/CS: Brand: 3M™ MEDIPORE™

## (undated) DEVICE — GLOVE INDICATOR PI UNDERGLOVE SZ 6.5 BLUE

## (undated) DEVICE — LIGHT GLOVE GREEN

## (undated) DEVICE — PLASTIC ADHESIVE BANDAGE: Brand: CURITY

## (undated) DEVICE — TROCAR: Brand: KII® SLEEVE

## (undated) DEVICE — GLOVE SRG BIOGEL 6.5

## (undated) DEVICE — IRRIG ENDO FLO TUBING

## (undated) DEVICE — TAUT CATH INTRODUCER 4.5 FR

## (undated) DEVICE — 3M™ STERI-STRIP™ REINFORCED ADHESIVE SKIN CLOSURES, R1547, 1/2 IN X 4 IN (12 MM X 100 MM), 6 STRIPS/ENVELOPE: Brand: 3M™ STERI-STRIP™

## (undated) DEVICE — ADHESIVE SKN CLSR HISTOACRYL FLEX 0.5ML LF

## (undated) DEVICE — ALLENTOWN LAP CHOLE APP PACK: Brand: CARDINAL HEALTH

## (undated) DEVICE — LIGACLIP 10-M/L, 10MM ENDOSCOPIC ROTATING MULTIPLE CLIP APPLIERS: Brand: LIGACLIP

## (undated) DEVICE — ASTOUND STANDARD SURGICAL GOWN, XL: Brand: CONVERTORS

## (undated) DEVICE — PLUMEPEN PRO 10FT

## (undated) DEVICE — DRAPE EQUIPMENT RF WAND

## (undated) DEVICE — GLOVE PI ULTRA TOUCH SZ.7.0

## (undated) DEVICE — GLOVE INDICATOR PI UNDERGLOVE SZ 7.5 BLUE

## (undated) DEVICE — GLOVE INDICATOR PI UNDERGLOVE SZ 7 BLUE

## (undated) DEVICE — LAPAROSCOPIC SMOKE EVAC TUBING

## (undated) DEVICE — ENDOPOUCH RETRIEVER SPECIMEN RETRIEVAL BAGS: Brand: ENDOPOUCH RETRIEVER

## (undated) DEVICE — ENDOPATH 5MM CURVED SCISSORS WITH MONOPOLAR CAUTERY: Brand: ENDOPATH

## (undated) DEVICE — 2000CC GUARDIAN II: Brand: GUARDIAN

## (undated) DEVICE — [HIGH FLOW INSUFFLATOR,  DO NOT USE IF PACKAGE IS DAMAGED,  KEEP DRY,  KEEP AWAY FROM SUNLIGHT,  PROTECT FROM HEAT AND RADIOACTIVE SOURCES.]: Brand: PNEUMOSURE

## (undated) DEVICE — FILTER W/WATER TRAP ULPA INPUT

## (undated) DEVICE — SYRINGE 20ML LL

## (undated) DEVICE — SYRINGE 30ML LL